# Patient Record
Sex: MALE | Race: WHITE | NOT HISPANIC OR LATINO | Employment: UNEMPLOYED | URBAN - METROPOLITAN AREA
[De-identification: names, ages, dates, MRNs, and addresses within clinical notes are randomized per-mention and may not be internally consistent; named-entity substitution may affect disease eponyms.]

---

## 2017-07-18 ENCOUNTER — HOSPITAL ENCOUNTER (EMERGENCY)
Facility: HOSPITAL | Age: 27
Discharge: HOME | End: 2017-07-18
Attending: EMERGENCY MEDICINE
Payer: COMMERCIAL

## 2017-07-18 VITALS
OXYGEN SATURATION: 95 % | SYSTOLIC BLOOD PRESSURE: 133 MMHG | RESPIRATION RATE: 18 BRPM | WEIGHT: 200 LBS | DIASTOLIC BLOOD PRESSURE: 73 MMHG | HEART RATE: 71 BPM
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
## 2017-07-18 DIAGNOSIS — T78.40XA ALLERGIC REACTION, INITIAL ENCOUNTER: Primary | ICD-10-CM

## 2017-07-18 PROCEDURE — 99283 EMERGENCY DEPT VISIT LOW MDM: CPT

## 2017-07-18 RX ORDER — DIPHENHYDRAMINE HCL 25 MG
25 CAPSULE ORAL EVERY 6 HOURS PRN
Qty: 20 CAPSULE | Refills: 0 | Status: SHIPPED | OUTPATIENT
Start: 2017-07-18 | End: 2018-03-29 | Stop reason: ALTCHOICE

## 2017-07-18 RX ORDER — DIPHENHYDRAMINE HCL 25 MG
25 TABLET ORAL EVERY 6 HOURS PRN
Qty: 10 TABLET | Refills: 0 | OUTPATIENT
Start: 2017-07-18

## 2017-07-18 RX ORDER — DIPHENHYDRAMINE HCL 25 MG
25 TABLET ORAL ONCE
Status: COMPLETED | OUTPATIENT
Start: 2017-07-18 | End: 2017-07-18

## 2017-07-18 RX ORDER — PREDNISONE 20 MG/1
40 TABLET ORAL ONCE
Status: COMPLETED | OUTPATIENT
Start: 2017-07-18 | End: 2017-07-18

## 2017-07-18 RX ADMIN — DIPHENHYDRAMINE HCL 25 MG: 25 TABLET ORAL at 19:43

## 2017-07-18 RX ADMIN — PREDNISONE 40 MG: 20 TABLET ORAL at 19:44

## 2018-03-28 NOTE — PROGRESS NOTES
Assessment/Plan:  Detailed and specific instructions given to pt-  called pharmacist and asked him to give pt instructions as well - to prevent pt from confusing kenalog w/ permetherin  Diagnoses and all orders for this visit:    Snoring  -     Ambulatory referral to Sleep Medicine; Future    Scabies infestation  -     permethrin (ELIMITE) 5 % cream; Apply topically once for 1 dose Apply from neck to toes - leave on for 14 hrs then wash off and repeat in 7 days; have close contacts use as well  Bedbug bite, initial encounter  -     triamcinolone (KENALOG) 0 025 % cream; Apply topically 2 (two) times a day          Subjective: New patient here to establish care     Patient complain of snoring   Patient also complain of a body rash unsure of how long he had it   Patient thinks he had the flu vac already but unsure of when           Patient ID: Bryant Hughes is a 24 y o  male  New pt  Here to establish care  No meds  PMH - childhood asthma and depression  Pt is limited in reading and writing   Lives w his girlfriend - who is limited intellect  Pt complaining of snoring and told he stops breathing at night by girlfriend  Also complaining of rash on arms/ legs - not itching x  months but new rash on left hand this week and neck  Pt lives w/ adopted parents - but biological mom's home has bed bugs - and he slept there one week ago  The following portions of the patient's history were reviewed and updated as appropriate: allergies, past surgical history and problem list     Review of Systems   Constitutional: Negative for activity change, appetite change, chills, fatigue and fever  HENT: Negative  Negative for congestion  Eyes: Negative  Respiratory: Negative  Snoring   Cardiovascular: Negative  Negative for chest pain  Gastrointestinal: Negative  Negative for abdominal pain, blood in stool and nausea  Endocrine: Negative  Negative for cold intolerance  Genitourinary: Negative  Musculoskeletal: Negative  Skin: Positive for rash  See hpi   Allergic/Immunologic: Negative  Neurological: Negative  Hematological: Negative  Psychiatric/Behavioral: Negative  All other systems reviewed and are negative  Objective:    Vitals:    03/29/18 1608   BP: 130/90   Pulse: 105   Resp: 18   Temp: 98 6 °F (37 °C)   SpO2: 97%   Weight: 114 kg (251 lb 12 8 oz)   Height: 5' 8 11" (1 73 m)     There were no vitals taken for this visit  Physical Exam   Constitutional: He is oriented to person, place, and time  He appears well-developed and well-nourished  No distress  HENT:   Head: Normocephalic  Right Ear: Tympanic membrane and external ear normal  No decreased hearing is noted  Left Ear: Tympanic membrane and external ear normal  No decreased hearing is noted  Mouth/Throat: Oropharynx is clear and moist    Eyes: Conjunctivae are normal  Pupils are equal, round, and reactive to light  Neck: Normal range of motion  Neck supple  No thyromegaly present  Cardiovascular: Normal rate, regular rhythm, normal heart sounds and intact distal pulses  No murmur heard  Pulmonary/Chest: Effort normal and breath sounds normal  No respiratory distress  Abdominal: Soft  Bowel sounds are normal  He exhibits no distension  There is no tenderness  There is no rebound  Musculoskeletal: Normal range of motion  Lymphadenopathy:     He has no cervical adenopathy  Neurological: He is alert and oriented to person, place, and time  He has normal reflexes  No cranial nerve deficit  Coordination normal    Skin: Skin is warm and dry  Rash noted  Rash is maculopapular (scattered bilat  forearms  and scarcely on lower legs; linear red pruritic area of papules left hand and in between left fingers/ on neck)  Rash is not vesicular  There is erythema  Psychiatric: He has a normal mood and affect   His behavior is normal  Judgment and thought content normal

## 2018-03-29 ENCOUNTER — OFFICE VISIT (OUTPATIENT)
Dept: FAMILY MEDICINE CLINIC | Facility: CLINIC | Age: 28
End: 2018-03-29
Payer: COMMERCIAL

## 2018-03-29 VITALS
OXYGEN SATURATION: 97 % | HEART RATE: 105 BPM | RESPIRATION RATE: 18 BRPM | BODY MASS INDEX: 38.16 KG/M2 | SYSTOLIC BLOOD PRESSURE: 130 MMHG | DIASTOLIC BLOOD PRESSURE: 90 MMHG | HEIGHT: 68 IN | WEIGHT: 251.8 LBS | TEMPERATURE: 98.6 F
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
## 2018-03-29 DIAGNOSIS — B86 SCABIES INFESTATION: ICD-10-CM

## 2018-03-29 DIAGNOSIS — R06.83 SNORING: Primary | ICD-10-CM

## 2018-03-29 PROBLEM — F81.9 INTELLECTUAL DELAY: Status: ACTIVE | Noted: 2018-03-29

## 2018-03-29 PROCEDURE — 99203 OFFICE O/P NEW LOW 30 MIN: CPT | Performed by: NURSE PRACTITIONER

## 2018-03-29 PROCEDURE — 3008F BODY MASS INDEX DOCD: CPT | Performed by: NURSE PRACTITIONER

## 2018-03-29 RX ORDER — TRIAMCINOLONE ACETONIDE 0.25 MG/G
CREAM TOPICAL 2 TIMES DAILY
Qty: 30 G | Refills: 0 | Status: SHIPPED | OUTPATIENT
Start: 2018-03-29 | End: 2018-08-21

## 2018-03-29 RX ORDER — PERMETHRIN 50 MG/G
CREAM TOPICAL ONCE
Qty: 60 G | Refills: 2 | Status: SHIPPED | OUTPATIENT
Start: 2018-03-29 | End: 2018-03-29

## 2018-03-29 NOTE — PATIENT INSTRUCTIONS
Scabies   AMBULATORY CARE:   Scabies  is a skin condition that is caused by scabies mites  Scabies mites are tiny bugs that burrow, lay eggs, and live underneath the skin  Scabies is spread through close contact with a person who has scabies  This includes having sex, sleeping in the same bed, or sharing towels or clothing  Scabies can spread quickly and must be treated as soon as it is found  Common signs and symptoms: You may not know you have scabies until a few weeks after mites are under your skin  Scabies mites are too small to be seen on your body  You may have any of the following:  · Red, raised bumps on your skin    · Bad itching that is usually worse at night    · Burrow marks (short wavy lines) between your fingers, or on your ankles, elbows, groin, armpits, or breasts  Seek care immediately if:   · You develop a fever and red, swollen, painful areas on your skin  Contact your healthcare provider if:   · The bites become crusty or filled with pus  · You have worsening itching after scabies treatment  · You have new bite or burrow marks after treatment  · You have questions or concerns about your condition or care  Treatment:  Your healthcare provider may want to treat scabies even if signs of mites are not found  Several kinds of medicine may be used to treat scabies  The medicine may be a cream or pill  Always follow the directions for the scabies medicine you are told to use  · Your healthcare provider may tell you to rub a thin layer of the medicine onto your entire body from the neck down  · Leave the cream on for the amount of time that is required for the medicine you are using  This may be between 8 to 14 hours  · Take a bath or shower to wash all medicine from your skin after the scabies treatment is done  · Put on clean clothes after you have rinsed the medicine off  You may need another scabies treatment in about 7 to 10 days if you continue to have symptoms    Help relieve itching: Your skin may continue to itch for 2 or 3 weeks, even after the scabies mites are gone  Over-the-counter antihistamines or cortisone cream may help relieve itching  Trim your fingernails so you do not spread any mites that are still alive after treatment  Do not scratch your skin  Scratches may cause a skin infection  A cool bath may also help relieve the itching  Prevent the spread of scabies:   · Have all family members use scabies medicine  Tell all sex partners and anyone who has shared your clothing or bed for the past month about the scabies  Tell them to ask their healthcare provider for scabies medicine even if they have no itching, rash, or burrow marks  · Wash all items that you have used since 3 days before you learned about your scabies  Use hot water to wash all clothing, bedding, and towels  Dry them for at least 20 minutes on the hot cycle of a dryer  Take items to be dry cleaned that cannot be washed in a washing machine  Place any clothing or bedding that cannot be washed or dry cleaned in a closed plastic bag for 1 week  · Do not have close body contact with anyone until the scabies mites are gone  Talk to your healthcare provider about how long you need to wait  Also ask about public places to avoid, such as the gym  Return to school or work: You may return to school or work 24 hours after using scabies medicine  Follow up with your healthcare provider as directed:  Write down your questions so you remember to ask them during your visits  © 2017 [address] Information is for End User's use only and may not be sold, redistributed or otherwise used for commercial purposes  All illustrations and images included in CareNotes® are the copyrighted property of [employer]  or [employer]  The above information is an  only  It is not intended as medical advice for individual conditions or treatments   Talk to your doctor, nurse or pharmacist before following any medical regimen to see if it is safe and effective for you

## 2018-08-21 ENCOUNTER — APPOINTMENT (EMERGENCY)
Dept: RADIOLOGY | Facility: HOSPITAL | Age: 28
End: 2018-08-21
Payer: COMMERCIAL

## 2018-08-21 ENCOUNTER — HOSPITAL ENCOUNTER (EMERGENCY)
Facility: HOSPITAL | Age: 28
Discharge: HOME | End: 2018-08-21
Attending: EMERGENCY MEDICINE
Payer: COMMERCIAL

## 2018-08-21 VITALS
OXYGEN SATURATION: 99 % | HEART RATE: 80 BPM | TEMPERATURE: 97.7 F | DIASTOLIC BLOOD PRESSURE: 65 MMHG | HEIGHT: 68 IN | SYSTOLIC BLOOD PRESSURE: 142 MMHG | BODY MASS INDEX: 40.01 KG/M2 | RESPIRATION RATE: 17 BRPM | WEIGHT: 264 LBS

## 2018-08-21 DIAGNOSIS — R10.9 ABDOMINAL PAIN: Primary | ICD-10-CM

## 2018-08-21 LAB
ALBUMIN SERPL BCP-MCNC: 3.7 G/DL (ref 3.5–5)
ALP SERPL-CCNC: 71 U/L (ref 46–116)
ALT SERPL W P-5'-P-CCNC: 117 U/L (ref 12–78)
ANION GAP SERPL CALCULATED.3IONS-SCNC: 8 MMOL/L (ref 4–13)
AST SERPL W P-5'-P-CCNC: 45 U/L (ref 5–45)
BACTERIA UR QL AUTO: ABNORMAL /HPF
BASOPHILS # BLD AUTO: 0.08 THOUSANDS/ΜL (ref 0–0.1)
BASOPHILS NFR BLD AUTO: 1 % (ref 0–1)
BILIRUB SERPL-MCNC: 0.34 MG/DL (ref 0.2–1)
BILIRUB UR QL STRIP: NEGATIVE
BUN SERPL-MCNC: 12 MG/DL (ref 5–25)
CALCIUM SERPL-MCNC: 8.7 MG/DL (ref 8.3–10.1)
CHLORIDE SERPL-SCNC: 105 MMOL/L (ref 100–108)
CLARITY UR: CLEAR
CO2 SERPL-SCNC: 27 MMOL/L (ref 21–32)
COLOR UR: YELLOW
COLOR, POC: NORMAL
CREAT SERPL-MCNC: 0.83 MG/DL (ref 0.6–1.3)
EOSINOPHIL # BLD AUTO: 0.37 THOUSAND/ΜL (ref 0–0.61)
EOSINOPHIL NFR BLD AUTO: 3 % (ref 0–6)
ERYTHROCYTE [DISTWIDTH] IN BLOOD BY AUTOMATED COUNT: 13.1 % (ref 11.6–15.1)
GFR SERPL CREATININE-BSD FRML MDRD: 122 ML/MIN/1.73SQ M
GLUCOSE SERPL-MCNC: 104 MG/DL (ref 65–140)
GLUCOSE UR STRIP-MCNC: NEGATIVE MG/DL
HCT VFR BLD AUTO: 46.4 % (ref 36.5–49.3)
HGB BLD-MCNC: 15.3 G/DL (ref 12–17)
HGB UR QL STRIP.AUTO: ABNORMAL
HYALINE CASTS #/AREA URNS LPF: ABNORMAL /LPF
IMM GRANULOCYTES # BLD AUTO: 0.05 THOUSAND/UL (ref 0–0.2)
IMM GRANULOCYTES NFR BLD AUTO: 0 % (ref 0–2)
KETONES UR STRIP-MCNC: NEGATIVE MG/DL
LEUKOCYTE ESTERASE UR QL STRIP: ABNORMAL
LIPASE SERPL-CCNC: 111 U/L (ref 73–393)
LYMPHOCYTES # BLD AUTO: 3.17 THOUSANDS/ΜL (ref 0.6–4.47)
LYMPHOCYTES NFR BLD AUTO: 29 % (ref 14–44)
MCH RBC QN AUTO: 28.8 PG (ref 26.8–34.3)
MCHC RBC AUTO-ENTMCNC: 33 G/DL (ref 31.4–37.4)
MCV RBC AUTO: 87 FL (ref 82–98)
MONOCYTES # BLD AUTO: 0.74 THOUSAND/ΜL (ref 0.17–1.22)
MONOCYTES NFR BLD AUTO: 7 % (ref 4–12)
NEUTROPHILS # BLD AUTO: 6.72 THOUSANDS/ΜL (ref 1.85–7.62)
NEUTS SEG NFR BLD AUTO: 60 % (ref 43–75)
NITRITE UR QL STRIP: NEGATIVE
NON-SQ EPI CELLS URNS QL MICRO: ABNORMAL /HPF
NRBC BLD AUTO-RTO: 0 /100 WBCS
PH UR STRIP.AUTO: 7 [PH] (ref 4.5–8)
PLATELET # BLD AUTO: 249 THOUSANDS/UL (ref 149–390)
PMV BLD AUTO: 10.1 FL (ref 8.9–12.7)
POTASSIUM SERPL-SCNC: 3.6 MMOL/L (ref 3.5–5.3)
PROT SERPL-MCNC: 7.1 G/DL (ref 6.4–8.2)
PROT UR STRIP-MCNC: NEGATIVE MG/DL
RBC # BLD AUTO: 5.31 MILLION/UL (ref 3.88–5.62)
RBC #/AREA URNS AUTO: ABNORMAL /HPF
SODIUM SERPL-SCNC: 140 MMOL/L (ref 136–145)
SP GR UR STRIP.AUTO: >=1.03 (ref 1–1.03)
UROBILINOGEN UR QL STRIP.AUTO: 0.2 E.U./DL
WBC # BLD AUTO: 11.13 THOUSAND/UL (ref 4.31–10.16)
WBC #/AREA URNS AUTO: ABNORMAL /HPF

## 2018-08-21 PROCEDURE — 96372 THER/PROPH/DIAG INJ SC/IM: CPT

## 2018-08-21 PROCEDURE — 36415 COLL VENOUS BLD VENIPUNCTURE: CPT

## 2018-08-21 PROCEDURE — 83690 ASSAY OF LIPASE: CPT | Performed by: EMERGENCY MEDICINE

## 2018-08-21 PROCEDURE — 81001 URINALYSIS AUTO W/SCOPE: CPT

## 2018-08-21 PROCEDURE — 85025 COMPLETE CBC W/AUTO DIFF WBC: CPT | Performed by: EMERGENCY MEDICINE

## 2018-08-21 PROCEDURE — 74177 CT ABD & PELVIS W/CONTRAST: CPT

## 2018-08-21 PROCEDURE — 80053 COMPREHEN METABOLIC PANEL: CPT | Performed by: EMERGENCY MEDICINE

## 2018-08-21 PROCEDURE — 99284 EMERGENCY DEPT VISIT MOD MDM: CPT

## 2018-08-21 PROCEDURE — 87086 URINE CULTURE/COLONY COUNT: CPT

## 2018-08-21 RX ORDER — KETOROLAC TROMETHAMINE 30 MG/ML
15 INJECTION, SOLUTION INTRAMUSCULAR; INTRAVENOUS ONCE
Status: COMPLETED | OUTPATIENT
Start: 2018-08-21 | End: 2018-08-21

## 2018-08-21 RX ADMIN — IOHEXOL 100 ML: 350 INJECTION, SOLUTION INTRAVENOUS at 13:51

## 2018-08-21 RX ADMIN — KETOROLAC TROMETHAMINE 15 MG: 30 INJECTION, SOLUTION INTRAMUSCULAR at 14:52

## 2018-08-21 NOTE — DISCHARGE INSTRUCTIONS

## 2018-08-21 NOTE — ED PROVIDER NOTES
History  Chief Complaint   Patient presents with    Abdominal Pain     Pt c/o R lower quadrant abd pain since last monday  Pt denies n/v/d at this time  24year old male with history of asthma, current smoker, presents to ED for RLQ abdominal pain  Reports that pain started 8 days ago and has been gradually worsening  Pain is sharp in quality, 7-9/10 severity, constant, worse with ambulation and movement, non-radiating  He denies changes in appetite, nausea, vomiting, diarrhea, fever, chills, constipation, urinary symptoms, flank pain, back pain, testicular pain, chest pain, shortness of breath, leg pain/swelling, or any additional complaints  No history of abdominal surgeries  None       Past Medical History:   Diagnosis Date    Asthma     Depression        History reviewed  No pertinent surgical history  Family History   Problem Relation Age of Onset    Mental illness Mother     Mental illness Sister      I have reviewed and agree with the history as documented  Social History   Substance Use Topics    Smoking status: Current Every Day Smoker     Packs/day: 0 50     Types: Cigarettes    Smokeless tobacco: Never Used    Alcohol use No        Review of Systems   Constitutional: Negative  Negative for chills and fever  HENT: Negative  Negative for congestion and rhinorrhea  Eyes: Negative  Respiratory: Negative  Negative for cough and shortness of breath  Cardiovascular: Negative  Negative for chest pain and leg swelling  Gastrointestinal: Positive for abdominal pain (RLQ)  Negative for abdominal distention, diarrhea, nausea and vomiting  Genitourinary: Negative for dysuria, flank pain, frequency, testicular pain and urgency  Musculoskeletal: Negative  Negative for back pain and neck pain  Skin: Negative  Negative for rash  Neurological: Negative  Negative for light-headedness and headaches  Hematological: Negative      All other systems reviewed and are negative  Physical Exam  ED Triage Vitals   Temperature Pulse Respirations Blood Pressure SpO2   08/21/18 1243 08/21/18 1243 08/21/18 1356 08/21/18 1245 08/21/18 1243   97 7 °F (36 5 °C) 86 17 149/67 98 %      Temp Source Heart Rate Source Patient Position - Orthostatic VS BP Location FiO2 (%)   08/21/18 1243 08/21/18 1243 08/21/18 1245 08/21/18 1245 --   Oral Monitor Lying Right arm       Pain Score       08/21/18 1243       8           Orthostatic Vital Signs  Vitals:    08/21/18 1243 08/21/18 1245 08/21/18 1356   BP:  149/67 142/65   Pulse: 86  80   Patient Position - Orthostatic VS:  Lying Lying       Physical Exam   Constitutional: He is oriented to person, place, and time  He appears well-developed and well-nourished  No distress  Comfortable appearing male, speaking calmly on phone during exam   HENT:   Head: Normocephalic and atraumatic  Mouth/Throat: Oropharynx is clear and moist    Eyes: EOM are normal    Neck: Normal range of motion  Neck supple  Cardiovascular: Normal rate, regular rhythm, normal heart sounds and intact distal pulses  Exam reveals no gallop and no friction rub  No murmur heard  Pulmonary/Chest: Effort normal and breath sounds normal  No respiratory distress  He has no wheezes  He has no rales  Abdominal: Soft  Bowel sounds are normal  He exhibits no distension and no mass  There is tenderness (RLQ with mild rebound tenderness and positive Rovsing's sign)  There is no rebound and no guarding  No hernia  Musculoskeletal: Normal range of motion  He exhibits no edema or tenderness  Neurological: He is alert and oriented to person, place, and time  Clear fluent speech   Skin: Skin is warm and dry  Capillary refill takes less than 2 seconds  Psychiatric: He has a normal mood and affect  Nursing note and vitals reviewed        ED Medications  Medications   iohexol (OMNIPAQUE) 350 MG/ML injection (MULTI-DOSE) 100 mL (100 mL Intravenous Given 8/21/18 1351)   ketorolac (TORADOL) injection 15 mg (15 mg Intramuscular Given 8/21/18 1275)       Diagnostic Studies  Results Reviewed     Procedure Component Value Units Date/Time    Urine Microscopic [[id number]]  (Abnormal) Collected:  08/21/18 1358    Lab Status:  Final result Specimen:  Urine from Urine, Clean Catch Updated:  08/21/18 1356     RBC, UA None Seen /hpf      WBC, UA 10-20 (A) /hpf      Epithelial Cells None Seen /hpf      Bacteria, UA None Seen /hpf      Hyaline Casts, UA 3-5 (A) /lpf     Urine culture [[id number]] Collected:  08/21/18 1358    Lab Status:   In process Specimen:  Urine from Urine, Clean Catch Updated:  08/21/18 1356    POCT urinalysis dipstick [[id number]]  (Normal) Resulted:  08/21/18 1346    Lab Status:  Final result Updated:  08/21/18 1346     Color, UA completed    ED Urine Macroscopic [[id number]]  (Abnormal) Collected:  08/21/18 1358    Lab Status:  Final result Specimen:  Urine Updated:  08/21/18 1345     Color, UA Yellow     Clarity, UA Clear     pH, UA 7 0     Leukocytes, UA Trace (A)     Nitrite, UA Negative     Protein, UA Negative mg/dl      Glucose, UA Negative mg/dl      Ketones, UA Negative mg/dl      Urobilinogen, UA 0 2 E U /dl      Bilirubin, UA Negative     Blood, UA Trace (A)     Specific Gravity, UA >=1 030    Narrative:       CLINITEK RESULT    Comprehensive metabolic panel [[id number]]  (Abnormal) Collected:  08/21/18 1252    Lab Status:  Final result Specimen:  Blood from Arm, Left Updated:  08/21/18 1319     Sodium 140 mmol/L      Potassium 3 6 mmol/L      Chloride 105 mmol/L      CO2 27 mmol/L      Anion Gap 8 mmol/L      BUN 12 mg/dL      Creatinine 0 83 mg/dL      Glucose 104 mg/dL      Calcium 8 7 mg/dL      AST 45 U/L       (H) U/L      Alkaline Phosphatase 71 U/L      Total Protein 7 1 g/dL      Albumin 3 7 g/dL      Total Bilirubin 0 34 mg/dL      eGFR 122 ml/min/1 73sq m     Narrative:         National Kidney Disease Education Program recommendations are as follows:  GFR calculation is accurate only with a steady state creatinine  Chronic Kidney disease less than 60 ml/min/1 73 sq  meters  Kidney failure less than 15 ml/min/1 73 sq  meters  Lipase [[id number]]  (Normal) Collected:  08/21/18 1252    Lab Status:  Final result Specimen:  Blood from Arm, Left Updated:  08/21/18 1319     Lipase 111 u/L     CBC and differential [[id number]]  (Abnormal) Collected:  08/21/18 1252    Lab Status:  Final result Specimen:  Blood from Arm, Left Updated:  08/21/18 1301     WBC 11 13 (H) Thousand/uL      RBC 5 31 Million/uL      Hemoglobin 15 3 g/dL      Hematocrit 46 4 %      MCV 87 fL      MCH 28 8 pg      MCHC 33 0 g/dL      RDW 13 1 %      MPV 10 1 fL      Platelets 687 Thousands/uL      nRBC 0 /100 WBCs      Neutrophils Relative 60 %      Immat GRANS % 0 %      Lymphocytes Relative 29 %      Monocytes Relative 7 %      Eosinophils Relative 3 %      Basophils Relative 1 %      Neutrophils Absolute 6 72 Thousands/µL      Immature Grans Absolute 0 05 Thousand/uL      Lymphocytes Absolute 3 17 Thousands/µL      Monocytes Absolute 0 74 Thousand/µL      Eosinophils Absolute 0 37 Thousand/µL      Basophils Absolute 0 08 Thousands/µL                  CT abdomen pelvis with contrast   Final Result by Coy Ball DO (08/21 1431)   1  Limited examination due to lack of oral contrast material as well as the nonfasting state   2  No CT evidence of acute appendicitis  3   Hepatomegaly with fatty infiltration  Workstation performed: [id number]               Procedures  Procedures      Phone Consults  ED Phone Contact    ED Course     Abdominal US is normal: No evidence of cholecystitis, cholelithiasis, or hydronephrosis  MDM  Number of Diagnoses or Management Options  Abdominal pain:   Diagnosis management comments: 24year old male presenting with 8 days of RLQ pain without any additional symptoms   Within the differential considered hernia vs musculoskeletal vs appendicitis vs renal stone vs gallbladder pathology  Obtained bedside US which was negative for cholecystitis, cholelithiasis, or hydronephrosis  Abdominal CT reveals mild hepatomegaly and ALT is mildly elevated but labs and imaging are otherwise unremarkable  Patient feels improved with IM Toradol  Strict ED return precautions provided should symptoms worsen and patient can otherwise follow up with PCP  Patient expresses an understanding and agreement with the plan and remains in good condition for discharge  CritCare Time    Disposition  Final diagnoses:   Abdominal pain     Time reflects when diagnosis was documented in both MDM as applicable and the Disposition within this note     Time User Action Codes Description Comment    8/21/2018  2:44 PM Berta Garces Add [R10 9] Abdominal pain       ED Disposition     ED Disposition Condition Comment    Discharge  Guerline Navarro discharge to home/self care  Condition at discharge: Good        Follow-up Information     Follow up With Specialties Details Why Contact Info Additional [address], [address] Nurse Practitioner Call in 1 day To make an appointment [address]  [address]da [address]en [phone]       [address] [place] [address] Emergency Department Emergency Medicine Go to If symptoms worsen [address] [facility] ED, [address] [place] [address], [id number]          There are no discharge medications for this patient  No discharge procedures on file  ED Provider  Attending physically available and evaluated Guerline Navarro I managed the patient along with the ED Attending      Electronically Signed by         Manny Espinoza MD  08/21/18 5529

## 2018-08-21 NOTE — ED ATTENDING ATTESTATION
Christina Beatty DO, saw and evaluated the patient  I have discussed the patient with the resident/non-physician practitioner and agree with the resident's/non-physician practitioner's findings, Plan of Care, and MDM as documented in the resident's/non-physician practitioner's note, except where noted  All available labs and Radiology studies were reviewed  At this point I agree with the current assessment done in the Emergency Department  I have conducted an independent evaluation of this patient a history and physical is as follows:    24 yom with 8 days of RLQ ab pain  Worse with walking  No radiation to groin  Past Medical History:   Diagnosis Date    Asthma     Depression      History reviewed  No pertinent surgical history  /67 (BP Location: Right arm)   Pulse 86   Temp 97 7 °F (36 5 °C) (Oral)   Ht 5' 8" (1 727 m)   Wt 120 kg (264 lb)   SpO2 98%   BMI 40 14 kg/m²   A&Ox4  CTA  RRR  Ab soft, TTP in RLQ, no rebound or guarding    abd labs, CT AP were unremarkable    Patient felt better and wants to go home    Dx  abdominal pain      Critical Care Time  CritCare Time    Procedures

## 2018-08-22 LAB — BACTERIA UR CULT: ABNORMAL

## 2019-04-16 ENCOUNTER — HOSPITAL ENCOUNTER (OUTPATIENT)
Dept: RADIOLOGY | Facility: HOSPITAL | Age: 29
Discharge: HOME | End: 2019-04-16
Payer: COMMERCIAL

## 2019-04-16 ENCOUNTER — TRANSCRIBE ORDERS (OUTPATIENT)
Dept: RADIOLOGY | Facility: HOSPITAL | Age: 29
End: 2019-04-16

## 2019-04-16 DIAGNOSIS — S93.431A SPRAIN OF TIBIOFIBULAR LIGAMENT OF RIGHT ANKLE, INITIAL ENCOUNTER: ICD-10-CM

## 2019-04-16 DIAGNOSIS — M25.462 EFFUSION OF LEFT KNEE JOINT: Primary | ICD-10-CM

## 2019-04-16 DIAGNOSIS — M25.462 EFFUSION OF LEFT KNEE JOINT: ICD-10-CM

## 2019-04-16 PROCEDURE — 73562 X-RAY EXAM OF KNEE 3: CPT

## 2019-04-16 PROCEDURE — 73610 X-RAY EXAM OF ANKLE: CPT

## 2019-04-21 ENCOUNTER — HOSPITAL ENCOUNTER (OUTPATIENT)
Facility: HOSPITAL | Age: 29
Setting detail: OBSERVATION
Discharge: HOME | End: 2019-04-21
Attending: EMERGENCY MEDICINE | Admitting: FAMILY MEDICINE
Payer: COMMERCIAL

## 2019-04-21 ENCOUNTER — APPOINTMENT (EMERGENCY)
Dept: RADIOLOGY | Facility: HOSPITAL | Age: 29
End: 2019-04-21
Payer: COMMERCIAL

## 2019-04-21 VITALS
TEMPERATURE: 98.2 F | SYSTOLIC BLOOD PRESSURE: 151 MMHG | BODY MASS INDEX: 40.09 KG/M2 | HEIGHT: 68 IN | HEART RATE: 66 BPM | WEIGHT: 264.55 LBS | DIASTOLIC BLOOD PRESSURE: 65 MMHG | OXYGEN SATURATION: 97 % | RESPIRATION RATE: 18 BRPM

## 2019-04-21 DIAGNOSIS — R56.9 SEIZURE-LIKE ACTIVITY (HCC): Primary | ICD-10-CM

## 2019-04-21 DIAGNOSIS — G40.909 EPILEPSY (HCC): ICD-10-CM

## 2019-04-21 DIAGNOSIS — Z86.69 HX OF SEIZURE DISORDER: ICD-10-CM

## 2019-04-21 LAB
ALBUMIN SERPL BCP-MCNC: 3.7 G/DL (ref 3.5–5)
ALP SERPL-CCNC: 74 U/L (ref 46–116)
ALT SERPL W P-5'-P-CCNC: 115 U/L (ref 12–78)
AMPHETAMINES SERPL QL SCN: NEGATIVE
ANION GAP SERPL CALCULATED.3IONS-SCNC: 6 MMOL/L (ref 4–13)
AST SERPL W P-5'-P-CCNC: 54 U/L (ref 5–45)
BARBITURATES UR QL: NEGATIVE
BASOPHILS # BLD AUTO: 0.08 THOUSANDS/ΜL (ref 0–0.1)
BASOPHILS NFR BLD AUTO: 1 % (ref 0–1)
BENZODIAZ UR QL: NEGATIVE
BILIRUB SERPL-MCNC: 0.44 MG/DL (ref 0.2–1)
BILIRUB UR QL STRIP: NEGATIVE
BUN SERPL-MCNC: 11 MG/DL (ref 5–25)
CALCIUM SERPL-MCNC: 8.4 MG/DL (ref 8.3–10.1)
CHLORIDE SERPL-SCNC: 106 MMOL/L (ref 100–108)
CLARITY UR: CLEAR
CO2 SERPL-SCNC: 28 MMOL/L (ref 21–32)
COCAINE UR QL: NEGATIVE
COLOR UR: YELLOW
COLOR, POC: YELLOW
CREAT SERPL-MCNC: 0.76 MG/DL (ref 0.6–1.3)
EOSINOPHIL # BLD AUTO: 0.47 THOUSAND/ΜL (ref 0–0.61)
EOSINOPHIL NFR BLD AUTO: 4 % (ref 0–6)
ERYTHROCYTE [DISTWIDTH] IN BLOOD BY AUTOMATED COUNT: 13.2 % (ref 11.6–15.1)
GFR SERPL CREATININE-BSD FRML MDRD: 126 ML/MIN/1.73SQ M
GLUCOSE SERPL-MCNC: 93 MG/DL (ref 65–140)
GLUCOSE UR STRIP-MCNC: NEGATIVE MG/DL
HCT VFR BLD AUTO: 45.9 % (ref 36.5–49.3)
HGB BLD-MCNC: 15.7 G/DL (ref 12–17)
HGB UR QL STRIP.AUTO: NEGATIVE
IMM GRANULOCYTES # BLD AUTO: 0.06 THOUSAND/UL (ref 0–0.2)
IMM GRANULOCYTES NFR BLD AUTO: 1 % (ref 0–2)
KETONES UR STRIP-MCNC: NEGATIVE MG/DL
LEUKOCYTE ESTERASE UR QL STRIP: NEGATIVE
LYMPHOCYTES # BLD AUTO: 3.6 THOUSANDS/ΜL (ref 0.6–4.47)
LYMPHOCYTES NFR BLD AUTO: 31 % (ref 14–44)
MAGNESIUM SERPL-MCNC: 2.1 MG/DL (ref 1.6–2.6)
MCH RBC QN AUTO: 29.5 PG (ref 26.8–34.3)
MCHC RBC AUTO-ENTMCNC: 34.2 G/DL (ref 31.4–37.4)
MCV RBC AUTO: 86 FL (ref 82–98)
METHADONE UR QL: NEGATIVE
MONOCYTES # BLD AUTO: 0.78 THOUSAND/ΜL (ref 0.17–1.22)
MONOCYTES NFR BLD AUTO: 7 % (ref 4–12)
NEUTROPHILS # BLD AUTO: 6.78 THOUSANDS/ΜL (ref 1.85–7.62)
NEUTS SEG NFR BLD AUTO: 56 % (ref 43–75)
NITRITE UR QL STRIP: NEGATIVE
NRBC BLD AUTO-RTO: 0 /100 WBCS
OPIATES UR QL SCN: NEGATIVE
PCP UR QL: NEGATIVE
PH UR STRIP.AUTO: 7 [PH] (ref 4.5–8)
PHOSPHATE SERPL-MCNC: 3.9 MG/DL (ref 2.7–4.5)
PLATELET # BLD AUTO: 265 THOUSANDS/UL (ref 149–390)
PMV BLD AUTO: 11.8 FL (ref 8.9–12.7)
POTASSIUM SERPL-SCNC: 3.6 MMOL/L (ref 3.5–5.3)
PROT SERPL-MCNC: 7.2 G/DL (ref 6.4–8.2)
PROT UR STRIP-MCNC: NEGATIVE MG/DL
RBC # BLD AUTO: 5.33 MILLION/UL (ref 3.88–5.62)
SODIUM SERPL-SCNC: 140 MMOL/L (ref 136–145)
SP GR UR STRIP.AUTO: 1.02 (ref 1–1.03)
THC UR QL: POSITIVE
TROPONIN I SERPL-MCNC: <0.02 NG/ML
UROBILINOGEN UR QL STRIP.AUTO: 0.2 E.U./DL
WBC # BLD AUTO: 11.77 THOUSAND/UL (ref 4.31–10.16)

## 2019-04-21 PROCEDURE — 81003 URINALYSIS AUTO W/O SCOPE: CPT

## 2019-04-21 PROCEDURE — 80307 DRUG TEST PRSMV CHEM ANLYZR: CPT | Performed by: EMERGENCY MEDICINE

## 2019-04-21 PROCEDURE — 36415 COLL VENOUS BLD VENIPUNCTURE: CPT | Performed by: EMERGENCY MEDICINE

## 2019-04-21 PROCEDURE — 99204 OFFICE O/P NEW MOD 45 MIN: CPT | Performed by: PSYCHIATRY & NEUROLOGY

## 2019-04-21 PROCEDURE — 80053 COMPREHEN METABOLIC PANEL: CPT | Performed by: EMERGENCY MEDICINE

## 2019-04-21 PROCEDURE — 84100 ASSAY OF PHOSPHORUS: CPT | Performed by: EMERGENCY MEDICINE

## 2019-04-21 PROCEDURE — 93005 ELECTROCARDIOGRAM TRACING: CPT

## 2019-04-21 PROCEDURE — 83735 ASSAY OF MAGNESIUM: CPT | Performed by: EMERGENCY MEDICINE

## 2019-04-21 PROCEDURE — 99285 EMERGENCY DEPT VISIT HI MDM: CPT

## 2019-04-21 PROCEDURE — 85025 COMPLETE CBC W/AUTO DIFF WBC: CPT | Performed by: EMERGENCY MEDICINE

## 2019-04-21 PROCEDURE — 99284 EMERGENCY DEPT VISIT MOD MDM: CPT | Performed by: EMERGENCY MEDICINE

## 2019-04-21 PROCEDURE — 84484 ASSAY OF TROPONIN QUANT: CPT | Performed by: FAMILY MEDICINE

## 2019-04-21 PROCEDURE — 70450 CT HEAD/BRAIN W/O DYE: CPT

## 2019-04-22 LAB
ATRIAL RATE: 72 BPM
P AXIS: 22 DEGREES
PR INTERVAL: 142 MS
QRS AXIS: 3 DEGREES
QRSD INTERVAL: 98 MS
QT INTERVAL: 372 MS
QTC INTERVAL: 407 MS
T WAVE AXIS: -8 DEGREES
VENTRICULAR RATE: 72 BPM

## 2019-04-22 PROCEDURE — 93010 ELECTROCARDIOGRAM REPORT: CPT | Performed by: INTERNAL MEDICINE

## 2019-05-23 ENCOUNTER — TRANSCRIBE ORDERS (OUTPATIENT)
Dept: ADMINISTRATIVE | Facility: HOSPITAL | Age: 29
End: 2019-05-23

## 2019-05-23 DIAGNOSIS — R06.81 APNEA: Primary | ICD-10-CM

## 2019-05-31 ENCOUNTER — APPOINTMENT (EMERGENCY)
Dept: RADIOLOGY | Facility: HOSPITAL | Age: 29
End: 2019-05-31
Payer: COMMERCIAL

## 2019-05-31 ENCOUNTER — HOSPITAL ENCOUNTER (EMERGENCY)
Facility: HOSPITAL | Age: 29
Discharge: HOME | End: 2019-05-31
Attending: EMERGENCY MEDICINE | Admitting: EMERGENCY MEDICINE
Payer: COMMERCIAL

## 2019-05-31 VITALS
WEIGHT: 264.55 LBS | TEMPERATURE: 98.7 F | DIASTOLIC BLOOD PRESSURE: 100 MMHG | BODY MASS INDEX: 40.22 KG/M2 | RESPIRATION RATE: 20 BRPM | OXYGEN SATURATION: 95 % | SYSTOLIC BLOOD PRESSURE: 185 MMHG | HEART RATE: 116 BPM

## 2019-05-31 DIAGNOSIS — L03.116 CELLULITIS OF LEFT FOOT: Primary | ICD-10-CM

## 2019-05-31 PROCEDURE — 99283 EMERGENCY DEPT VISIT LOW MDM: CPT

## 2019-05-31 PROCEDURE — 73630 X-RAY EXAM OF FOOT: CPT

## 2019-05-31 PROCEDURE — 99284 EMERGENCY DEPT VISIT MOD MDM: CPT | Performed by: EMERGENCY MEDICINE

## 2019-05-31 RX ORDER — CEPHALEXIN 500 MG/1
500 CAPSULE ORAL EVERY 12 HOURS SCHEDULED
Qty: 14 CAPSULE | Refills: 0 | Status: SHIPPED | OUTPATIENT
Start: 2019-05-31 | End: 2019-06-07

## 2019-06-11 ENCOUNTER — HOSPITAL ENCOUNTER (EMERGENCY)
Facility: HOSPITAL | Age: 29
Discharge: HOME | End: 2019-06-11
Attending: EMERGENCY MEDICINE
Payer: COMMERCIAL

## 2019-06-11 ENCOUNTER — APPOINTMENT (EMERGENCY)
Dept: RADIOLOGY | Facility: HOSPITAL | Age: 29
End: 2019-06-11
Payer: COMMERCIAL

## 2019-06-11 VITALS
TEMPERATURE: 98.2 F | DIASTOLIC BLOOD PRESSURE: 82 MMHG | HEART RATE: 85 BPM | SYSTOLIC BLOOD PRESSURE: 139 MMHG | OXYGEN SATURATION: 97 % | RESPIRATION RATE: 16 BRPM

## 2019-06-11 DIAGNOSIS — M79.89 LEG SWELLING: Primary | ICD-10-CM

## 2019-06-11 LAB
ALBUMIN SERPL BCP-MCNC: 3.6 G/DL (ref 3.5–5)
ALP SERPL-CCNC: 80 U/L (ref 46–116)
ALT SERPL W P-5'-P-CCNC: 120 U/L (ref 12–78)
ANION GAP SERPL CALCULATED.3IONS-SCNC: 4 MMOL/L (ref 4–13)
AST SERPL W P-5'-P-CCNC: 77 U/L (ref 5–45)
ATRIAL RATE: 75 BPM
BASOPHILS # BLD AUTO: 0.09 THOUSANDS/ΜL (ref 0–0.1)
BASOPHILS NFR BLD AUTO: 1 % (ref 0–1)
BILIRUB SERPL-MCNC: 0.39 MG/DL (ref 0.2–1)
BUN SERPL-MCNC: 12 MG/DL (ref 5–25)
CALCIUM SERPL-MCNC: 8.6 MG/DL (ref 8.3–10.1)
CHLORIDE SERPL-SCNC: 105 MMOL/L (ref 100–108)
CO2 SERPL-SCNC: 29 MMOL/L (ref 21–32)
CREAT SERPL-MCNC: 0.77 MG/DL (ref 0.6–1.3)
EOSINOPHIL # BLD AUTO: 0.57 THOUSAND/ΜL (ref 0–0.61)
EOSINOPHIL NFR BLD AUTO: 4 % (ref 0–6)
ERYTHROCYTE [DISTWIDTH] IN BLOOD BY AUTOMATED COUNT: 13.3 % (ref 11.6–15.1)
GFR SERPL CREATININE-BSD FRML MDRD: 125 ML/MIN/1.73SQ M
GLUCOSE SERPL-MCNC: 81 MG/DL (ref 65–140)
HCT VFR BLD AUTO: 43.7 % (ref 36.5–49.3)
HGB BLD-MCNC: 14.8 G/DL (ref 12–17)
IMM GRANULOCYTES # BLD AUTO: 0.13 THOUSAND/UL (ref 0–0.2)
IMM GRANULOCYTES NFR BLD AUTO: 1 % (ref 0–2)
LYMPHOCYTES # BLD AUTO: 4.47 THOUSANDS/ΜL (ref 0.6–4.47)
LYMPHOCYTES NFR BLD AUTO: 32 % (ref 14–44)
MCH RBC QN AUTO: 30.4 PG (ref 26.8–34.3)
MCHC RBC AUTO-ENTMCNC: 33.9 G/DL (ref 31.4–37.4)
MCV RBC AUTO: 90 FL (ref 82–98)
MONOCYTES # BLD AUTO: 0.94 THOUSAND/ΜL (ref 0.17–1.22)
MONOCYTES NFR BLD AUTO: 7 % (ref 4–12)
NEUTROPHILS # BLD AUTO: 7.78 THOUSANDS/ΜL (ref 1.85–7.62)
NEUTS SEG NFR BLD AUTO: 55 % (ref 43–75)
NRBC BLD AUTO-RTO: 0 /100 WBCS
NT-PROBNP SERPL-MCNC: <5 PG/ML
P AXIS: 8 DEGREES
PLATELET # BLD AUTO: 236 THOUSANDS/UL (ref 149–390)
PMV BLD AUTO: 10.1 FL (ref 8.9–12.7)
POTASSIUM SERPL-SCNC: 3.8 MMOL/L (ref 3.5–5.3)
PR INTERVAL: 130 MS
PROT SERPL-MCNC: 6.9 G/DL (ref 6.4–8.2)
QRS AXIS: 12 DEGREES
QRSD INTERVAL: 100 MS
QT INTERVAL: 410 MS
QTC INTERVAL: 457 MS
RBC # BLD AUTO: 4.87 MILLION/UL (ref 3.88–5.62)
SODIUM SERPL-SCNC: 138 MMOL/L (ref 136–145)
T WAVE AXIS: 12 DEGREES
VENTRICULAR RATE: 75 BPM
WBC # BLD AUTO: 13.98 THOUSAND/UL (ref 4.31–10.16)

## 2019-06-11 PROCEDURE — 80053 COMPREHEN METABOLIC PANEL: CPT | Performed by: EMERGENCY MEDICINE

## 2019-06-11 PROCEDURE — 71046 X-RAY EXAM CHEST 2 VIEWS: CPT

## 2019-06-11 PROCEDURE — 36415 COLL VENOUS BLD VENIPUNCTURE: CPT | Performed by: EMERGENCY MEDICINE

## 2019-06-11 PROCEDURE — 83880 ASSAY OF NATRIURETIC PEPTIDE: CPT | Performed by: EMERGENCY MEDICINE

## 2019-06-11 PROCEDURE — 93005 ELECTROCARDIOGRAM TRACING: CPT

## 2019-06-11 PROCEDURE — 85025 COMPLETE CBC W/AUTO DIFF WBC: CPT | Performed by: EMERGENCY MEDICINE

## 2019-06-11 PROCEDURE — 99283 EMERGENCY DEPT VISIT LOW MDM: CPT | Performed by: EMERGENCY MEDICINE

## 2019-06-11 PROCEDURE — 99284 EMERGENCY DEPT VISIT MOD MDM: CPT

## 2019-06-11 PROCEDURE — 93010 ELECTROCARDIOGRAM REPORT: CPT | Performed by: INTERNAL MEDICINE

## 2019-06-14 ENCOUNTER — CONSULT (OUTPATIENT)
Dept: NEUROLOGY | Facility: CLINIC | Age: 29
End: 2019-06-14
Payer: COMMERCIAL

## 2019-06-14 VITALS
BODY MASS INDEX: 46.23 KG/M2 | HEIGHT: 68 IN | HEART RATE: 109 BPM | SYSTOLIC BLOOD PRESSURE: 149 MMHG | DIASTOLIC BLOOD PRESSURE: 80 MMHG | WEIGHT: 305 LBS

## 2019-06-14 DIAGNOSIS — G40.909 NONINTRACTABLE EPILEPSY WITHOUT STATUS EPILEPTICUS, UNSPECIFIED EPILEPSY TYPE (HCC): Primary | ICD-10-CM

## 2019-06-14 DIAGNOSIS — F81.9 INTELLECTUAL DELAY: ICD-10-CM

## 2019-06-14 PROCEDURE — 99215 OFFICE O/P EST HI 40 MIN: CPT | Performed by: PSYCHIATRY & NEUROLOGY

## 2019-06-14 RX ORDER — DIPHENHYDRAMINE HCL 25 MG
25 CAPSULE ORAL EVERY 6 HOURS PRN
COMMUNITY
Start: 2015-10-25 | End: 2019-12-04 | Stop reason: ALTCHOICE

## 2019-06-19 ENCOUNTER — HOSPITAL ENCOUNTER (EMERGENCY)
Facility: HOSPITAL | Age: 29
Discharge: HOME | End: 2019-06-19
Attending: EMERGENCY MEDICINE
Payer: COMMERCIAL

## 2019-06-19 VITALS
BODY MASS INDEX: 46.11 KG/M2 | WEIGHT: 304.24 LBS | RESPIRATION RATE: 18 BRPM | DIASTOLIC BLOOD PRESSURE: 96 MMHG | OXYGEN SATURATION: 93 % | SYSTOLIC BLOOD PRESSURE: 154 MMHG | HEIGHT: 68 IN | HEART RATE: 91 BPM

## 2019-06-19 DIAGNOSIS — R56.9 SEIZURE-LIKE ACTIVITY (HCC): Primary | ICD-10-CM

## 2019-06-19 LAB
ALBUMIN SERPL BCP-MCNC: 3.6 G/DL (ref 3.5–5)
ALP SERPL-CCNC: 74 U/L (ref 46–116)
ALT SERPL W P-5'-P-CCNC: 110 U/L (ref 12–78)
ANION GAP SERPL CALCULATED.3IONS-SCNC: 5 MMOL/L (ref 4–13)
AST SERPL W P-5'-P-CCNC: 64 U/L (ref 5–45)
BASOPHILS # BLD AUTO: 0.09 THOUSANDS/ΜL (ref 0–0.1)
BASOPHILS NFR BLD AUTO: 1 % (ref 0–1)
BILIRUB SERPL-MCNC: 0.29 MG/DL (ref 0.2–1)
BUN SERPL-MCNC: 13 MG/DL (ref 5–25)
CALCIUM SERPL-MCNC: 8.8 MG/DL (ref 8.3–10.1)
CHLORIDE SERPL-SCNC: 104 MMOL/L (ref 100–108)
CO2 SERPL-SCNC: 27 MMOL/L (ref 21–32)
CREAT SERPL-MCNC: 0.83 MG/DL (ref 0.6–1.3)
EOSINOPHIL # BLD AUTO: 0.51 THOUSAND/ΜL (ref 0–0.61)
EOSINOPHIL NFR BLD AUTO: 4 % (ref 0–6)
ERYTHROCYTE [DISTWIDTH] IN BLOOD BY AUTOMATED COUNT: 13.5 % (ref 11.6–15.1)
GFR SERPL CREATININE-BSD FRML MDRD: 121 ML/MIN/1.73SQ M
GLUCOSE SERPL-MCNC: 101 MG/DL (ref 65–140)
HCT VFR BLD AUTO: 44.6 % (ref 36.5–49.3)
HGB BLD-MCNC: 14.7 G/DL (ref 12–17)
IMM GRANULOCYTES # BLD AUTO: 0.1 THOUSAND/UL (ref 0–0.2)
IMM GRANULOCYTES NFR BLD AUTO: 1 % (ref 0–2)
LYMPHOCYTES # BLD AUTO: 3.67 THOUSANDS/ΜL (ref 0.6–4.47)
LYMPHOCYTES NFR BLD AUTO: 28 % (ref 14–44)
MCH RBC QN AUTO: 29.7 PG (ref 26.8–34.3)
MCHC RBC AUTO-ENTMCNC: 33 G/DL (ref 31.4–37.4)
MCV RBC AUTO: 90 FL (ref 82–98)
MONOCYTES # BLD AUTO: 0.76 THOUSAND/ΜL (ref 0.17–1.22)
MONOCYTES NFR BLD AUTO: 6 % (ref 4–12)
NEUTROPHILS # BLD AUTO: 8.12 THOUSANDS/ΜL (ref 1.85–7.62)
NEUTS SEG NFR BLD AUTO: 60 % (ref 43–75)
NRBC BLD AUTO-RTO: 0 /100 WBCS
PLATELET # BLD AUTO: 234 THOUSANDS/UL (ref 149–390)
PMV BLD AUTO: 10.6 FL (ref 8.9–12.7)
POTASSIUM SERPL-SCNC: 3.5 MMOL/L (ref 3.5–5.3)
PROT SERPL-MCNC: 7 G/DL (ref 6.4–8.2)
RBC # BLD AUTO: 4.95 MILLION/UL (ref 3.88–5.62)
SODIUM SERPL-SCNC: 136 MMOL/L (ref 136–145)
WBC # BLD AUTO: 13.25 THOUSAND/UL (ref 4.31–10.16)

## 2019-06-19 PROCEDURE — 36415 COLL VENOUS BLD VENIPUNCTURE: CPT | Performed by: EMERGENCY MEDICINE

## 2019-06-19 PROCEDURE — 85025 COMPLETE CBC W/AUTO DIFF WBC: CPT | Performed by: EMERGENCY MEDICINE

## 2019-06-19 PROCEDURE — 99283 EMERGENCY DEPT VISIT LOW MDM: CPT | Performed by: EMERGENCY MEDICINE

## 2019-06-19 PROCEDURE — 80053 COMPREHEN METABOLIC PANEL: CPT | Performed by: EMERGENCY MEDICINE

## 2019-06-19 PROCEDURE — 99284 EMERGENCY DEPT VISIT MOD MDM: CPT

## 2019-06-26 ENCOUNTER — TELEPHONE (OUTPATIENT)
Dept: SLEEP CENTER | Facility: CLINIC | Age: 29
End: 2019-06-26

## 2019-06-28 ENCOUNTER — APPOINTMENT (EMERGENCY)
Dept: NON INVASIVE DIAGNOSTICS | Facility: HOSPITAL | Age: 29
End: 2019-06-28
Payer: COMMERCIAL

## 2019-06-28 ENCOUNTER — HOSPITAL ENCOUNTER (EMERGENCY)
Facility: HOSPITAL | Age: 29
Discharge: HOME | End: 2019-06-28
Attending: EMERGENCY MEDICINE | Admitting: EMERGENCY MEDICINE
Payer: COMMERCIAL

## 2019-06-28 VITALS
RESPIRATION RATE: 18 BRPM | TEMPERATURE: 97.9 F | OXYGEN SATURATION: 95 % | BODY MASS INDEX: 46.11 KG/M2 | HEART RATE: 85 BPM | DIASTOLIC BLOOD PRESSURE: 66 MMHG | HEIGHT: 68 IN | SYSTOLIC BLOOD PRESSURE: 140 MMHG | WEIGHT: 304.24 LBS

## 2019-06-28 DIAGNOSIS — L03.90 CELLULITIS: Primary | ICD-10-CM

## 2019-06-28 LAB
ALBUMIN SERPL BCP-MCNC: 3.6 G/DL (ref 3.5–5)
ALP SERPL-CCNC: 69 U/L (ref 46–116)
ALT SERPL W P-5'-P-CCNC: 106 U/L (ref 12–78)
ANION GAP SERPL CALCULATED.3IONS-SCNC: 7 MMOL/L (ref 4–13)
AST SERPL W P-5'-P-CCNC: 59 U/L (ref 5–45)
BASOPHILS # BLD AUTO: 0.09 THOUSANDS/ΜL (ref 0–0.1)
BASOPHILS NFR BLD AUTO: 1 % (ref 0–1)
BILIRUB SERPL-MCNC: 0.34 MG/DL (ref 0.2–1)
BILIRUB UR QL STRIP: NEGATIVE
BUN SERPL-MCNC: 13 MG/DL (ref 5–25)
CALCIUM SERPL-MCNC: 9.1 MG/DL (ref 8.3–10.1)
CHLORIDE SERPL-SCNC: 104 MMOL/L (ref 100–108)
CLARITY UR: CLEAR
CLARITY, POC: CLEAR
CO2 SERPL-SCNC: 26 MMOL/L (ref 21–32)
COLOR UR: YELLOW
COLOR, POC: YELLOW
CREAT SERPL-MCNC: 0.76 MG/DL (ref 0.6–1.3)
DEPRECATED D DIMER PPP: 531 NG/ML (FEU)
EOSINOPHIL # BLD AUTO: 0.49 THOUSAND/ΜL (ref 0–0.61)
EOSINOPHIL NFR BLD AUTO: 4 % (ref 0–6)
ERYTHROCYTE [DISTWIDTH] IN BLOOD BY AUTOMATED COUNT: 13.2 % (ref 11.6–15.1)
GFR SERPL CREATININE-BSD FRML MDRD: 126 ML/MIN/1.73SQ M
GLUCOSE SERPL-MCNC: 80 MG/DL (ref 65–140)
GLUCOSE UR STRIP-MCNC: NEGATIVE MG/DL
HCT VFR BLD AUTO: 44.2 % (ref 36.5–49.3)
HGB BLD-MCNC: 14.9 G/DL (ref 12–17)
HGB UR QL STRIP.AUTO: NEGATIVE
IMM GRANULOCYTES # BLD AUTO: 0.1 THOUSAND/UL (ref 0–0.2)
IMM GRANULOCYTES NFR BLD AUTO: 1 % (ref 0–2)
KETONES UR STRIP-MCNC: NEGATIVE MG/DL
LEUKOCYTE ESTERASE UR QL STRIP: NEGATIVE
LYMPHOCYTES # BLD AUTO: 3.76 THOUSANDS/ΜL (ref 0.6–4.47)
LYMPHOCYTES NFR BLD AUTO: 29 % (ref 14–44)
MCH RBC QN AUTO: 30.3 PG (ref 26.8–34.3)
MCHC RBC AUTO-ENTMCNC: 33.7 G/DL (ref 31.4–37.4)
MCV RBC AUTO: 90 FL (ref 82–98)
MONOCYTES # BLD AUTO: 0.86 THOUSAND/ΜL (ref 0.17–1.22)
MONOCYTES NFR BLD AUTO: 7 % (ref 4–12)
NEUTROPHILS # BLD AUTO: 7.72 THOUSANDS/ΜL (ref 1.85–7.62)
NEUTS SEG NFR BLD AUTO: 58 % (ref 43–75)
NITRITE UR QL STRIP: NEGATIVE
NRBC BLD AUTO-RTO: 0 /100 WBCS
NT-PROBNP SERPL-MCNC: <5 PG/ML
PH UR STRIP.AUTO: 7 [PH] (ref 4.5–8)
PLATELET # BLD AUTO: 236 THOUSANDS/UL (ref 149–390)
PMV BLD AUTO: 10.2 FL (ref 8.9–12.7)
POTASSIUM SERPL-SCNC: 3.6 MMOL/L (ref 3.5–5.3)
PROT SERPL-MCNC: 7.1 G/DL (ref 6.4–8.2)
PROT UR STRIP-MCNC: NEGATIVE MG/DL
RBC # BLD AUTO: 4.92 MILLION/UL (ref 3.88–5.62)
SODIUM SERPL-SCNC: 137 MMOL/L (ref 136–145)
SP GR UR STRIP.AUTO: 1.02 (ref 1–1.03)
UROBILINOGEN UR QL STRIP.AUTO: 0.2 E.U./DL
WBC # BLD AUTO: 13.02 THOUSAND/UL (ref 4.31–10.16)

## 2019-06-28 PROCEDURE — 85025 COMPLETE CBC W/AUTO DIFF WBC: CPT | Performed by: EMERGENCY MEDICINE

## 2019-06-28 PROCEDURE — 99284 EMERGENCY DEPT VISIT MOD MDM: CPT | Performed by: EMERGENCY MEDICINE

## 2019-06-28 PROCEDURE — 81003 URINALYSIS AUTO W/O SCOPE: CPT

## 2019-06-28 PROCEDURE — 80053 COMPREHEN METABOLIC PANEL: CPT | Performed by: EMERGENCY MEDICINE

## 2019-06-28 PROCEDURE — 83880 ASSAY OF NATRIURETIC PEPTIDE: CPT | Performed by: EMERGENCY MEDICINE

## 2019-06-28 PROCEDURE — 93970 EXTREMITY STUDY: CPT

## 2019-06-28 PROCEDURE — 36415 COLL VENOUS BLD VENIPUNCTURE: CPT | Performed by: EMERGENCY MEDICINE

## 2019-06-28 PROCEDURE — 85379 FIBRIN DEGRADATION QUANT: CPT | Performed by: EMERGENCY MEDICINE

## 2019-06-28 PROCEDURE — 99284 EMERGENCY DEPT VISIT MOD MDM: CPT

## 2019-06-28 RX ORDER — CEPHALEXIN 250 MG/1
500 CAPSULE ORAL 4 TIMES DAILY
Qty: 56 CAPSULE | Refills: 0 | Status: SHIPPED | OUTPATIENT
Start: 2019-06-28 | End: 2019-07-05

## 2019-06-28 RX ORDER — CEPHALEXIN 250 MG/1
500 CAPSULE ORAL ONCE
Status: COMPLETED | OUTPATIENT
Start: 2019-06-28 | End: 2019-06-28

## 2019-06-28 RX ADMIN — CEPHALEXIN 500 MG: 250 CAPSULE ORAL at 17:35

## 2019-06-28 NOTE — ED PROVIDER NOTES
History  Chief Complaint   Patient presents with    Foot Swelling     Patient reports swelling starting in his feet for about 1 month, states swelling is going up his leg  35 yo    Mild intellectual disability poor historian  For 1 mo bilateral ankle/foot swelling  Pitting edema to both calves  Right dorsum of foot with ulcer with serous drainage for 3-4 days - no surrounding erythema  1cm in diameter  He reports that he walks everywhere he lives with his mom his mom cannot drive either  While on cell phone patient reports 9/10 pain  Been worse over 2 days    Seen 7 mo ago for bruising on both ankles - Told to elevate his feet   Seen here 17 days ago - cellulitis of left foot with spread to right foot  Was on keflex - finished course without resolution    He's having difficulty walking 2/2 pain    ROS  No systemic signs, no fever, no chills, no n/v            Prior to Admission Medications   Prescriptions Last Dose Informant Patient Reported? Taking? LYRICA 50 MG capsule   Yes Yes   Sig: Take 50 mg by mouth 2 (two) times a day   VENTOLIN  (90 Base) MCG/ACT inhaler   Yes Yes   Sig: as needed   diphenhydrAMINE (BENADRYL) 25 mg capsule   Yes Yes   Sig: Take 25 mg by mouth every 6 (six) hours as needed      Facility-Administered Medications: None       Past Medical History:   Diagnosis Date    Asthma     Depression     Seizure ([facility] [address] )        History reviewed  No pertinent surgical history  Family History   Problem Relation Age of Onset    Mental illness Mother     Mental illness Sister      I have reviewed and agree with the history as documented  Social History     Tobacco Use    Smoking status: Current Every Day Smoker     Packs/day: 0 50     Types: Cigarettes    Smokeless tobacco: Never Used   Substance Use Topics    Alcohol use: No    Drug use: No        Review of Systems   Constitutional: Negative for activity change, chills, diaphoresis, fatigue and fever     HENT: Negative for congestion, postnasal drip, rhinorrhea, sneezing, sore throat and trouble swallowing  Eyes: Negative for visual disturbance  Respiratory: Negative for cough, chest tightness, shortness of breath and wheezing  Cardiovascular: Positive for leg swelling  Negative for chest pain  Gastrointestinal: Negative for abdominal distention, abdominal pain, blood in stool, constipation, diarrhea, nausea and vomiting  Genitourinary: Negative for decreased urine volume, dysuria, flank pain, frequency, hematuria and urgency  Musculoskeletal: Positive for gait problem  Skin: Positive for rash  Negative for color change  Neurological: Negative for syncope, weakness, light-headedness and headaches  Psychiatric/Behavioral: Negative for confusion and sleep disturbance  All other systems reviewed and are negative  Physical Exam  ED Triage Vitals [06/28/19 1531]   Temperature Pulse Respirations Blood Pressure SpO2   97 9 °F (36 6 °C) 98 18 147/71 98 %      Temp Source Heart Rate Source Patient Position - Orthostatic VS BP Location FiO2 (%)   Oral Monitor Sitting Right arm --      Pain Score       Worst Possible Pain             Orthostatic Vital Signs  Vitals:    06/28/19 1600 06/28/19 1752 06/28/19 1800 06/28/19 1951   BP: 144/80 135/64 135/64 140/66   Pulse: 98 92 86 85   Patient Position - Orthostatic VS: Sitting Sitting Sitting Lying       Physical Exam   Constitutional: He is oriented to person, place, and time  He appears well-developed and well-nourished  No distress  HENT:   Head: Normocephalic and atraumatic  Nose: Nose normal    Eyes: Pupils are equal, round, and reactive to light  Conjunctivae and EOM are normal  No scleral icterus  Neck: Normal range of motion  Neck supple  No tracheal deviation present  Cardiovascular: Normal rate, regular rhythm, normal heart sounds and intact distal pulses  No murmur heard  Pulmonary/Chest: Effort normal and breath sounds normal  No stridor   No respiratory distress  He has no wheezes  Abdominal: Soft  Bowel sounds are normal  He exhibits no distension  There is no tenderness  There is no guarding  Musculoskeletal: Normal range of motion  He exhibits no edema, tenderness or deformity  Neurological: He is alert and oriented to person, place, and time  No cranial nerve deficit or sensory deficit  He exhibits normal muscle tone  Skin: Skin is warm and dry  Capillary refill takes less than 2 seconds  He is not diaphoretic  Bilateral leg sunburned difficult to distinguish erythema    Multiple healed pinpoint ulcers over the arms and legs patient is picking at these ulcers    One with serous drainage on the back of the foot fluctuance no purulence drainage no evidence of infection    Both lower extremities edematous pitting mildly tender    Bottoms of the feet covered in dirt   Psychiatric: He has a normal mood and affect  His behavior is normal  Judgment and thought content normal    Nursing note and vitals reviewed        ED Medications  Medications   cephalexin (KEFLEX) capsule 500 mg (500 mg Oral Given 6/28/19 1735)       Diagnostic Studies  Results Reviewed     Procedure Component Value Units Date/Time    POCT urinalysis dipstick [[id number]]  (Normal) Resulted:  06/28/19 1955    Lab Status:  Final result Specimen:  Urine Updated:  06/28/19 1955     Color, UA yellow     Clarity, UA clear    ED Urine Macroscopic [[id number]] Collected:  06/28/19 1958    Lab Status:  Final result Specimen:  Urine Updated:  06/28/19 1954     Color, UA Yellow     Clarity, UA Clear     pH, UA 7 0     Leukocytes, UA Negative     Nitrite, UA Negative     Protein, UA Negative mg/dl      Glucose, UA Negative mg/dl      Ketones, UA Negative mg/dl      Urobilinogen, UA 0 2 E U /dl      Bilirubin, UA Negative     Blood, UA Negative     Specific Gravity, UA 1 020    Narrative:       CLINITEK RESULT    NT-BNP PRO (BNP for [place], [place], [place], [place], [place], [facility], [facility], [place] campuses) [[id number]] (Normal) Collected:  06/28/19 1712    Lab Status:  Final result Specimen:  Blood from Arm, Left Updated:  06/28/19 1747     NT-proBNP <5 pg/mL     Comprehensive metabolic panel [[id number]]  (Abnormal) Collected:  06/28/19 1712    Lab Status:  Final result Specimen:  Blood from Arm, Left Updated:  06/28/19 1746     Sodium 137 mmol/L      Potassium 3 6 mmol/L      Chloride 104 mmol/L      CO2 26 mmol/L      ANION GAP 7 mmol/L      BUN 13 mg/dL      Creatinine 0 76 mg/dL      Glucose 80 mg/dL      Calcium 9 1 mg/dL      AST 59 U/L       U/L      Alkaline Phosphatase 69 U/L      Total Protein 7 1 g/dL      Albumin 3 6 g/dL      Total Bilirubin 0 34 mg/dL      eGFR 126 ml/min/1 73sq m     Narrative:       National Kid[employer] Foundation guidelines for Chronic Kidney Disease (CKD):     Stage 1 with normal or high GFR (GFR > 90 mL/min/1 73 square meters)    Stage 2 Mild CKD (GFR = 60-89 mL/min/1 73 square meters)    Stage 3A Moderate CKD (GFR = 45-59 mL/min/1 73 square meters)    Stage 3B Moderate CKD (GFR = 30-44 mL/min/1 73 square meters)    Stage 4 Severe CKD (GFR = 15-29 mL/min/1 73 square meters)    Stage 5 End Stage CKD (GFR <15 mL/min/1 73 square meters)  Note: GFR calculation is accurate only with a steady state creatinine    D-Dimer [[id number]]  (Abnormal) Collected:  06/28/19 1712    Lab Status:  Final result Specimen:  Blood from Arm, Left Updated:  06/28/19 1745     D-Dimer, Quant 531 ng/ml (FEU)     CBC and differential [[id number]]  (Abnormal) Collected:  06/28/19 1712    Lab Status:  Final result Specimen:  Blood from Arm, Left Updated:  06/28/19 1724     WBC 13 02 Thousand/uL      RBC 4 92 Million/uL      Hemoglobin 14 9 g/dL      Hematocrit 44 2 %      MCV 90 fL      MCH 30 3 pg      MCHC 33 7 g/dL      RDW 13 2 %      MPV 10 2 fL      Platelets 955 Thousands/uL      nRBC 0 /100 WBCs      Neutrophils Relative 58 %      Immat GRANS % 1 %      Lymphocytes Relative 29 %      Monocytes Relative 7 %      Eosinophils Relative 4 %      Basophils Relative 1 %      Neutrophils Absolute 7 72 Thousands/µL      Immature Grans Absolute 0 10 Thousand/uL      Lymphocytes Absolute 3 76 Thousands/µL      Monocytes Absolute 0 86 Thousand/µL      Eosinophils Absolute 0 49 Thousand/µL      Basophils Absolute 0 09 Thousands/µL                  VAS lower limb venous duplex study, complete bilateral   Final Result by Omar Ruiz MD (06/29 5781)            Procedures  Procedures        ED Course                               MDM  Number of Diagnoses or Management Options  Cellulitis:   Diagnosis management comments: Patient given a script for compression socks    Patient given a script for Keflex      Disposition  Final diagnoses:   Cellulitis     Time reflects when diagnosis was documented in both MDM as applicable and the Disposition within this note     Time User Action Codes Description Comment    6/28/2019  7:50 PM Laci Manzano Add [L03 90] Cellulitis       ED Disposition     ED Disposition Condition Date/Time Comment    Discharge Stable Fri Jun 28, 2019  7:50 PM Meryle Applebaum discharge to home/self care              Follow-up Information     Follow up With Specialties Details Why [address], CRNP Nurse Practitioner Call in 1 day  [facility] [address] [facility]  [phone]            Discharge Medication List as of 6/28/2019  8:11 PM      START taking these medications    Details   cephalexin (KEFLEX) 250 mg capsule Take 2 capsules (500 mg total) by mouth 4 (four) times a day for 7 days, Starting Fri 6/28/2019, Until Fri 7/5/2019, Print         CONTINUE these medications which have NOT CHANGED    Details   diphenhydrAMINE (BENADRYL) 25 mg capsule Take 25 mg by mouth every 6 (six) hours as needed, Starting Sun 10/25/2015, Historical Med      LYRICA 50 MG capsule Take 50 mg by mouth 2 (two) times a day, Starting Wed 4/10/2019, Historical Med      VENTOLIN  (90 Base) MCG/ACT inhaler as needed, Starting Mon 3/25/2019, Historical Med           Outpatient Discharge Orders   Compression [facility]       ED Provider  Attending physically available and evaluated Nathaniel Jaimes I managed the patient along with the ED Attending      Electronically Signed by         Kimberly Piedra MD  06/29/19 8591

## 2019-06-28 NOTE — ED ATTENDING ATTESTATION
ICristine DO, saw and evaluated the patient  I have discussed the patient with the resident/non-physician practitioner and agree with the resident's/non-physician practitioner's findings, Plan of Care, and MDM as documented in the resident's/non-physician practitioner's note, except where noted  All available labs and Radiology studies were reviewed  I was present for key portions of any procedure(s) performed by the resident/non-physician practitioner and I was immediately available to provide assistance  At this point I agree with the current assessment done in the Emergency Department  I have conducted an independent evaluation of this patient a history and physical is as follows:      Critical Care Time  Procedures     34 yr male to the ED with BLEE for months that is getting worse  No NSAIDs, chest pain, sob, signif salt, abd pain, yolanda tenderness, clot hx, new meds  Has not seen fam doc yet  + smoker  Exm; lungs: cta  Heart: rrr wo mrg  BLEE to knees with some tenderness and sunburn  Mult superficial cut from his cat  No nodes in groin  Feet black from walking barefoot  Pln: doppler, lab, BNP and prob dc to see primary with tx with Keflex

## 2019-06-28 NOTE — ED NOTES
Vascular at bedside      Alyssa Carroll, [facility]  06/28/19 9622

## 2019-06-29 PROCEDURE — 93970 EXTREMITY STUDY: CPT | Performed by: SURGERY

## 2019-06-30 PROBLEM — G40.909 NONINTRACTABLE EPILEPSY WITHOUT STATUS EPILEPTICUS (HCC): Status: ACTIVE | Noted: 2019-06-30

## 2019-07-09 ENCOUNTER — HOSPITAL ENCOUNTER (EMERGENCY)
Facility: HOSPITAL | Age: 29
Discharge: HOME | End: 2019-07-09
Attending: EMERGENCY MEDICINE
Payer: COMMERCIAL

## 2019-07-09 VITALS
OXYGEN SATURATION: 97 % | HEART RATE: 87 BPM | SYSTOLIC BLOOD PRESSURE: 170 MMHG | BODY MASS INDEX: 49.42 KG/M2 | RESPIRATION RATE: 18 BRPM | WEIGHT: 315 LBS | TEMPERATURE: 97.9 F | DIASTOLIC BLOOD PRESSURE: 94 MMHG

## 2019-07-09 DIAGNOSIS — L03.119 CELLULITIS OF FOOT: Primary | ICD-10-CM

## 2019-07-09 LAB
ANION GAP SERPL CALCULATED.3IONS-SCNC: 5 MMOL/L (ref 4–13)
BASOPHILS # BLD AUTO: 0.08 THOUSANDS/ΜL (ref 0–0.1)
BASOPHILS NFR BLD AUTO: 1 % (ref 0–1)
BUN SERPL-MCNC: 14 MG/DL (ref 5–25)
CALCIUM SERPL-MCNC: 8.7 MG/DL (ref 8.3–10.1)
CHLORIDE SERPL-SCNC: 105 MMOL/L (ref 100–108)
CO2 SERPL-SCNC: 31 MMOL/L (ref 21–32)
CREAT SERPL-MCNC: 0.77 MG/DL (ref 0.6–1.3)
EOSINOPHIL # BLD AUTO: 0.49 THOUSAND/ΜL (ref 0–0.61)
EOSINOPHIL NFR BLD AUTO: 4 % (ref 0–6)
ERYTHROCYTE [DISTWIDTH] IN BLOOD BY AUTOMATED COUNT: 13.4 % (ref 11.6–15.1)
GFR SERPL CREATININE-BSD FRML MDRD: 125 ML/MIN/1.73SQ M
GLUCOSE SERPL-MCNC: 94 MG/DL (ref 65–140)
HCT VFR BLD AUTO: 46.6 % (ref 36.5–49.3)
HGB BLD-MCNC: 15.1 G/DL (ref 12–17)
IMM GRANULOCYTES # BLD AUTO: 0.15 THOUSAND/UL (ref 0–0.2)
IMM GRANULOCYTES NFR BLD AUTO: 1 % (ref 0–2)
LYMPHOCYTES # BLD AUTO: 3.3 THOUSANDS/ΜL (ref 0.6–4.47)
LYMPHOCYTES NFR BLD AUTO: 27 % (ref 14–44)
MCH RBC QN AUTO: 29.7 PG (ref 26.8–34.3)
MCHC RBC AUTO-ENTMCNC: 32.4 G/DL (ref 31.4–37.4)
MCV RBC AUTO: 92 FL (ref 82–98)
MONOCYTES # BLD AUTO: 0.71 THOUSAND/ΜL (ref 0.17–1.22)
MONOCYTES NFR BLD AUTO: 6 % (ref 4–12)
NEUTROPHILS # BLD AUTO: 7.34 THOUSANDS/ΜL (ref 1.85–7.62)
NEUTS SEG NFR BLD AUTO: 61 % (ref 43–75)
NRBC BLD AUTO-RTO: 0 /100 WBCS
PLATELET # BLD AUTO: 245 THOUSANDS/UL (ref 149–390)
PMV BLD AUTO: 10.7 FL (ref 8.9–12.7)
POTASSIUM SERPL-SCNC: 4.8 MMOL/L (ref 3.5–5.3)
RBC # BLD AUTO: 5.09 MILLION/UL (ref 3.88–5.62)
SODIUM SERPL-SCNC: 141 MMOL/L (ref 136–145)
WBC # BLD AUTO: 12.07 THOUSAND/UL (ref 4.31–10.16)

## 2019-07-09 PROCEDURE — 85025 COMPLETE CBC W/AUTO DIFF WBC: CPT | Performed by: EMERGENCY MEDICINE

## 2019-07-09 PROCEDURE — 80048 BASIC METABOLIC PNL TOTAL CA: CPT | Performed by: EMERGENCY MEDICINE

## 2019-07-09 PROCEDURE — 36415 COLL VENOUS BLD VENIPUNCTURE: CPT | Performed by: EMERGENCY MEDICINE

## 2019-07-09 PROCEDURE — 99284 EMERGENCY DEPT VISIT MOD MDM: CPT

## 2019-07-09 PROCEDURE — 99283 EMERGENCY DEPT VISIT LOW MDM: CPT | Performed by: EMERGENCY MEDICINE

## 2019-07-09 PROCEDURE — 96365 THER/PROPH/DIAG IV INF INIT: CPT

## 2019-07-09 RX ORDER — DOXYCYCLINE HYCLATE 100 MG/1
100 CAPSULE ORAL 2 TIMES DAILY
Qty: 14 CAPSULE | Refills: 0 | Status: SHIPPED | OUTPATIENT
Start: 2019-07-09 | End: 2019-07-16

## 2019-07-09 RX ADMIN — SODIUM CHLORIDE 1000 ML: 0.9 INJECTION, SOLUTION INTRAVENOUS at 04:37

## 2019-07-09 RX ADMIN — DOXYCYCLINE 100 MG: 100 INJECTION, POWDER, LYOPHILIZED, FOR SOLUTION INTRAVENOUS at 04:37

## 2019-07-09 NOTE — ED ATTENDING ATTESTATION
I, [address] [place] [address], DO, saw and evaluated the patient  I have discussed the patient with the resident/non-physician practitioner and agree with the resident's/non-physician practitioner's findings, Plan of Care, and MDM as documented in the resident's/non-physician practitioner's note, except where noted  All available labs and Radiology studies were reviewed  I was present for key portions of any procedure(s) performed by the resident/non-physician practitioner and I was immediately available to provide assistance  At this point I agree with the current assessment done in the Emergency Department  I have conducted an independent evaluation of this patient a history and physical is as follows:     53-year-old male presents with right lower extremity pain, swelling and redness  Patient very poor historian  Has been seen here in the past for this and was given prescription for Keflex however patient states he is unable to take it  He states he often forgets  Pain has been worsening  No fevers  Denies chest pain or shortness of breath  Patient has swelling in both legs for which he is seeing his family doctor this month  On exam-no acute distress, heart regular, lungs clear, abdomen soft nontender, pitting edema bilateral lower extremities with erythema and warmth to touch in the right lower extremity    Plan-check labs, IV fluids, consider change antibiotic to a twice daily medicine incident 4 times a day versus admission due to inability to treat infection at home    Critical Care Time  Procedures

## 2019-07-09 NOTE — ED PROVIDER NOTES
History  Chief Complaint   Patient presents with    Foot Pain     pt recently dx with right foot cellulitis - placed on keflex  pt reports pain in right foot      Patient is a 65-year-old male with indwelling shoulder disability that presents for bilateral foot swelling/erythema/pain  Patient has been seen multiple times over the past several weeks for the above complaint and has been placed on Keflex  Due the patient's intellectual disability, he has been unable to take Keflex as ordered 4 times a day and has been taking intermittently instead  He has over half of the prescriptions still remaining on his person that should have been finished by now  Patient says that bilateral foot erythema/pain improved transiently taking the antibiotic but has since gotten worse  Patient describes pain over the dorsal aspect of his foot bilaterally and coming up is right lower leg  He has still been ambulatory on the legs  He denies any recent trauma to the legs  He has been dealing with worsening swelling over the past several months of unknown etiology  He denies any fevers, chills, p o  Intolerance  He denies any chest pain, dyspnea, abdominal pain  He has been eating and drinking normally over the past several days and has been moving his bowels and urinating  Prior to Admission Medications   Prescriptions Last Dose Informant Patient Reported? Taking? LYRICA 50 MG capsule   Yes Yes   Sig: Take 50 mg by mouth 2 (two) times a day   VENTOLIN  (90 Base) MCG/ACT inhaler   Yes Yes   Sig: as needed   diphenhydrAMINE (BENADRYL) 25 mg capsule   Yes Yes   Sig: Take 25 mg by mouth every 6 (six) hours as needed      Facility-Administered Medications: None       Past Medical History:   Diagnosis Date    Asthma     Depression     Seizure ([facility] [address] )        History reviewed  No pertinent surgical history      Family History   Problem Relation Age of Onset    Mental illness Mother     Mental illness Sister      I have reviewed and agree with the history as documented  Social History     Tobacco Use    Smoking status: Current Every Day Smoker     Packs/day: 0 50     Types: Cigarettes    Smokeless tobacco: Never Used   Substance Use Topics    Alcohol use: No    Drug use: No        Review of Systems   Constitutional: Negative for chills, diaphoresis, fatigue and fever  HENT: Negative for drooling, facial swelling, sore throat and trouble swallowing  Eyes: Negative for photophobia  Respiratory: Negative for cough, choking, chest tightness, shortness of breath, wheezing and stridor  Cardiovascular: Positive for leg swelling  Negative for chest pain and palpitations  Gastrointestinal: Negative for abdominal distention, abdominal pain, diarrhea, nausea and vomiting  Genitourinary: Negative for dysuria  Musculoskeletal: Negative for back pain, neck pain and neck stiffness  Skin: Positive for rash  Negative for color change and pallor  Neurological: Negative for dizziness, speech difficulty, weakness, light-headedness, numbness and headaches  Hematological: Negative for adenopathy  Psychiatric/Behavioral: Negative for agitation  All other systems reviewed and are negative  Physical Exam  ED Triage Vitals [07/09/19 0321]   Temperature Pulse Respirations Blood Pressure SpO2   97 9 °F (36 6 °C) 94 18 164/88 98 %      Temp Source Heart Rate Source Patient Position - Orthostatic VS BP Location FiO2 (%)   Oral Monitor Lying -- --      Pain Score       9             Orthostatic Vital Signs  Vitals:    07/09/19 0321 07/09/19 0325 07/09/19 0431   BP: 164/88 164/88 170/94   Pulse: 94 92 87   Patient Position - Orthostatic VS: Lying  Lying       Physical Exam   Constitutional: He is oriented to person, place, and time  He appears well-developed and well-nourished  No distress  HENT:   Head: Normocephalic  Eyes: Pupils are equal, round, and reactive to light   EOM are normal    Neck: Normal range of motion  Neck supple  Cardiovascular: Normal rate, regular rhythm, normal heart sounds and intact distal pulses  Exam reveals no gallop and no friction rub  No murmur heard  Pulmonary/Chest: Effort normal and breath sounds normal    Lungs are clear bilaterally   Abdominal: Soft  Bowel sounds are normal  He exhibits no distension  There is no tenderness  There is no guarding  Musculoskeletal: Normal range of motion  Patient with erythema/swelling of bilateral lower extremities  Tender in bilateral lower extremities along erythema/swelling  No open wounds noted   Neurological: He is alert and oriented to person, place, and time  No cranial nerve deficit or sensory deficit  He exhibits normal muscle tone  Skin: Capillary refill takes less than 2 seconds  Psychiatric: He has a normal mood and affect  His behavior is normal  Judgment and thought content normal    Vitals reviewed        ED Medications  Medications   doxycycline (VIBRAMYCIN) 100 mg in sodium chloride 0 9 % 100 mL IVPB (0 mg Intravenous Stopped 7/9/19 0548)   sodium chloride 0 9 % bolus 1,000 mL (0 mL Intravenous Stopped 7/9/19 0548)       Diagnostic Studies  Results Reviewed     Procedure Component Value Units Date/Time    Basic metabolic panel [[id number]] Collected:  07/09/19 0431    Lab Status:  Final result Specimen:  Blood from Arm, Left Updated:  07/09/19 0500     Sodium 141 mmol/L      Potassium 4 8 mmol/L      Chloride 105 mmol/L      CO2 31 mmol/L      ANION GAP 5 mmol/L      BUN 14 mg/dL      Creatinine 0 77 mg/dL      Glucose 94 mg/dL      Calcium 8 7 mg/dL      eGFR 125 ml/min/1 73sq m     Narrative:       [place] guidelines for Chronic Kidney Disease (CKD):     Stage 1 with normal or high GFR (GFR > 90 mL/min/1 73 square meters)    Stage 2 Mild CKD (GFR = 60-89 mL/min/1 73 square meters)    Stage 3A Moderate CKD (GFR = 45-59 mL/min/1 73 square meters)    Stage 3B Moderate CKD (GFR = 30-44 mL/min/1 73 square meters)    Stage 4 Severe CKD (GFR = 15-29 mL/min/1 73 square meters)    Stage 5 End Stage CKD (GFR <15 mL/min/1 73 square meters)  Note: GFR calculation is accurate only with a steady state creatinine    CBC and differential [[id number]]  (Abnormal) Collected:  07/09/19 0433    Lab Status:  Final result Specimen:  Blood from Arm, Left Updated:  07/09/19 0440     WBC 12 07 Thousand/uL      RBC 5 09 Million/uL      Hemoglobin 15 1 g/dL      Hematocrit 46 6 %      MCV 92 fL      MCH 29 7 pg      MCHC 32 4 g/dL      RDW 13 4 %      MPV 10 7 fL      Platelets 914 Thousands/uL      nRBC 0 /100 WBCs      Neutrophils Relative 61 %      Immat GRANS % 1 %      Lymphocytes Relative 27 %      Monocytes Relative 6 %      Eosinophils Relative 4 %      Basophils Relative 1 %      Neutrophils Absolute 7 34 Thousands/µL      Immature Grans Absolute 0 15 Thousand/uL      Lymphocytes Absolute 3 30 Thousands/µL      Monocytes Absolute 0 71 Thousand/µL      Eosinophils Absolute 0 49 Thousand/µL      Basophils Absolute 0 08 Thousands/µL                  No orders to display         Procedures  Procedures        ED Course                               MDM  Number of Diagnoses or Management Options  Cellulitis of foot: established and worsening  Diagnosis management comments: Patient is a 65-year-old male presents with lower extremity swelling/erythema consistent with cellulitis  Patient has not had adequate course of antibiotics due to difficulty taking antibiotic for time today  He will be started on doxycycline which will need to be taken 2 times a day  I instructed the patient to set alarms illness phone to remember to take the medication  He has an appointment with his primary care provider later this month and I advised him to keep it  Patient received IV dose of doxycycline here in the emergency department  CBC and BMP were unremarkable  Patient was advised to return to ED if he has worsening symptoms         Amount and/or Complexity of Data Reviewed  Clinical lab tests: ordered and reviewed  Tests in the radiology section of CPT®: ordered and reviewed    Risk of Complications, Morbidity, and/or Mortality  Presenting problems: moderate  Diagnostic procedures: low  Management options: low    Patient Progress  Patient progress: improved      Disposition  Final diagnoses:   Cellulitis of foot     Time reflects when diagnosis was documented in both MDM as applicable and the Disposition within this note     Time User Action Codes Description Comment    7/9/2019  5:23 AM Aziza Elisha Add [M18 277] Cellulitis of foot       ED Disposition     ED Disposition Condition Date/Time Comment    Discharge Stable Tue Jul 9, 2019  5:22 AM Gerardo Gr discharge to home/self care  Follow-up Information     Follow up With Specialties Details Why Contact Info Additional [address] Emergency Department Emergency Medicine  If symptoms worsen [address]  [phone]  ED, [address] [place] [address], [address] [facility], [place] Nurse Practitioner In 1 week  [address] [facility]  [phone]             Discharge Medication List as of 7/9/2019  5:24 AM      START taking these medications    Details   doxycycline hyclate (VIBRAMYCIN) 100 mg capsule Take 1 capsule (100 mg total) by mouth 2 (two) times a day for 7 days, Starting Tue 7/9/2019, Until Tue 7/16/2019, Print         CONTINUE these medications which have NOT CHANGED    Details   diphenhydrAMINE (BENADRYL) 25 mg capsule Take 25 mg by mouth every 6 (six) hours as needed, Starting Sun 10/25/2015, Historical Med      LYRICA 50 MG capsule Take 50 mg by mouth 2 (two) times a day, Starting Wed 4/10/2019, Historical Med      VENTOLIN  (90 Base) MCG/ACT inhaler as needed, Starting Mon 3/25/2019, Historical Med           No discharge procedures on file      ED Provider  Attending physically available and evaluated Gerardo Simón [place] managed the patient along with the ED Attending      Electronically Signed by         Chico Figueroa MD  07/09/19 1699

## 2019-07-16 ENCOUNTER — HOSPITAL ENCOUNTER (OUTPATIENT)
Dept: NEUROLOGY | Facility: AMBULATORY SURGERY CENTER | Age: 29
Discharge: HOME | End: 2019-07-16
Payer: COMMERCIAL

## 2019-07-16 DIAGNOSIS — G40.909 NONINTRACTABLE EPILEPSY WITHOUT STATUS EPILEPTICUS, UNSPECIFIED EPILEPSY TYPE (HCC): ICD-10-CM

## 2019-07-16 PROCEDURE — 95812 EEG 41-60 MINUTES: CPT | Performed by: PSYCHIATRY & NEUROLOGY

## 2019-07-16 PROCEDURE — 95819 EEG AWAKE AND ASLEEP: CPT

## 2019-07-18 ENCOUNTER — TELEPHONE (OUTPATIENT)
Dept: NEUROLOGY | Facility: CLINIC | Age: 29
End: 2019-07-18

## 2019-07-18 NOTE — TELEPHONE ENCOUNTER
----- Message from Andrea Arredondo MD sent at 7/18/2019  9:36 AM EDT -----  The recent EEG was normal  No change to the current plan based on this result  Please complete MRI brain as scheduled

## 2019-07-23 ENCOUNTER — APPOINTMENT (OUTPATIENT)
Dept: LAB | Facility: CLINIC | Age: 29
End: 2019-07-23
Payer: COMMERCIAL

## 2019-07-23 DIAGNOSIS — I10 ESSENTIAL HYPERTENSION, MALIGNANT: ICD-10-CM

## 2019-07-23 DIAGNOSIS — S93.431D SPRAIN OF TIBIOFIBULAR LIGAMENT OF RIGHT ANKLE, SUBSEQUENT ENCOUNTER: ICD-10-CM

## 2019-07-23 DIAGNOSIS — M25.462 SWELLING OF LEFT KNEE JOINT: ICD-10-CM

## 2019-07-23 DIAGNOSIS — M71.22 SYNOVIAL CYST OF LEFT POPLITEAL SPACE: Primary | ICD-10-CM

## 2019-07-23 LAB
ALBUMIN SERPL BCP-MCNC: 4.3 G/DL (ref 3.5–5)
ALP SERPL-CCNC: 79 U/L (ref 46–116)
ALT SERPL W P-5'-P-CCNC: 119 U/L (ref 12–78)
ANION GAP SERPL CALCULATED.3IONS-SCNC: 8 MMOL/L (ref 4–13)
AST SERPL W P-5'-P-CCNC: 64 U/L (ref 5–45)
BASOPHILS # BLD AUTO: 0.11 THOUSANDS/ΜL (ref 0–0.1)
BASOPHILS NFR BLD AUTO: 1 % (ref 0–1)
BILIRUB SERPL-MCNC: 0.47 MG/DL (ref 0.2–1)
BUN SERPL-MCNC: 11 MG/DL (ref 5–25)
CALCIUM SERPL-MCNC: 9.1 MG/DL (ref 8.3–10.1)
CHLORIDE SERPL-SCNC: 105 MMOL/L (ref 100–108)
CHOLEST SERPL-MCNC: 218 MG/DL (ref 50–200)
CO2 SERPL-SCNC: 28 MMOL/L (ref 21–32)
CREAT SERPL-MCNC: 0.84 MG/DL (ref 0.6–1.3)
EOSINOPHIL # BLD AUTO: 0.39 THOUSAND/ΜL (ref 0–0.61)
EOSINOPHIL NFR BLD AUTO: 3 % (ref 0–6)
ERYTHROCYTE [DISTWIDTH] IN BLOOD BY AUTOMATED COUNT: 13 % (ref 11.6–15.1)
ERYTHROCYTE [SEDIMENTATION RATE] IN BLOOD: 3 MM/HOUR (ref 0–10)
GFR SERPL CREATININE-BSD FRML MDRD: 121 ML/MIN/1.73SQ M
GLUCOSE P FAST SERPL-MCNC: 67 MG/DL (ref 65–99)
HCT VFR BLD AUTO: 51.5 % (ref 36.5–49.3)
HDLC SERPL-MCNC: 34 MG/DL (ref 40–60)
HGB BLD-MCNC: 17.1 G/DL (ref 12–17)
IMM GRANULOCYTES # BLD AUTO: 0.07 THOUSAND/UL (ref 0–0.2)
IMM GRANULOCYTES NFR BLD AUTO: 1 % (ref 0–2)
LDLC SERPL CALC-MCNC: 143 MG/DL (ref 0–100)
LYMPHOCYTES # BLD AUTO: 3.34 THOUSANDS/ΜL (ref 0.6–4.47)
LYMPHOCYTES NFR BLD AUTO: 27 % (ref 14–44)
MCH RBC QN AUTO: 29.7 PG (ref 26.8–34.3)
MCHC RBC AUTO-ENTMCNC: 33.2 G/DL (ref 31.4–37.4)
MCV RBC AUTO: 89 FL (ref 82–98)
MONOCYTES # BLD AUTO: 0.71 THOUSAND/ΜL (ref 0.17–1.22)
MONOCYTES NFR BLD AUTO: 6 % (ref 4–12)
NEUTROPHILS # BLD AUTO: 7.95 THOUSANDS/ΜL (ref 1.85–7.62)
NEUTS SEG NFR BLD AUTO: 62 % (ref 43–75)
NONHDLC SERPL-MCNC: 184 MG/DL
NRBC BLD AUTO-RTO: 0 /100 WBCS
PLATELET # BLD AUTO: 244 THOUSANDS/UL (ref 149–390)
PMV BLD AUTO: 10.7 FL (ref 8.9–12.7)
POTASSIUM SERPL-SCNC: 3.7 MMOL/L (ref 3.5–5.3)
PROT SERPL-MCNC: 7.7 G/DL (ref 6.4–8.2)
RBC # BLD AUTO: 5.76 MILLION/UL (ref 3.88–5.62)
SODIUM SERPL-SCNC: 141 MMOL/L (ref 136–145)
TRIGL SERPL-MCNC: 207 MG/DL
TSH SERPL DL<=0.05 MIU/L-ACNC: 2.5 UIU/ML (ref 0.36–3.74)
URATE SERPL-MCNC: 7.5 MG/DL (ref 4.2–8)
WBC # BLD AUTO: 12.57 THOUSAND/UL (ref 4.31–10.16)

## 2019-07-23 PROCEDURE — 85652 RBC SED RATE AUTOMATED: CPT

## 2019-07-23 PROCEDURE — 86618 LYME DISEASE ANTIBODY: CPT

## 2019-07-23 PROCEDURE — 85025 COMPLETE CBC W/AUTO DIFF WBC: CPT

## 2019-07-23 PROCEDURE — 80061 LIPID PANEL: CPT

## 2019-07-23 PROCEDURE — 84550 ASSAY OF BLOOD/URIC ACID: CPT

## 2019-07-23 PROCEDURE — 84443 ASSAY THYROID STIM HORMONE: CPT

## 2019-07-23 PROCEDURE — 80053 COMPREHEN METABOLIC PANEL: CPT

## 2019-07-23 PROCEDURE — 36415 COLL VENOUS BLD VENIPUNCTURE: CPT

## 2019-07-25 LAB
B BURGDOR IGG SER IA-ACNC: 0.05
B BURGDOR IGM SER IA-ACNC: 0.61

## 2019-08-12 ENCOUNTER — HOSPITAL ENCOUNTER (EMERGENCY)
Facility: HOSPITAL | Age: 29
Discharge: HOME | End: 2019-08-12
Attending: EMERGENCY MEDICINE
Payer: COMMERCIAL

## 2019-08-12 VITALS
TEMPERATURE: 97.4 F | OXYGEN SATURATION: 99 % | WEIGHT: 315 LBS | DIASTOLIC BLOOD PRESSURE: 93 MMHG | BODY MASS INDEX: 47.74 KG/M2 | HEART RATE: 78 BPM | SYSTOLIC BLOOD PRESSURE: 138 MMHG | HEIGHT: 68 IN | RESPIRATION RATE: 19 BRPM

## 2019-08-12 DIAGNOSIS — R56.9 SEIZURE-LIKE ACTIVITY (HCC): Primary | ICD-10-CM

## 2019-08-12 DIAGNOSIS — L73.9 FOLLICULITIS: ICD-10-CM

## 2019-08-12 LAB
ANION GAP SERPL CALCULATED.3IONS-SCNC: 4 MMOL/L (ref 4–13)
ATRIAL RATE: 81 BPM
BASOPHILS # BLD AUTO: 0.07 THOUSANDS/ΜL (ref 0–0.1)
BASOPHILS NFR BLD AUTO: 1 % (ref 0–1)
BUN SERPL-MCNC: 13 MG/DL (ref 5–25)
CALCIUM SERPL-MCNC: 9.1 MG/DL (ref 8.3–10.1)
CHLORIDE SERPL-SCNC: 103 MMOL/L (ref 100–108)
CO2 SERPL-SCNC: 30 MMOL/L (ref 21–32)
CREAT SERPL-MCNC: 0.86 MG/DL (ref 0.6–1.3)
EOSINOPHIL # BLD AUTO: 0.47 THOUSAND/ΜL (ref 0–0.61)
EOSINOPHIL NFR BLD AUTO: 4 % (ref 0–6)
ERYTHROCYTE [DISTWIDTH] IN BLOOD BY AUTOMATED COUNT: 13 % (ref 11.6–15.1)
GFR SERPL CREATININE-BSD FRML MDRD: 120 ML/MIN/1.73SQ M
GLUCOSE SERPL-MCNC: 87 MG/DL (ref 65–140)
HCT VFR BLD AUTO: 50.8 % (ref 36.5–49.3)
HGB BLD-MCNC: 16.6 G/DL (ref 12–17)
HOLD SPECIMEN: NORMAL
IMM GRANULOCYTES # BLD AUTO: 0.05 THOUSAND/UL (ref 0–0.2)
IMM GRANULOCYTES NFR BLD AUTO: 0 % (ref 0–2)
LYMPHOCYTES # BLD AUTO: 3.32 THOUSANDS/ΜL (ref 0.6–4.47)
LYMPHOCYTES NFR BLD AUTO: 29 % (ref 14–44)
MCH RBC QN AUTO: 29.7 PG (ref 26.8–34.3)
MCHC RBC AUTO-ENTMCNC: 32.7 G/DL (ref 31.4–37.4)
MCV RBC AUTO: 91 FL (ref 82–98)
MONOCYTES # BLD AUTO: 0.73 THOUSAND/ΜL (ref 0.17–1.22)
MONOCYTES NFR BLD AUTO: 6 % (ref 4–12)
NEUTROPHILS # BLD AUTO: 6.95 THOUSANDS/ΜL (ref 1.85–7.62)
NEUTS SEG NFR BLD AUTO: 60 % (ref 43–75)
NRBC BLD AUTO-RTO: 0 /100 WBCS
P AXIS: 14 DEGREES
PLATELET # BLD AUTO: 253 THOUSANDS/UL (ref 149–390)
PMV BLD AUTO: 11.3 FL (ref 8.9–12.7)
POTASSIUM SERPL-SCNC: 3.8 MMOL/L (ref 3.5–5.3)
PR INTERVAL: 152 MS
QRS AXIS: 17 DEGREES
QRSD INTERVAL: 96 MS
QT INTERVAL: 370 MS
QTC INTERVAL: 429 MS
RBC # BLD AUTO: 5.58 MILLION/UL (ref 3.88–5.62)
SODIUM SERPL-SCNC: 137 MMOL/L (ref 136–145)
T WAVE AXIS: 6 DEGREES
VENTRICULAR RATE: 81 BPM
WBC # BLD AUTO: 11.59 THOUSAND/UL (ref 4.31–10.16)

## 2019-08-12 PROCEDURE — 93005 ELECTROCARDIOGRAM TRACING: CPT

## 2019-08-12 PROCEDURE — 93010 ELECTROCARDIOGRAM REPORT: CPT | Performed by: INTERNAL MEDICINE

## 2019-08-12 PROCEDURE — 99284 EMERGENCY DEPT VISIT MOD MDM: CPT | Performed by: EMERGENCY MEDICINE

## 2019-08-12 PROCEDURE — 85025 COMPLETE CBC W/AUTO DIFF WBC: CPT | Performed by: EMERGENCY MEDICINE

## 2019-08-12 PROCEDURE — 36415 COLL VENOUS BLD VENIPUNCTURE: CPT

## 2019-08-12 PROCEDURE — 99284 EMERGENCY DEPT VISIT MOD MDM: CPT

## 2019-08-12 PROCEDURE — 80048 BASIC METABOLIC PNL TOTAL CA: CPT | Performed by: EMERGENCY MEDICINE

## 2019-08-12 RX ORDER — DOXYCYCLINE HYCLATE 100 MG/1
100 CAPSULE ORAL ONCE
Status: COMPLETED | OUTPATIENT
Start: 2019-08-12 | End: 2019-08-12

## 2019-08-12 RX ORDER — DOXYCYCLINE HYCLATE 100 MG/1
100 CAPSULE ORAL 2 TIMES DAILY
Qty: 20 CAPSULE | Refills: 0 | Status: SHIPPED | OUTPATIENT
Start: 2019-08-12 | End: 2019-08-19

## 2019-08-12 RX ADMIN — DOXYCYCLINE 100 MG: 100 CAPSULE ORAL at 09:53

## 2019-08-12 NOTE — ED ATTENDING ATTESTATION
Francesca Beatty DO, saw and evaluated the patient  I have discussed the patient with the resident/non-physician practitioner and agree with the resident's/non-physician practitioner's findings, Plan of Care, and MDM as documented in the resident's/non-physician practitioner's note, except where noted  All available labs and Radiology studies were reviewed  I was present for key portions of any procedure(s) performed by the resident/non-physician practitioner and I was immediately available to provide assistance  At this point I agree with the current assessment done in the Emergency Department  I have conducted an independent evaluation of this patient a history and physical is as follows:    34 yom with reported seizure this AM    Hx limited by cognitive delay  Past Medical History:   Diagnosis Date    Asthma     Depression     Seizure ([facility] [address] )      /81 (BP Location: Right arm)   Pulse 82   Temp (!) 97 4 °F (36 3 °C)   Resp 18   Ht 5' 8" (1 727 m)   Wt (!) 147 kg (324 lb 1 2 oz)   SpO2 99%   BMI 49 28 kg/m²   NAD, EOMI, PERRLA, CTA, RRR, Abd soft/NT, Ext NROM w rash on right inner thigh    ECG WNL    Patient has had normal EEG and has an MRI pending that he has not shown up for in the past   Re-evaluation by Neurology is penditgn after MRI of brain  Patient has yet to have any definitive evidence of epilepsy or epileptic seizures and he relates his issues to stress  He is currently not on any antiepileptics  Will screen electrolytes  Rash on right inner thigh       Dx  Seizure activity - f/u   Folliculitis - doxy, f/u           Critical Care Time  Procedures

## 2019-08-12 NOTE — ED PROVIDER NOTES
History  Chief Complaint   Patient presents with    Seizure - Prior Hx Of     pt reports he had a seizure lasting a few minutes today  pt reports his MD switched him from depakote to "something else" and now he is having seizures  HPI    35yo male pmhx intellectual disability, depression, asthma, and reported epilepsy presents for evaluation of seizure-like activity  Patient says he does not remember much of this morning but says he became lightheaded and the next thing he remembers is his girlfriend called 911  He thinks seizure lasted a few minutes  Patient says he was groggy afterwards but is feeling better now  Patient says stress triggers his seizures and he notes that he is under a great deal of stress  Denies recent fever, chills, chest pain, shortness of breath, nausea, vomiting, abdominal pain, diarrhea, dysuria, extremity numbness/tingling or weakness  Patient says he is now following with Neurology and not on an antiepileptic medications at this time  Recent EEG was normal, MRI brain pending  Prior to Admission Medications   Prescriptions Last Dose Informant Patient Reported? Taking? LYRICA 50 MG capsule   Yes Yes   Sig: Take 50 mg by mouth 2 (two) times a day   VENTOLIN  (90 Base) MCG/ACT inhaler   Yes Yes   Sig: as needed   diphenhydrAMINE (BENADRYL) 25 mg capsule   Yes Yes   Sig: Take 25 mg by mouth every 6 (six) hours as needed      Facility-Administered Medications: None       Past Medical History:   Diagnosis Date    Asthma     Depression     Seizure ([facility] [address] )        History reviewed  No pertinent surgical history  Family History   Problem Relation Age of Onset    Mental illness Mother     Mental illness Sister      I have reviewed and agree with the history as documented      Social History     Tobacco Use    Smoking status: Current Every Day Smoker     Packs/day: 0 50     Types: Cigarettes    Smokeless tobacco: Never Used   Substance Use Topics    Alcohol use: No    Drug use: No        Review of Systems   Constitutional: Negative for chills, diaphoresis, fatigue and fever  HENT: Negative for congestion, rhinorrhea and sore throat  Eyes: Negative for photophobia and visual disturbance  Respiratory: Negative for cough, chest tightness and shortness of breath  Cardiovascular: Negative for chest pain and palpitations  Gastrointestinal: Negative for abdominal pain, blood in stool, constipation, diarrhea, nausea and vomiting  Genitourinary: Negative for dysuria, frequency and hematuria  Musculoskeletal: Negative for back pain, gait problem, myalgias, neck pain and neck stiffness  Skin: Negative for pallor and rash  Neurological: Positive for seizures and light-headedness  Negative for weakness, numbness and headaches  Hematological: Negative for adenopathy  Does not bruise/bleed easily  All other systems reviewed and are negative  Physical Exam  ED Triage Vitals [08/12/19 0721]   Temperature Pulse Respirations Blood Pressure SpO2   (!) 97 4 °F (36 3 °C) 85 18 165/81 99 %      Temp src Heart Rate Source Patient Position - Orthostatic VS BP Location FiO2 (%)   -- Monitor Lying Right arm --      Pain Score       6             Orthostatic Vital Signs  Vitals:    08/12/19 0815 08/12/19 0915 08/12/19 0945 08/12/19 1045   BP: 168/71 154/60 144/63 138/93   Pulse: 82 66 70 78   Patient Position - Orthostatic VS: Sitting Sitting Sitting Sitting       Physical Exam   Constitutional: He is oriented to person, place, and time  He appears well-developed and well-nourished  No distress  Patient alert and oriented, appears well and non-toxic, in no acute distress    HENT:   Head: Normocephalic and atraumatic  Eyes: Pupils are equal, round, and reactive to light  Conjunctivae and EOM are normal    Neck: Normal range of motion  Neck supple  Cardiovascular: Normal rate, regular rhythm, normal heart sounds and intact distal pulses     Pulmonary/Chest: Effort normal and breath sounds normal  No respiratory distress  Abdominal: Soft  Bowel sounds are normal  He exhibits no distension  There is no tenderness  Musculoskeletal: Normal range of motion  Lymphadenopathy:     He has no cervical adenopathy  Neurological: He is alert and oriented to person, place, and time  No facial asymmetry noted, CN 2-12 intact, full ROM of upper and lower extremities, muscle strength 5/5 throughout, DTRs normal, sensation intact throughout   Skin: Skin is warm and dry  Capillary refill takes less than 2 seconds  No rash noted  He is not diaphoretic  There is erythema  No pallor  Inner thigh erythema, concerning for folliculitis    Psychiatric: He has a normal mood and affect  His behavior is normal  Judgment and thought content normal    Nursing note and vitals reviewed        ED Medications  Medications   doxycycline hyclate (VIBRAMYCIN) capsule 100 mg (100 mg Oral Given 8/12/19 0953)       Diagnostic Studies  Results Reviewed     Procedure Component Value Units Date/Time    CBC and differential [[id number]]  (Abnormal) Collected:  08/12/19 0723    Lab Status:  Final result Specimen:  Blood from Arm, Left Updated:  08/12/19 1033     WBC 11 59 Thousand/uL      RBC 5 58 Million/uL      Hemoglobin 16 6 g/dL      Hematocrit 50 8 %      MCV 91 fL      MCH 29 7 pg      MCHC 32 7 g/dL      RDW 13 0 %      MPV 11 3 fL      Platelets 086 Thousands/uL      nRBC 0 /100 WBCs      Neutrophils Relative 60 %      Immat GRANS % 0 %      Lymphocytes Relative 29 %      Monocytes Relative 6 %      Eosinophils Relative 4 %      Basophils Relative 1 %      Neutrophils Absolute 6 95 Thousands/µL      Immature Grans Absolute 0 05 Thousand/uL      Lymphocytes Absolute 3 32 Thousands/µL      Monocytes Absolute 0 73 Thousand/µL      Eosinophils Absolute 0 47 Thousand/µL      Basophils Absolute 0 07 Thousands/µL     [place] draw [[id number]] Collected:  08/12/19 0723    Lab Status:  Final result Specimen:  Blood from Arm, Left Updated:  08/12/19 0901    Narrative: The following orders were created for panel order [place] draw  Procedure                               Abnormality         Status                     ---------                               -----------         ------                     Vickye Commander Top on SUNQ[[id number]]                           Final result               Gold top on ANXO[[id number]]                                 Final result               Green / Yellow tube on TCWK[[id number]]                      Final result               Green / Black tube on SEQO[[id number]]                       Final result               Lavender Top 3 ml on WVXM[[id number]]                        Final result                 Please view results for these tests on the individual orders      Basic metabolic panel [[id number]] Collected:  08/12/19 0723    Lab Status:  Final result Specimen:  Blood from Arm, Left Updated:  08/12/19 7469     Sodium 137 mmol/L      Potassium 3 8 mmol/L      Chloride 103 mmol/L      CO2 30 mmol/L      ANION GAP 4 mmol/L      BUN 13 mg/dL      Creatinine 0 86 mg/dL      Glucose 87 mg/dL      Calcium 9 1 mg/dL      eGFR 120 ml/min/1 73sq m     Narrative:       [place] guidelines for Chronic Kidney Disease (CKD):     Stage 1 with normal or high GFR (GFR > 90 mL/min/1 73 square meters)    Stage 2 Mild CKD (GFR = 60-89 mL/min/1 73 square meters)    Stage 3A Moderate CKD (GFR = 45-59 mL/min/1 73 square meters)    Stage 3B Moderate CKD (GFR = 30-44 mL/min/1 73 square meters)    Stage 4 Severe CKD (GFR = 15-29 mL/min/1 73 square meters)    Stage 5 End Stage CKD (GFR <15 mL/min/1 73 square meters)  Note: GFR calculation is accurate only with a steady state creatinine                 No orders to display         Procedures  ECG 12 Lead Documentation Only  Date/Time: 8/12/2019 10:52 AM  Performed by: Annelise Hunter MD  Authorized by: Annelise Hunter MD     Indications / Diagnosis:  Seizure  ECG reviewed by me, the ED Provider: yes    Patient location:  ED and bedside  Previous ECG:     Previous ECG:  Compared to current    Similarity:  No change    Comparison to cardiac monitor: Yes    Interpretation:     Interpretation: normal    Rate:     ECG rate:  81    ECG rate assessment: normal    Rhythm:     Rhythm: sinus rhythm    Ectopy:     Ectopy: none    QRS:     QRS axis:  Normal    QRS intervals:  Normal  Conduction:     Conduction: normal    ST segments:     ST segments:  Normal  T waves:     T waves: flattening and inverted      Flattening:  AVF    Inverted:  III            ED Course  ED Course as of Aug 12 1709   Mon Aug 12, 2019   1046 Will p o  Challenge and trial ambulation will                                  MDM  Number of Diagnoses or Management Options  Folliculitis:   Seizure-like activity [facility]):   Diagnosis management comments: 33yo male presents for evaluation of seizure-like activity  Patient appears clinically well and is hemodynamically stable  Will obtain bmp, cbc, ekg and urinalysis  Disposition  Final diagnoses:   Seizure-like activity ([address] )   Folliculitis     Time reflects when diagnosis was documented in both MDM as applicable and the Disposition within this note     Time User Action Codes Description Comment    8/12/2019 11:07 AM Tarry Deems Add [R56 9] Seizure-like activity ([address] )     8/12/2019 11:07 AM Tarry Deems Add [J16 5] Folliculitis       ED Disposition     ED Disposition Condition Date/Time Comment    Discharge Stable Mon Aug 12, 2019 11:07 AM Mario Delgado discharge to home/self care              Follow-up Information     Follow up With Specialties Details Why [address]  Nurse Practitioner Go in 3 days As needed, If symptoms worsen [address] [place]      Nasreen Reagan MD Neurology Call in 3 days As needed, If symptoms worsen [address]  [address]  [phone] Discharge Medication List as of 8/12/2019 11:09 AM      START taking these medications    Details   doxycycline hyclate (VIBRAMYCIN) 100 mg capsule Take 1 capsule (100 mg total) by mouth 2 (two) times a day for 7 days, Starting Mon 8/12/2019, Until Mon 8/19/2019, Normal         CONTINUE these medications which have NOT CHANGED    Details   diphenhydrAMINE (BENADRYL) 25 mg capsule Take 25 mg by mouth every 6 (six) hours as needed, Starting Sun 10/25/2015, Historical Med      LYRICA 50 MG capsule Take 50 mg by mouth 2 (two) times a day, Starting Wed 4/10/2019, Historical Med      VENTOLIN  (90 Base) MCG/ACT inhaler as needed, Starting Mon 3/25/2019, Historical Med           No discharge procedures on file  ED Provider  Attending physically available and evaluated Yan Gibbons I managed the patient along with the ED Attending      Electronically Signed by         Fauzia Lopez MD  08/12/19 0168

## 2019-08-12 NOTE — ED NOTES
Lab called at this time reguarding CBC that has not yet resulted  CBC re-ordered and lab working on now        Justen Esquivel RN  08/12/19 5534

## 2019-08-15 ENCOUNTER — TELEPHONE (OUTPATIENT)
Dept: NEUROLOGY | Facility: CLINIC | Age: 29
End: 2019-08-15

## 2019-08-15 NOTE — TELEPHONE ENCOUNTER
Spoke with patient, patient states he no showed once due to over sleeping and a second no show due to losing paperwork  I then continued to help patient schedule new MRI appointment   Scheduled for 8/26 @ 7:30pm

## 2019-08-15 NOTE — TELEPHONE ENCOUNTER
----- Message from Marija Rosenbaum MD sent at 8/12/2019 12:07 PM EDT -----  Please inquire about reason for MRI now shows and encourage patient to keep currently scheduled appt for MRI  Thanks  Claudio Tobias ----- Message -----  From: Leah Wilson  Sent: 4/1/4849   1:25 PM EDT  To: Marija Rosenbaum MD    This is to notify you that your patient did not show for the MRI study  Patient was called and reschedule for the MRI study  This is the second time patient had a no show  Any question please call us at [phone]  Thank you      Leah Wilson  Radiology   [address]

## 2019-08-18 ENCOUNTER — HOSPITAL ENCOUNTER (OUTPATIENT)
Dept: SLEEP CENTER | Facility: CLINIC | Age: 29
Discharge: HOME | End: 2019-08-18
Payer: COMMERCIAL

## 2019-08-18 DIAGNOSIS — R06.81 APNEA: ICD-10-CM

## 2019-08-18 PROCEDURE — 95810 POLYSOM 6/> YRS 4/> PARAM: CPT

## 2019-08-19 NOTE — PROGRESS NOTES
Sleep Study Documentation    Pre-Sleep Study       Sleep testing procedure explained to patient:YES    Patient napped prior to study:YES- less than 30 minutes  Napped after 2PM: no    Caffeine:Dayshift worker after 12PM   Caffeine use:NO    Alcohol:Dayshift workers after 5PM: Alcohol use:NO    Typical day for patient:YES       Study Documentation    Sleep Study Indications: Snoring    Sleep Study: Diagnostic   Snore:Severe  Supplemental O2: no    O2 flow rate (L/min) range N/A  O2 flow rate (L/min) final N/A  Minimum SaO2 72%  Baseline SaO2 96%        EKG abnormalities: no     EEG abnormalities: no    Sleep Study Recorded < 2 hours: N/A    Sleep Study Recorded > 2 hours but incomplete study: N/A    Sleep Study Recorded 6 hours but no sleep obtained: NO    Patient classification: unemployed       Post-Sleep Study    Medication used at bedtime or during sleep study:NO    Patient reports time it took to fall asleep:less than 20 minutes    Patient reports waking up during study:3 or more times  Patient reports returning to sleep without difficulty  Patient reports sleeping 4 to 6 hours without dreaming  Patient reports sleep during study:better than usual    Patient rated sleepiness: Somewhat sleepy or tired    PAP treatment:no

## 2019-08-20 DIAGNOSIS — G47.33 OSA (OBSTRUCTIVE SLEEP APNEA): Primary | ICD-10-CM

## 2019-08-22 ENCOUNTER — TELEPHONE (OUTPATIENT)
Dept: SLEEP CENTER | Facility: CLINIC | Age: 29
End: 2019-08-22

## 2019-08-22 NOTE — TELEPHONE ENCOUNTER
Left message for patient to call office  Severe SAGE, recommend titration study    Will need consult with DR Keiko Tatum

## 2019-08-26 ENCOUNTER — HOSPITAL ENCOUNTER (OUTPATIENT)
Dept: RADIOLOGY | Facility: HOSPITAL | Age: 29
Discharge: HOME | End: 2019-08-26
Attending: PSYCHIATRY & NEUROLOGY
Payer: COMMERCIAL

## 2019-08-26 DIAGNOSIS — G40.909 NONINTRACTABLE EPILEPSY WITHOUT STATUS EPILEPTICUS, UNSPECIFIED EPILEPSY TYPE (HCC): ICD-10-CM

## 2019-08-26 PROCEDURE — 70553 MRI BRAIN STEM W/O & W/DYE: CPT

## 2019-08-26 PROCEDURE — A9585 GADOBUTROL INJECTION: HCPCS | Performed by: PSYCHIATRY & NEUROLOGY

## 2019-08-26 RX ADMIN — GADOBUTROL 14 ML: 604.72 INJECTION INTRAVENOUS at 20:28

## 2019-08-28 ENCOUNTER — TELEPHONE (OUTPATIENT)
Dept: NEUROLOGY | Facility: CLINIC | Age: 29
End: 2019-08-28

## 2019-08-28 ENCOUNTER — TELEPHONE (OUTPATIENT)
Dept: OTHER | Facility: HOSPITAL | Age: 29
End: 2019-08-28

## 2019-08-28 DIAGNOSIS — G93.5 CHIARI MALFORMATION TYPE I (HCC): ICD-10-CM

## 2019-08-28 DIAGNOSIS — G93.9 CEREBELLAR LESION: ICD-10-CM

## 2019-08-28 DIAGNOSIS — G93.9 PONTINE LESION: ICD-10-CM

## 2019-08-28 DIAGNOSIS — R90.89 ABNORMAL BRAIN MRI: Primary | ICD-10-CM

## 2019-08-28 NOTE — TELEPHONE ENCOUNTER
Stefania from [place] reading room states patient's brain MRI has significant findings  Please review, thanks!

## 2019-08-28 NOTE — TELEPHONE ENCOUNTER
Left  with patient to call me back regarding MRI results  I can be reached at [address] or my cell if he calls back  Plan is for repeat MRI in 6 months to re-evaluate this small lesion that is not likely causing any symptoms

## 2019-08-28 NOTE — TELEPHONE ENCOUNTER
Spoke with patient and discussed results  Please make sure that 6 month f/u MRI gets scheduled  Thanks

## 2019-09-18 ENCOUNTER — HOSPITAL ENCOUNTER (OUTPATIENT)
Dept: SLEEP CENTER | Facility: CLINIC | Age: 29
Discharge: HOME | End: 2019-09-18
Payer: COMMERCIAL

## 2019-09-18 DIAGNOSIS — G47.33 OSA (OBSTRUCTIVE SLEEP APNEA): ICD-10-CM

## 2019-09-18 PROCEDURE — 95811 POLYSOM 6/>YRS CPAP 4/> PARM: CPT

## 2019-09-19 NOTE — PROGRESS NOTES
Sleep Study Documentation    Pre-Sleep Study       Sleep testing procedure explained to patient:YES    Patient napped prior to study:NO    Caffeine:Dayshift worker after 12PM   Caffeine use:YES- soda  12 to 26 ounces    Alcohol:Alcohol use:NO    Typical day for patient:YES       Study Documentation    Sleep Study Indications: severe rosalinda, morbid obesity, severe snoring     Sleep Study: Treatment   Optimal PAP pressure: 23/19  Leak:Medium  Snore:Eliminated  REM Obtained:yes  Supplemental O2: no    Minimum SaO2 69%  Baseline SaO2 98%  PAP mask tried (list all)resmed airfit n20 and resmed airfit f 20  PAP mask choice (finalresmed airfit f 20   PAP mask type:full face  PAP pressure at which snoring was eliminated 12  Minimum SaO2 at final PAP pressure 90%  Mode of Therapy:CPAP  ETCO2:No  CPAP changed to BiPAP:Yes  If yes why pt comfort at higher pressures    Mode of Therapy:BiPAP    EKG abnormalities: no     EEG abnormalities: no    Sleep Study Recorded < 2 hours: N/A    Sleep Study Recorded > 2 hours but incomplete study: N/A    Sleep Study Recorded 6 hours but no sleep obtained: NO    Patient classification: unemployed       Post-Sleep Study    Medication used at bedtime or during sleep study:NO    Patient reports time it took to fall asleep:less than 20 minutes    Patient reports waking up during study:Denied actually unsure pt was fuzzy in am    Patient reports sleeping 4 to 6 hours without dreaming  Uncertain  Of actual time    Patient reports sleep during study:better than usual    Patient rated sleepiness: Somewhat sleepy or tired    PAP treatment:yes: Post PAP treatment patient reports feeling better and  would wear PAP mask at home

## 2019-09-24 ENCOUNTER — TELEPHONE (OUTPATIENT)
Dept: SLEEP CENTER | Facility: CLINIC | Age: 29
End: 2019-09-24

## 2019-09-24 DIAGNOSIS — G47.33 OSA (OBSTRUCTIVE SLEEP APNEA): Primary | ICD-10-CM

## 2019-10-09 ENCOUNTER — TELEPHONE (OUTPATIENT)
Dept: SLEEP CENTER | Facility: CLINIC | Age: 29
End: 2019-10-09

## 2019-11-20 ENCOUNTER — TELEPHONE (OUTPATIENT)
Dept: NEUROLOGY | Facility: CLINIC | Age: 29
End: 2019-11-20

## 2019-11-20 NOTE — TELEPHONE ENCOUNTER
11/20/2019 LM ON SHANDA'S VOICEMAIL AT PCP OFFICE REQ [employer] REF FROM PCP    [employer] REF GABRIEL UNTL 2/23/2020 (1/3 VISITS)

## 2019-12-04 ENCOUNTER — OFFICE VISIT (OUTPATIENT)
Dept: SLEEP CENTER | Facility: CLINIC | Age: 29
End: 2019-12-04
Payer: COMMERCIAL

## 2019-12-04 VITALS
DIASTOLIC BLOOD PRESSURE: 78 MMHG | SYSTOLIC BLOOD PRESSURE: 132 MMHG | HEART RATE: 82 BPM | BODY MASS INDEX: 42.44 KG/M2 | WEIGHT: 280 LBS | HEIGHT: 68 IN

## 2019-12-04 DIAGNOSIS — G47.33 OSA (OBSTRUCTIVE SLEEP APNEA): Primary | ICD-10-CM

## 2019-12-04 PROCEDURE — 99203 OFFICE O/P NEW LOW 30 MIN: CPT | Performed by: INTERNAL MEDICINE

## 2019-12-04 NOTE — PROGRESS NOTES
Consultation - [address] [place] [address] : 1990  MRN: [id number]      Assessment:  The patient has very severe obstructive sleep apnea which was effectively treated with BiPAP 23/19 cm  He has been using his device adequately, although he does report that when he awakens his mask is sometimes off  He also complains of dry mouth, but otherwise is doing well  Plan:  The patient complains of high pressure a, so I have changed his setting to BPAP Auto with a minimum EPAP of 15 cm in a maximum IPAP of 23 cm  I have also discontinued the heat from his hose to reduce dryness  Follow up: One year    History of Present Illness:   34 y o male with history of intellectual limitation, overweight hand recently diagnosed severe SAGE  His AHI = 91 1 on polysomnography  He underwent a subsequent titration study which determined a best BiPAP of 23/19 cm  Since using his device, he is less drowsy and sleeping better  The patient reports that he has been more active and has lost approximately 40 lb  He still snores while using his BiPAP  He gets adequate sleep        Review of Systems      Genitourinary none   Cardiology none   Gastrointestinal none   Neurology frequent headaches, forgetfulness, poor concentration or confusion,  and difficulty with memory   Constitutional claustrophobia and weight change   Integumentary none   Psychiatry none   Musculoskeletal joint pain, back pain and legs twitching/jerking   Pulmonary snoring   ENT none   Endocrine excessive thirst   Hematological none         I have reviewed and updated the review of systems as necessary    Historical Information    Past Medical History:  Seizure disorder    Family History: non-contributory    Social History     Socioeconomic History    Marital status: Single     Spouse name: None    Number of children: None    Years of education: None    Highest education level: None   Occupational History    None   Social Needs    Financial resource strain: None    Food insecurity:     Worry: None     Inability: None    Transportation needs:     Medical: None     Non-medical: None   Tobacco Use    Smoking status: Current Every Day Smoker     Packs/day: 0 50     Types: Cigarettes    Smokeless tobacco: Current User   Substance and Sexual Activity    Alcohol use: No    Drug use: No    Sexual activity: None   Lifestyle    Physical activity:     Days per week: None     Minutes per session: None    Stress: None   Relationships    Social connections:     Talks on phone: None     Gets together: None     Attends Scientologist service: None     Active member of club or organization: None     Attends meetings of clubs or organizations: None     Relationship status: None    Intimate partner violence:     Fear of current or ex partner: None     Emotionally abused: None     Physically abused: None     Forced sexual activity: None   Other Topics Concern    None   Social History Narrative    Current smoker     Smoke exposure at home     Caffeine use     No illicit drug use     Does not exercise regularly     Does not use a seatbelt          Sleep Schedule: unremarkable    Snoring:  Yes    Witnessed Apnea:  No    Medications/Allergies:    Current Outpatient Medications:     LYRICA 50 MG capsule, Take 50 mg by mouth 2 (two) times a day, Disp: , Rfl: 0    VENTOLIN  (90 Base) MCG/ACT inhaler, as needed, Disp: , Rfl: 1        No notes on file                  Objective:    Vital Signs:   Vitals:    12/04/19 1500   BP: 132/78   Pulse: 82   Weight: 127 kg (280 lb)   Height: 5' 8" (1 727 m)     Neck Circumference: 17      [place] Sleepiness Scale:  Total score: 16    Physical Exam:    General: Alert, appropriate, cooperative, severely overweight    Head: NC/AT, moderate retrognathia    Nose: No septal deviation, nares partially obstructed, mucosa normal    Throat: Airway diminished, tongue base slightly thickened, no tonsils visualized    Neck: Normal, no thyromegaly or lymphadenopathy, no JVD    Heart: RR, normal S1 and S2, no murmurs    Chest: Clear bilaterally    Extremity: No clubbing, cyanosis, no edema    Skin: Warm, dry    Neuro: No motor abnormalities, cranial nerves appear intact    Sleep Study Results:   AHI = 91 1  PAP Pressure: Auto PAP: Maximum IPAP 23 cm minimum EPAP 15 cm with pressure support 4 to 8 cm  DME Provider: [facility]    Counseling / Coordination of Care  A description of the counseling / coordination of care: We discussed factors which prevent him from using his device  Overall he is doing very well      Board Certified Sleep Specialist    Portions of the record may have been created with voice recognition software  Occasional wrong word or "sound a like" substitutions may have occurred due to the inherent limitations of voice recognition software  Read the chart carefully and recognize, using context, where substitutions have occurred

## 2019-12-05 ENCOUNTER — TELEPHONE (OUTPATIENT)
Dept: SLEEP CENTER | Facility: CLINIC | Age: 29
End: 2019-12-05

## 2019-12-05 NOTE — TELEPHONE ENCOUNTER
Received an RX for a pressure change  Changed accordingly  Patient's now set to an Auto Bipap 23/15 MinsPS 4/MaxPS 8   Called patient and left message

## 2019-12-26 ENCOUNTER — TRANSCRIBE ORDERS (OUTPATIENT)
Dept: INTERNAL MEDICINE CLINIC | Facility: CLINIC | Age: 29
End: 2019-12-26

## 2019-12-26 ENCOUNTER — HOSPITAL ENCOUNTER (EMERGENCY)
Facility: HOSPITAL | Age: 29
Discharge: HOME | End: 2019-12-26
Attending: EMERGENCY MEDICINE
Payer: COMMERCIAL

## 2019-12-26 VITALS
TEMPERATURE: 97.4 F | BODY MASS INDEX: 42.12 KG/M2 | HEART RATE: 91 BPM | WEIGHT: 277 LBS | RESPIRATION RATE: 18 BRPM | DIASTOLIC BLOOD PRESSURE: 95 MMHG | OXYGEN SATURATION: 97 % | SYSTOLIC BLOOD PRESSURE: 172 MMHG

## 2019-12-26 DIAGNOSIS — B35.1 ONYCHOMYCOSIS: Primary | ICD-10-CM

## 2019-12-26 DIAGNOSIS — T14.8XXA WOUND INFECTION: Primary | ICD-10-CM

## 2019-12-26 DIAGNOSIS — L03.113 CELLULITIS OF RIGHT UPPER EXTREMITY: ICD-10-CM

## 2019-12-26 DIAGNOSIS — L08.9 WOUND INFECTION: Primary | ICD-10-CM

## 2019-12-26 PROCEDURE — 99282 EMERGENCY DEPT VISIT SF MDM: CPT

## 2019-12-26 PROCEDURE — 99284 EMERGENCY DEPT VISIT MOD MDM: CPT | Performed by: EMERGENCY MEDICINE

## 2019-12-26 RX ORDER — CEPHALEXIN 500 MG/1
500 CAPSULE ORAL 4 TIMES DAILY
Qty: 28 CAPSULE | Refills: 0 | Status: SHIPPED | OUTPATIENT
Start: 2019-12-26 | End: 2020-01-02

## 2019-12-26 RX ORDER — CEPHALEXIN 250 MG/1
500 CAPSULE ORAL ONCE
Status: COMPLETED | OUTPATIENT
Start: 2019-12-26 | End: 2019-12-26

## 2019-12-26 RX ORDER — NAPROXEN 250 MG/1
250 TABLET ORAL 2 TIMES DAILY WITH MEALS
COMMUNITY

## 2019-12-26 RX ADMIN — CEPHALEXIN 500 MG: 250 CAPSULE ORAL at 18:01

## 2019-12-26 NOTE — ED ATTENDING ATTESTATION
12/26/2019  IG[place]ia MD, saw and evaluated the patient  I have discussed the patient with the resident/non-physician practitioner and agree with the resident's/non-physician practitioner's findings, Plan of Care, and MDM as documented in the resident's/non-physician practitioner's note, except where noted  All available labs and Radiology studies were reviewed  I was present for key portions of any procedure(s) performed by the resident/non-physician practitioner and I was immediately available to provide assistance  At this point I agree with the current assessment done in the Emergency Department  I have conducted an independent evaluation of this patient a history and physical is as follows: This is a 34 y o  old male who presents to the ED for evaluation of wrist pain  Redness, swelling around R wrist   Started as possible burn  Last tetanus 2 hours ago  Surrounding redness and erythema, no drainage or discharge  On exam there is redness and erythema around the medial right wrist   No fluctuance  No decreased range of motion  Normal cap refill, pulses, strength and sensation intact in radial and, ulnar median nerve distribution  Otherwise benign exam     A/P: This is a 34 y o  male who presents to the ED for evaluation of wound infection  Cellulitis  ABX  48h wound check      ED Course         Critical Care Time  Procedures

## 2019-12-26 NOTE — DISCHARGE INSTRUCTIONS
Follow-up with your primary care doctor or return to the emergency department in 48 hours for a wound recheck  Take the antibiotics as prescribed 4 times per day

## 2019-12-26 NOTE — ED PROVIDER NOTES
History  Chief Complaint   Patient presents with    Wound Infection     pt reports he fell asleep with his R hand on a heater a few nights ago and now it is an open wound, painful and red around area  28-year-old male with known mental /learning disability presenting for evaluation of right wrist wound  Patient states that 5 days ago he fell asleep with his arm against a radiator or heat source on the floor woke up and had a significant wound to it  Since then for the past 3 days he had had a worsening redness spreading from the site itself, does not have any difficulty with range of motion does not have any streaking up the arm or into his hand  He denies any other associated symptoms but does note and different ulcer closer to his elbow  Patient offers no complaints of fevers chills or any other symptoms  History provided by:  Patient   used: No    Rash   Location:  Shoulder/arm  Shoulder/arm rash location:  R wrist  Quality: painful and redness    Quality: not swelling and not weeping    Pain details:     Severity:  Mild    Onset quality:  Sudden    Timing:  Constant    Progression:  Worsening  Severity:  Mild  Onset quality:  Sudden  Timing:  Constant  Chronicity:  New  Relieved by:  Nothing  Worsened by:  Nothing  Ineffective treatments:  None tried  Associated symptoms: no abdominal pain, no fatigue, no fever, no headaches, no joint pain, no shortness of breath and no sore throat        Prior to Admission Medications   Prescriptions Last Dose Informant Patient Reported? Taking?    LYRICA 50 MG capsule 12/26/2019 at Unknown time  Yes Yes   Sig: Take 50 mg by mouth 2 (two) times a day   VENTOLIN  (90 Base) MCG/ACT inhaler Past Month at Unknown time  Yes Yes   Sig: as needed   naproxen (NAPROSYN) 250 mg tablet 12/25/2019 at Unknown time Self Yes Yes   Sig: Take 250 mg by mouth 2 (two) times a day with meals      Facility-Administered Medications: None       Past Medical History:   Diagnosis Date    Asthma     Depression     Seizure [facility])        History reviewed  No pertinent surgical history  Family History   Problem Relation Age of Onset    Mental illness Mother     Mental illness Sister      I have reviewed and agree with the history as documented  Social History     Tobacco Use    Smoking status: Current Every Day Smoker     Packs/day: 0 50     Types: Cigarettes    Smokeless tobacco: Current User   Substance Use Topics    Alcohol use: No    Drug use: No        Review of Systems   Constitutional: Negative for chills, fatigue and fever  HENT: Negative for sore throat  Eyes: Negative for visual disturbance  Respiratory: Negative for shortness of breath  Cardiovascular: Negative for chest pain  Gastrointestinal: Negative for abdominal pain  Genitourinary: Negative for difficulty urinating, dysuria and hematuria  Musculoskeletal: Negative for arthralgias  Skin: Positive for rash and wound  Neurological: Negative for syncope, weakness and headaches  Hematological: Negative for adenopathy  Psychiatric/Behavioral: Negative for agitation and behavioral problems  All other systems reviewed and are negative  Physical Exam  ED Triage Vitals   Temperature Pulse Respirations Blood Pressure SpO2   12/26/19 1630 12/26/19 1632 12/26/19 1632 12/26/19 1632 12/26/19 1632   (!) 97 4 °F (36 3 °C) 91 18 (!) 172/95 97 %      Temp src Heart Rate Source Patient Position - Orthostatic VS BP Location FiO2 (%)   -- -- -- -- --             Pain Score       12/26/19 1632       7             Orthostatic Vital Signs  Vitals:    12/26/19 1632   BP: (!) 172/95   Pulse: 91       Physical Exam   Constitutional: He is oriented to person, place, and time  He appears well-developed and well-nourished  HENT:   Head: Normocephalic and atraumatic  Eyes: Conjunctivae and EOM are normal  No scleral icterus  Neck: Normal range of motion  Neck supple     Cardiovascular: Normal rate and regular rhythm  No murmur heard  Pulmonary/Chest: Effort normal and breath sounds normal    Abdominal: Soft  Bowel sounds are normal  There is no tenderness  Musculoskeletal: Normal range of motion  Arms:  Neurological: He is alert and oriented to person, place, and time  Skin: Skin is warm and dry  Psychiatric: He has a normal mood and affect  His behavior is normal    Nursing note and vitals reviewed  ED Medications  Medications   cephalexin (KEFLEX) capsule 500 mg (500 mg Oral Given 12/26/19 1801)       Diagnostic Studies  Results Reviewed     None                 No orders to display         Procedures  Procedures      ED Course                               MDM  Number of Diagnoses or Management Options  Cellulitis of right upper extremity: new and requires workup  Wound infection: new and requires workup  Diagnosis management comments: [age]-year-old male presenting with right arm wound and cellulitis, will treat with Keflex for 7 days and have patient follow up with primary care in 2 days or return to the emergency department for evaluation in 2 days  Amount and/or Complexity of Data Reviewed  Review and summarize past medical records: yes          Disposition  Final diagnoses:   Wound infection   Cellulitis of right upper extremity     Time reflects when diagnosis was documented in both MDM as applicable and the Disposition within this note     Time User Action Codes Description Comment    12/26/2019  6:05 PM Doy Camera Add [T14  8XXA,  L08 9] Wound infection     12/26/2019  6:05 PM Doy Camera Add [P44 652] Cellulitis of right upper extremity       ED Disposition     ED Disposition Condition Date/Time Comment    Discharge Stable u Dec 26, 2019  6:05 PM Pietro Lange discharge to home/self care              Follow-up Information     Follow up With Specialties Details Why Contact Info RICK Vance Nurse Practitioner Schedule an appointment as soon as possible for a visit in 2 days For wound re-check [address] [address] [address] Emergency Department Emergency Medicine Go in 2 days If symptoms worsen, For wound re-check [address] [phone]  [facility] ED, [address], [facility]          Discharge Medication List as of 12/26/2019  6:07 PM      START taking these medications    Details   cephalexin (KEFLEX) 500 mg capsule Take 1 capsule (500 mg total) by mouth 4 (four) times a day for 7 days, Starting Thu 12/26/2019, Until Thu 1/2/2020, Normal         CONTINUE these medications which have NOT CHANGED    Details   LYRICA 50 MG capsule Take 50 mg by mouth 2 (two) times a day, Starting Wed 4/10/2019, Historical Med      naproxen (NAPROSYN) 250 mg tablet Take 250 mg by mouth 2 (two) times a day with meals, Historical Med      VENTOLIN  (90 Base) MCG/ACT inhaler as needed, Starting Mon 3/25/2019, Historical Med           No discharge procedures on file  ED Provider  Attending physically available and evaluated Yoav Perkins  CHELA managed the patient along with the ED Attending      Electronically Signed by         Renan May MD  12/26/19 4767

## 2020-01-13 ENCOUNTER — CONSULT (OUTPATIENT)
Dept: MULTI SPECIALTY CLINIC | Facility: CLINIC | Age: 30
End: 2020-01-13

## 2020-01-13 VITALS
OXYGEN SATURATION: 96 % | WEIGHT: 282.63 LBS | DIASTOLIC BLOOD PRESSURE: 88 MMHG | TEMPERATURE: 98.5 F | HEIGHT: 68 IN | SYSTOLIC BLOOD PRESSURE: 124 MMHG | BODY MASS INDEX: 42.83 KG/M2 | HEART RATE: 84 BPM

## 2020-01-13 DIAGNOSIS — B35.1 ONYCHOMYCOSIS: ICD-10-CM

## 2020-01-13 PROCEDURE — 11720 DEBRIDE NAIL 1-5: CPT | Performed by: PODIATRIST

## 2020-01-13 PROCEDURE — 99203 OFFICE O/P NEW LOW 30 MIN: CPT | Performed by: PODIATRIST

## 2020-01-13 NOTE — PROGRESS NOTES
Podiatry Clinic Visit  Rolando Powell 34 y o  male MRN: [id number]  Encounter: [id number]    Assessment/Plan        Diagnoses and all orders for this visit:    Onychomycosis  -     Ambulatory referral to Podiatry       Plan:   Patient was seen and examined with all their questions and concerns addressed   Hallucal nails x 2 were debrided in length and thickness utilizing a nail nipper without incident to patients satisfaction   Treatment options for his dystrophic nails were dicussed and patient is electing to have his left hallucal nail removed at a later date when he has more time   Patient was educated on appropriate foot hygine   Patient was re-appointed for left nail avulsion of hallucal nail when convenient to him     - Dr Magy Palmer was available/present for entirety of patient encounter and present for all procedures  History of Present Illness     HPI: Rolando Powell is a 34 y o  male who presents complaining of elongated thick and dystrophic hallucal nails left greater than right  He complains that his nails are painful especially his left  He reports a history of partial nail avulsion and trimming to treat this problem both of which have been unsuccessful  They have no further podiatric complaints at this time  Review of Systems   Constitutional: Negative  HENT: Negative  Eyes: Negative  Respiratory: Negative  Cardiovascular: Negative  Gastrointestinal: Negative  Musculoskeletal: Negative  Skin: Elongated thick dystrophic hallucal nails b/l  Neurological: Negative  Historical Information   Past Medical History:   Diagnosis Date    Asthma     Depression     Seizure [facility])      History reviewed  No pertinent surgical history    Social History   Social History     Substance and Sexual Activity   Alcohol Use No     Social History     Substance and Sexual Activity   Drug Use No     Social History     Tobacco Use   Smoking Status Current Every Day Smoker    Packs/day: 0 50    Types: Cigarettes   Smokeless Tobacco Current User     Family History:   Family History   Problem Relation Age of Onset    Mental illness Mother     Mental illness Sister        Meds/Allergies     (Not in a hospital admission)  Allergies   Allergen Reactions    Amoxicillin Hives    Penicillins Rash       Objective     Current Vitals:   Blood Pressure: 124/88 (01/13/20 1403)  Pulse: 84 (01/13/20 1403)  Temperature: 98 5 °F (36 9 °C) (01/13/20 1403)  Temp Source: Oral (01/13/20 1403)  Height: 5' 8" (172 7 cm) (01/13/20 1403)  Weight - Scale: 128 kg (282 lb 10 1 oz) (01/13/20 1403)  SpO2: 96 % (01/13/20 1403)        /88 (BP Location: Left arm, Patient Position: Sitting, Cuff Size: Adult)   Pulse 84   Temp 98 5 °F (36 9 °C) (Oral)   Ht 5' 8" (1 727 m)   Wt 128 kg (282 lb 10 1 oz)   SpO2 96%   BMI 42 97 kg/m²       Lower Extremity Exam:    Foot Exam    Musculoskeletal:  MMT is 5/5 to all compartments of the LE, +0/4 edema B/L, Hallux limitus is not present  Equinus is present  Pain on palpation of the hallucal nail b/l  Vascular:   DP pulses and PT pulses are present bilaterally  , CFT< 3sec to all digits  Pedal hair is Present  regular rate and rhythm  Dermatological:  No open Lesions  Skin of the LE is normal texture, temperature, turgor  Interdigital maceration is not present  Elongated nails x 10  Thick, discolored, and elongated nails of the hallux which are positive for subungual debris  Neurologic:  Gross sensation is intact  Protective sensation is Intact  Vibratory sensation is Intact  Sharp sensation is present

## 2020-01-28 ENCOUNTER — OFFICE VISIT (OUTPATIENT)
Dept: MULTI SPECIALTY CLINIC | Facility: CLINIC | Age: 30
End: 2020-01-28

## 2020-01-28 VITALS
HEIGHT: 68 IN | DIASTOLIC BLOOD PRESSURE: 88 MMHG | TEMPERATURE: 98.4 F | SYSTOLIC BLOOD PRESSURE: 138 MMHG | BODY MASS INDEX: 42.9 KG/M2 | HEART RATE: 87 BPM | WEIGHT: 283.07 LBS | OXYGEN SATURATION: 97 %

## 2020-01-28 DIAGNOSIS — M67.88 LEFT PERONEAL TENDINOSIS: ICD-10-CM

## 2020-01-28 DIAGNOSIS — L60.8 PINCER NAIL DEFORMITY: Primary | ICD-10-CM

## 2020-01-28 PROCEDURE — 99212 OFFICE O/P EST SF 10 MIN: CPT | Performed by: PODIATRIST

## 2020-01-28 NOTE — PROGRESS NOTES
Assessment/Plan:    - attempted to perform total nail avulsion of left hallux however upon starting the procedure patient became nervous and had panic attack thus had to stop the procedure  Patient had local hallux block given with 5cc of 1% lidocaine plain  He Stated there was no pain but pressure was causing him discomfort  - patient will follow up for this later in a month  - For left peroneal tendinosis recommended to wear supportive shoe gear such as newbalance sneaker, pt states he is going through financial issues currently and cant not afford it at the moment  He would like to address this issue next visit with possible PT referral   - PRN f/u       Diagnoses and all orders for this visit:    Pincer nail deformity    Left peroneal tendinosis          Subjective:      Patient ID: Hollis Pagan is a 34 y o  male  Presents with left pince nail deformity  Patient states his nail cause him lot pain and wants to have total nail avulsion done today  Denies any other complaints today  HPI    The following portions of the patient's history were reviewed and updated as appropriate: allergies, current medications, past family history, past medical history, past social history, past surgical history and problem list     Review of Systems   Constitutional: Negative  HENT: Negative  Respiratory: Negative  Gastrointestinal: Negative  Musculoskeletal:        Left foot pain   Neurological: Negative  Objective:      /88 (BP Location: Right arm, Patient Position: Sitting, Cuff Size: Adult)   Pulse 87   Temp 98 4 °F (36 9 °C) (Temporal)   Ht 5' 8" (1 727 m)   Wt 128 kg (283 lb 1 1 oz)   SpO2 97%   BMI 43 04 kg/m²          Physical Exam   Cardiovascular:   Pulses:       Dorsalis pedis pulses are 2+ on the right side, and 2+ on the left side  Posterior tibial pulses are 2+ on the right side, and 2+ on the left side     Musculoskeletal:        Right foot: There is normal range of motion  Left foot: There is normal range of motion  Supinated foot type  Pain upon palpation on the lateral ankle along the peroneal tendon (likely brevis)  Pain upon eversion and inversion  Feet:   Right Foot:   Protective Sensation: 10 sites tested  10 sites sensed  Skin Integrity: Positive for dry skin  Negative for ulcer, skin breakdown or erythema  Left Foot:   Protective Sensation: 10 sites tested  10 sites sensed  Skin Integrity: Positive for dry skin  Negative for ulcer, skin breakdown or erythema  Skin: Skin is warm, dry and intact  Capillary refill takes less than 2 seconds  Left hallux  pincer nail deformity noted  Nail is growing into the skin, pain upon palpation  Nail plate is thick with subungual debris noted

## 2020-01-31 ENCOUNTER — TELEPHONE (OUTPATIENT)
Dept: INTERNAL MEDICINE CLINIC | Facility: CLINIC | Age: 30
End: 2020-01-31

## 2020-02-17 ENCOUNTER — TELEPHONE (OUTPATIENT)
Dept: INTERNAL MEDICINE CLINIC | Facility: CLINIC | Age: 30
End: 2020-02-17

## 2020-03-02 ENCOUNTER — TELEPHONE (OUTPATIENT)
Dept: NEUROLOGY | Facility: CLINIC | Age: 30
End: 2020-03-02

## 2020-03-02 NOTE — TELEPHONE ENCOUNTER
Patient states he no showed his MRI due to not feeling well plus other issues going on    Called Central Scheduling and patient scheduled for MRI on 3 19 2020 @ 7:30 pm

## 2020-03-02 NOTE — TELEPHONE ENCOUNTER
----- Message from Ekaterina Seo MD sent at 2/28/2020  5:16 PM EST -----  Please inquire about why patient missed MRI and see if he can get it rescheduled  ----- Message -----  From: Rocio Mathew  Sent: 5/92/7542   3:57 PM EST  To: Ekaterina Seo MD    This is to notify you that your patient did not show for the MRI study  Any question please call us at [phone]  Thank you      Rocio Mathew  Radiology   [address]

## 2020-07-08 ENCOUNTER — TELEPHONE (OUTPATIENT)
Dept: NEUROLOGY | Facility: CLINIC | Age: 30
End: 2020-07-08

## 2020-07-08 NOTE — TELEPHONE ENCOUNTER
LMOM  Patient has appointment today 7/8/ @ 11:05 with Dr Joellen Johnson, however the MRI Dr Joellen Johnson had ordered 8/28/2019 was not completed  Dr Joellen Johnson would like to see this patient AFTER the MRI is completed  Left detailed message for patient to contact office to reschedule todays appointment for after the MRI is scheduled

## 2020-07-08 NOTE — TELEPHONE ENCOUNTER
Pt called and advised of the below  He verbalized understanding  OVS appt rescheduled 20 at 11:10  Brain MRI PA  in April  Pls initiate PA Pre cert dept  Brain MRI scheduled 20 at 5 pm      Analia Almanza, I don't see open slot (25 min) in Aug, so I scheduled ovs appt on 20         thanks

## 2020-07-15 ENCOUNTER — TRANSCRIBE ORDERS (OUTPATIENT)
Dept: RADIOLOGY | Facility: HOSPITAL | Age: 30
End: 2020-07-15

## 2020-07-15 ENCOUNTER — TELEPHONE (OUTPATIENT)
Dept: PSYCHIATRY | Facility: CLINIC | Age: 30
End: 2020-07-15

## 2020-08-31 ENCOUNTER — TELEPHONE (OUTPATIENT)
Dept: NEUROLOGY | Facility: CLINIC | Age: 30
End: 2020-08-31

## 2021-03-23 ENCOUNTER — HOSPITAL ENCOUNTER (EMERGENCY)
Facility: HOSPITAL | Age: 31
Discharge: HOME | End: 2021-03-23
Attending: EMERGENCY MEDICINE | Admitting: EMERGENCY MEDICINE
Payer: COMMERCIAL

## 2021-03-23 VITALS
SYSTOLIC BLOOD PRESSURE: 92 MMHG | BODY MASS INDEX: 42.88 KG/M2 | WEIGHT: 282 LBS | DIASTOLIC BLOOD PRESSURE: 71 MMHG | TEMPERATURE: 98.8 F | RESPIRATION RATE: 18 BRPM | OXYGEN SATURATION: 99 % | HEART RATE: 86 BPM

## 2021-03-23 DIAGNOSIS — M79.662 PAIN OF LEFT CALF: Primary | ICD-10-CM

## 2021-03-23 PROCEDURE — 99284 EMERGENCY DEPT VISIT MOD MDM: CPT

## 2021-03-23 PROCEDURE — 99282 EMERGENCY DEPT VISIT SF MDM: CPT | Performed by: EMERGENCY MEDICINE

## 2021-03-23 NOTE — ED PROVIDER NOTES
History  Chief Complaint   Patient presents with    Leg Pain     Pt reports having L posterior thigh pain that has been going on for 3 months  Pt rpeorts his Dr  told him to go to the ER and he never followed up  31year old males, arrives via EMS for leg pain and cramping in left calf  Patient states he has history of left knee pain, has seen "specialist" for injections for pain management about 1 month ago  Began having pain around knee again after injection 3 weeks ago and was concerned for DVT  Went back to specialist and was referred to the ED at that time  Patient states he never followed through with ER visit  Symptoms worsened while walking around tonight  Denies being on anticoagulants or previous hx of DVT  History provided by:  Patient   used: No    Leg Pain  Associated symptoms: no fever and no neck pain        Prior to Admission Medications   Prescriptions Last Dose Informant Patient Reported? Taking? LYRICA 50 MG capsule Not Taking at Unknown time  Yes No   Sig: Take 50 mg by mouth 2 (two) times a day   VENTOLIN  (90 Base) MCG/ACT inhaler   Yes No   Sig: as needed   naproxen (NAPROSYN) 250 mg tablet Not Taking at Unknown time Self Yes No   Sig: Take 250 mg by mouth 2 (two) times a day with meals      Facility-Administered Medications: None       Past Medical History:   Diagnosis Date    Asthma     Depression     Seizure ([facility] [address] )        No past surgical history on file  Family History   Problem Relation Age of Onset    Mental illness Mother     Mental illness Sister      I have reviewed and agree with the history as documented  E-Cigarette/Vaping     E-Cigarette/Vaping Substances     Social History     Tobacco Use    Smoking status: Current Every Day Smoker     Packs/day: 0 50     Types: Cigarettes    Smokeless tobacco: Current User   Substance Use Topics    Alcohol use: No    Drug use: No       Review of Systems   Constitutional: Negative    Negative for chills and fever  HENT: Negative  Negative for rhinorrhea, sore throat, trouble swallowing and voice change  Eyes: Negative  Negative for pain and visual disturbance  Respiratory: Negative  Negative for cough, shortness of breath and wheezing  Cardiovascular: Negative  Negative for chest pain and palpitations  Gastrointestinal: Negative for abdominal pain, diarrhea, nausea and vomiting  Genitourinary: Negative  Negative for dysuria and frequency  Musculoskeletal: Positive for arthralgias  Negative for neck pain and neck stiffness  Skin: Negative  Negative for rash  Neurological: Negative  Negative for dizziness, speech difficulty, weakness, light-headedness and numbness  Physical Exam  Physical Exam  Vitals signs and nursing note reviewed  Constitutional:       General: He is not in acute distress  Appearance: He is well-developed  HENT:      Head: Normocephalic and atraumatic  Eyes:      Conjunctiva/sclera: Conjunctivae normal       Pupils: Pupils are equal, round, and reactive to light  Neck:      Musculoskeletal: Normal range of motion and neck supple  Trachea: No tracheal deviation  Cardiovascular:      Rate and Rhythm: Normal rate and regular rhythm  Pulmonary:      Effort: Pulmonary effort is normal  No respiratory distress  Breath sounds: Normal breath sounds  No wheezing or rales  Abdominal:      General: Bowel sounds are normal  There is no distension  Palpations: Abdomen is soft  Tenderness: There is no abdominal tenderness  There is no guarding or rebound  Musculoskeletal: Normal range of motion  General: No deformity  Left lower leg: He exhibits tenderness  He exhibits no bony tenderness, no swelling, no deformity and no laceration  No edema  Skin:     General: Skin is warm and dry  Capillary Refill: Capillary refill takes less than 2 seconds  Findings: No rash     Neurological:      Mental Status: He is alert and oriented to person, place, and time  Psychiatric:         Behavior: Behavior normal          Vital Signs  ED Triage Vitals [03/23/21 0239]   Temperature Pulse Respirations Blood Pressure SpO2   98 8 °F (37 1 °C) 86 18 92/71 99 %      Temp Source Heart Rate Source Patient Position - Orthostatic VS BP Location FiO2 (%)   Tympanic Monitor Sitting Left arm --      Pain Score       6           Vitals:    03/23/21 0239   BP: 92/71   Pulse: 86   Patient Position - Orthostatic VS: Sitting         Visual Acuity      ED Medications  Medications - No data to display    Diagnostic Studies  Results Reviewed     Procedure Component Value Units Date/Time    CBC and differential [[id number]]     Lab Status: No result Specimen: Blood     Comprehensive metabolic panel [[id number]]     Lab Status: No result Specimen: Blood                  VAS lower limb venous duplex study, unilateral/limited    (Results Pending)              Procedures  Procedures         ED Course                                           MDM  Number of Diagnoses or Management Options  Pain of left calf:   Diagnosis management comments: Patient is a 32-year-old male who presented for concerns of left calf pain has been present for several weeks  He has been told to be evaluated for DVT in the emergency room starting approximately 3 weeks ago  He arrived by EMS [facility] for similar concerns  On evaluation he did have left calf tenderness  Has mild warmth to his right leg  I reviewed testing plan for the patient which included basic blood work, evaluation for kidney function and likely Lovenox shots that he could then receive a ultrasound within next 24 hours  Upon hearing this plan, patient stated that they would need a ride to a local hotel where they were staying, patient was advised that due to the coronavirus restrictions rides could no longer be occur for patient's from this emergency room    At that point patient declined have any further treatment including lab work or anticoagulation  He was advised the risks leaving and elected to sign out against medical advice  Patient was advised to return any time for any reason for further care  Amount and/or Complexity of Data Reviewed  Clinical lab tests: ordered  Tests in the radiology section of CPT®: ordered        Disposition  Final diagnoses:   Pain of left calf - r/o dvt     Time reflects when diagnosis was documented in both MDM as applicable and the Disposition within this note     Time User Action Codes Description Comment    3/23/2021  2:48 AM Garret Hines Add [M79 662] Pain of left calf     3/23/2021  2:48 AM Garret Hines Modify [Y04 225] Pain of left calf r/o dvt      ED Disposition     ED Disposition Condition Date/Time Comment    DEBBIE Valdez Mar 23, 2021  2:51 AM Date: 3/23/2021  Patient: Yahaira Davidson  Admitted: 3/23/2021  2:35 AM  Attending Provider: Ria Bowman,     Yahaira Davidson or his authorized caregiver has made the decision for the patient to leave the emergency department against the advice o f his attending physician  He or his authorized caregiver has been informed and understands the inherent risks, including death, loss of limb  He or his authorized caregiver has decided to accept the responsibility for this decision  Yahaira lake nd all necessary parties have been advised that he may return for further evaluation or treatment  His condition at time of discharge was guarded    Yahaira Davidson had current vital signs as follows:  BP 92/71 (BP Location: Left arm)   Pulse 86   Tem p 98 8 °F (37 1 °C) (Tympanic)   Resp 18   Wt 128 kg (282 lb)         Follow-up Information     Follow up With Specialties Details Why [address], RICK Nurse Practitioner   [address] [facility]  [phone]            Discharge Medication List as of 3/23/2021  2:51 AM      CONTINUE these medications which have NOT CHANGED Details   LYRICA 50 MG capsule Take 50 mg by mouth 2 (two) times a day, Starting Wed 4/10/2019, Historical Med      naproxen (NAPROSYN) 250 mg tablet Take 250 mg by mouth 2 (two) times a day with meals, Historical Med      VENTOLIN  (90 Base) MCG/ACT inhaler as needed, Starting Mon 3/25/2019, Historical Med           Outpatient Discharge Orders   VAS lower limb venous duplex study, unilateral/limited   Standing Status: Future Standing Exp   Date: 03/23/25       PDMP Review     None          ED Provider  Electronically Signed by           Tomy Zuñiga DO  03/23/21 4770

## 2021-06-14 NOTE — TELEPHONE ENCOUNTER
LMOM to come in 10 min sooner to appt    Tues 9/1 [address] moved from 1110 to 1100 Introduction Text (Please End With A Colon): The following procedure was deferred:return for excision Detail Level: Detailed

## 2022-02-01 ENCOUNTER — HOSPITAL ENCOUNTER (EMERGENCY)
Facility: HOSPITAL | Age: 32
Discharge: HOME | End: 2022-02-01
Attending: EMERGENCY MEDICINE
Payer: MEDICARE

## 2022-02-01 VITALS
SYSTOLIC BLOOD PRESSURE: 170 MMHG | TEMPERATURE: 98 F | DIASTOLIC BLOOD PRESSURE: 95 MMHG | RESPIRATION RATE: 18 BRPM | HEART RATE: 98 BPM | OXYGEN SATURATION: 97 %

## 2022-02-01 DIAGNOSIS — R05.9 COUGH: ICD-10-CM

## 2022-02-01 DIAGNOSIS — R43.2 DYSGEUSIA: Primary | ICD-10-CM

## 2022-02-01 PROCEDURE — U0005 INFEC AGEN DETEC AMPLI PROBE: HCPCS

## 2022-02-01 PROCEDURE — 99284 EMERGENCY DEPT VISIT MOD MDM: CPT | Performed by: EMERGENCY MEDICINE

## 2022-02-01 PROCEDURE — U0003 INFECTIOUS AGENT DETECTION BY NUCLEIC ACID (DNA OR RNA); SEVERE ACUTE RESPIRATORY SYNDROME CORONAVIRUS 2 (SARS-COV-2) (CORONAVIRUS DISEASE [COVID-19]), AMPLIFIED PROBE TECHNIQUE, MAKING USE OF HIGH THROUGHPUT TECHNOLOGIES AS DESCRIBED BY CMS-2020-01-R: HCPCS

## 2022-02-01 PROCEDURE — 99282 EMERGENCY DEPT VISIT SF MDM: CPT

## 2022-02-01 NOTE — ED ATTENDING ATTESTATION
2/1/2022  ITalat DO, saw and evaluated the patient  I have discussed the patient with the resident/non-physician practitioner and agree with the resident's/non-physician practitioner's findings, Plan of Care, and MDM as documented in the resident's/non-physician practitioner's note, except where noted  All available labs and Radiology studies were reviewed  I was present for key portions of any procedure(s) performed by the resident/non-physician practitioner and I was immediately available to provide assistance  At this point I agree with the current assessment done in the Emergency Department  I have conducted an independent evaluation of this patient a history and physical is as follows:    33-year-old male presents requesting COVID screen  Patient states his girlfriend has symptoms and he is concerned his event together  He denies shortness of breath, no fevers, no chest pain or abdominal pain  Is having some difficulty with taste and smell  On exam-no acute distress, heart regular, no respiratory distress, appears nontoxic    Plan-COVID test, COVID precautions    ED Course         Critical Care Time  Procedures

## 2022-02-01 NOTE — DISCHARGE INSTRUCTIONS
Take tylenol for fevers and body aches  Stay well-hydrated  Return to ER if you have shortness of breath or chest pain  We will call you with your COVID results

## 2022-02-01 NOTE — ED PROVIDER NOTES
History  Chief Complaint   Patient presents with    COVID-19 Swab Only     gf had it and we are trying to get back together so i think i have it     35 yo healthy M presents w/ non-productive cough and loss of taste since yesterday  + COVID contacts  Denies dyspnea, chest pain, N/V/D, or myalgias  Prior to Admission Medications   Prescriptions Last Dose Informant Patient Reported? Taking? LYRICA 50 MG capsule   Yes No   Sig: Take 50 mg by mouth 2 (two) times a day   VENTOLIN  (90 Base) MCG/ACT inhaler   Yes No   Sig: as needed   naproxen (NAPROSYN) 250 mg tablet  Self Yes No   Sig: Take 250 mg by mouth 2 (two) times a day with meals      Facility-Administered Medications: None       Past Medical History:   Diagnosis Date    Asthma     Depression     Seizure ([facility] [address] )        No past surgical history on file  Family History   Problem Relation Age of Onset    Mental illness Mother     Mental illness Sister      I have reviewed and agree with the history as documented  E-Cigarette/Vaping     E-Cigarette/Vaping Substances     Social History     Tobacco Use    Smoking status: Current Every Day Smoker     Packs/day: 0 50     Types: Cigarettes    Smokeless tobacco: Current User   Substance Use Topics    Alcohol use: No    Drug use: No        Review of Systems   All other systems reviewed and are negative  Physical Exam  ED Triage Vitals [02/01/22 1406]   Temperature Pulse Respirations Blood Pressure SpO2   98 °F (36 7 °C) (!) 110 18 (!) 182/90 98 %      Temp Source Heart Rate Source Patient Position - Orthostatic VS BP Location FiO2 (%)   Temporal Monitor Sitting Left arm --      Pain Score       No Pain             Orthostatic Vital Signs  Vitals:    02/01/22 1406 02/01/22 1514   BP: (!) 182/90 170/95   Pulse: (!) 110 98   Patient Position - Orthostatic VS: Sitting        Physical Exam  Vitals and nursing note reviewed  Constitutional:       General: He is not in acute distress  Appearance: He is well-developed  He is obese  He is not ill-appearing, toxic-appearing or diaphoretic  HENT:      Head: Normocephalic and atraumatic  Right Ear: External ear normal       Left Ear: External ear normal       Nose: Nose normal  No congestion  Mouth/Throat:      Mouth: Mucous membranes are moist       Pharynx: Oropharynx is clear  Eyes:      Conjunctiva/sclera: Conjunctivae normal       Pupils: Pupils are equal, round, and reactive to light  Cardiovascular:      Rate and Rhythm: Normal rate and regular rhythm  Pulses: Normal pulses  Heart sounds: Normal heart sounds  No murmur heard  Pulmonary:      Effort: Pulmonary effort is normal  No respiratory distress  Breath sounds: Normal breath sounds  No wheezing  Abdominal:      General: Bowel sounds are normal       Palpations: Abdomen is soft  Tenderness: There is no abdominal tenderness  Musculoskeletal:         General: Normal range of motion  Cervical back: Normal range of motion  Skin:     General: Skin is warm and dry  Capillary Refill: Capillary refill takes less than 2 seconds  Neurological:      Mental Status: He is alert and oriented to person, place, and time  Psychiatric:         Mood and Affect: Mood normal          Behavior: Behavior normal          ED Medications  Medications - No data to display    Diagnostic Studies  Results Reviewed     Procedure Component Value Units Date/Time    COVID only - 48 hour TAT [[id number]] Collected: 02/01/22 1435    Lab Status:  In process Specimen: Nares from Nose Updated: 02/01/22 1441                 No orders to display         Procedures  Procedures      ED Course                                       MDM  Number of Diagnoses or Management Options  Cough: new and does not require workup  Dysgeusia: new and does not require workup  Diagnosis management comments: Impression: well-appearing M here for COVID swab       Amount and/or Complexity of Data Reviewed  Clinical lab tests: ordered    Risk of Complications, Morbidity, and/or Mortality  Presenting problems: low  Diagnostic procedures: minimal  Management options: minimal    Patient Progress  Patient progress: stable      Disposition  Final diagnoses:   Dysgeusia   Cough     Time reflects when diagnosis was documented in both MDM as applicable and the Disposition within this note     Time User Action Codes Description Comment    2/1/2022  3:10 PM Dorma Reil Add [R43 2] Dysgeusia     2/1/2022  3:10 PM Dorma Reil Add [R05 9] Cough       ED Disposition     ED Disposition Condition Date/Time Comment    Discharge Stable Tue Feb 1, 2022  3:10 PM Jeff Cassidy discharge to home/self care  Follow-up Information     Follow up With Specialties Details Why [address], [place] Nurse Practitioner Call in 3 days If symptoms worsen Lucia [address] [facility]  [phone]            Discharge Medication List as of 2/1/2022  3:24 PM      CONTINUE these medications which have NOT CHANGED    Details   LYRICA 50 MG capsule Take 50 mg by mouth 2 (two) times a day, Starting Wed 4/10/2019, Historical Med      naproxen (NAPROSYN) 250 mg tablet Take 250 mg by mouth 2 (two) times a day with meals, Historical Med      VENTOLIN  (90 Base) MCG/ACT inhaler as needed, Starting Mon 3/25/2019, Historical Med           No discharge procedures on file  PDMP Review     None           ED Provider  Attending physically available and evaluated Jeff Cassidy I managed the patient along with the ED Attending      Electronically Signed by         Fred Kawasaki, MD  02/01/22 4414

## 2022-02-02 LAB — SARS-COV-2 RNA RESP QL NAA+PROBE: POSITIVE

## 2022-02-03 NOTE — RESULT ENCOUNTER NOTE
Attempted to call patient with positive COVID-19 test results  Unable to complete call  attempted to call emergency contact but no answer at this number

## 2023-09-27 ENCOUNTER — HOSPITAL ENCOUNTER (EMERGENCY)
Facility: HOSPITAL | Age: 33
Discharge: HOME | End: 2023-09-27
Attending: EMERGENCY MEDICINE | Admitting: EMERGENCY MEDICINE
Payer: COMMERCIAL

## 2023-09-27 ENCOUNTER — APPOINTMENT (EMERGENCY)
Dept: RADIOLOGY | Facility: HOSPITAL | Age: 33
End: 2023-09-27
Payer: COMMERCIAL

## 2023-09-27 VITALS
SYSTOLIC BLOOD PRESSURE: 162 MMHG | RESPIRATION RATE: 20 BRPM | OXYGEN SATURATION: 94 % | WEIGHT: 309.7 LBS | TEMPERATURE: 98.2 F | HEART RATE: 104 BPM | DIASTOLIC BLOOD PRESSURE: 84 MMHG | BODY MASS INDEX: 47.09 KG/M2

## 2023-09-27 DIAGNOSIS — U07.1 COVID: Primary | ICD-10-CM

## 2023-09-27 DIAGNOSIS — B34.9 VIRAL SYNDROME: ICD-10-CM

## 2023-09-27 DIAGNOSIS — R05.1 ACUTE COUGH: ICD-10-CM

## 2023-09-27 DIAGNOSIS — R09.81 NASAL CONGESTION: ICD-10-CM

## 2023-09-27 LAB
FLUAV RNA RESP QL NAA+PROBE: NEGATIVE
FLUBV RNA RESP QL NAA+PROBE: NEGATIVE
RSV RNA RESP QL NAA+PROBE: NEGATIVE
SARS-COV-2 RNA RESP QL NAA+PROBE: POSITIVE

## 2023-09-27 PROCEDURE — 99284 EMERGENCY DEPT VISIT MOD MDM: CPT | Performed by: EMERGENCY MEDICINE

## 2023-09-27 PROCEDURE — 99284 EMERGENCY DEPT VISIT MOD MDM: CPT

## 2023-09-27 PROCEDURE — 0241U HB NFCT DS VIR RESP RNA 4 TRGT: CPT

## 2023-09-27 PROCEDURE — 96372 THER/PROPH/DIAG INJ SC/IM: CPT

## 2023-09-27 PROCEDURE — 71046 X-RAY EXAM CHEST 2 VIEWS: CPT

## 2023-09-27 RX ORDER — FLUTICASONE PROPIONATE 50 MCG
1 SPRAY, SUSPENSION (ML) NASAL DAILY
Qty: 16 G | Refills: 0 | Status: SHIPPED | OUTPATIENT
Start: 2023-09-27

## 2023-09-27 RX ORDER — KETOROLAC TROMETHAMINE 30 MG/ML
15 INJECTION, SOLUTION INTRAMUSCULAR; INTRAVENOUS ONCE
Status: COMPLETED | OUTPATIENT
Start: 2023-09-27 | End: 2023-09-27

## 2023-09-27 RX ORDER — ACETAMINOPHEN 325 MG/1
975 TABLET ORAL ONCE
Status: COMPLETED | OUTPATIENT
Start: 2023-09-27 | End: 2023-09-27

## 2023-09-27 RX ADMIN — ACETAMINOPHEN 975 MG: 325 TABLET ORAL at 22:32

## 2023-09-27 RX ADMIN — KETOROLAC TROMETHAMINE 15 MG: 30 INJECTION, SOLUTION INTRAMUSCULAR; INTRAVENOUS at 22:33

## 2023-09-27 NOTE — Clinical Note
Austen Paul was seen and treated in our emergency department on 9/27/2023. No restrictions            Diagnosis:     Genesis Tejeda  . He may return on this date: 09/30/2023         If you have any questions or concerns, please don't hesitate to call.       Bernie Munguia MD    ______________________________           _______________          _______________  Hospital Representative                              Date                                Time

## 2023-09-28 NOTE — ED ATTENDING ATTESTATION
9/27/2023  I, Lucia Oliva MD, saw and evaluated the patient. I have discussed the patient with the resident/non-physician practitioner and agree with the resident's/non-physician practitioner's findings, Plan of Care, and MDM as documented in the resident's/non-physician practitioner's note, except where noted. All available labs and Radiology studies were reviewed. I was present for key portions of any procedure(s) performed by the resident/non-physician practitioner and I was immediately available to provide assistance. At this point I agree with the current assessment done in the Emergency Department. I have conducted an independent evaluation of this patient a history and physical is as follows:    Chief Complaint   Patient presents with   • Cold Like Symptoms     A couple of days and [facility] with many other people sick        HPI: 29 yo M 2-3 days non productive cough, congestion. +sick contacts at rescue home. No meds taken at home. No fevers. Does have congestion. No n/v/cp/sob. No trouble handling oral secretions. No change in voice. Past Medical History:   Diagnosis Date   • Asthma    • Depression    • Seizure [facility])       Exam:  Lungs clear  abd non tender  Effusion in R ear, minimal erythema, non purulent    No current facility-administered medications for this encounter.     Current Outpatient Medications:   •  fluticasone (FLONASE) 50 mcg/act nasal spray, 1 spray into each nostril daily, Disp: 16 g, Rfl: 0  •  LYRICA 50 MG capsule, Take 50 mg by mouth 2 (two) times a day, Disp: , Rfl: 0  •  naproxen (NAPROSYN) 250 mg tablet, Take 250 mg by mouth 2 (two) times a day with meals, Disp: , Rfl:   •  VENTOLIN  (90 Base) MCG/ACT inhaler, as needed, Disp: , Rfl: 1     A/P:  Amount and/or Complexity of Data Reviewed  Clinical lab tests: ordered covid flu rsv strep    Differential considered covid flu rsv viral uri strep  Chest xray concern for pna    Testing sent, symptomatic treatments provided. Well appearing otherwise pcp follow up. Testing is + for covid.       ED Course         Critical Care Time  Procedures

## 2023-09-28 NOTE — DISCHARGE INSTRUCTIONS
-Cold like symptoms are self-limited and may last up to 14 days, and cough may last up to 21 days. Stay well hydrated. Wash and clean hands regularly to prevent new infection and spreading of infection. Tylenol and ibuprofen as needed for fever, and/or muscle aches. You may attempt Neti pot, cool mist humidifier, hot showers, nasal spray, or nasal saline for congestion. Diphenhydramine/Benadryl prior to bed for congestion or runny nose. Pectin based lozenges or ice chips as need for throat irritation. Honey may help relieve symptoms of sore throat, but do not give honey to an infant younger than 3year old. You may also attempt topical application of ointments containing camphor, menthol, and eucalyptus oils as needed.   -Please follow up w/ your primary care provider concerning high fevers >101, severe headaches, shortness of breath, or continued/worsening symptoms.

## 2023-09-28 NOTE — ED PROVIDER NOTES
History  Chief Complaint   Patient presents with   • Cold Like Symptoms     A couple of days and is at rescue [facility] with many other people sick       33 yo m w/ no sig pmhx presents w/ cough  Patient states that he is at rescue shelter and began to develop cough shortly after being exposed by another person with cough. 2-3 days of none productive cough, chills, nasal congestion w/ yellow green sputum. Other person was noted to have pna and he is concerned for the same today. Associated trouble hearing in the right ear and fullness. Denies other medical issues. No medications. Did not attempt anything for relief. Denies previous sx. Denies alcohol or recreational drug use. Tobacco use. Denies fevers, HA, sore throat, discharge from the ENT, neck pain, neck stiffness, SOB, CP, leg swelling, exertional dyspnea, palpitations, rash, joint pains, urinary or bowel complaints. Prior to Admission Medications   Prescriptions Last Dose Informant Patient Reported? Taking? LYRICA 50 MG capsule   Yes No   Sig: Take 50 mg by mouth 2 (two) times a day   VENTOLIN  (90 Base) MCG/ACT inhaler   Yes No   Sig: as needed   naproxen (NAPROSYN) 250 mg tablet  Self Yes No   Sig: Take 250 mg by mouth 2 (two) times a day with meals      Facility-Administered Medications: None       Past Medical History:   Diagnosis Date   • Asthma    • Depression    • Seizure ([address])        History reviewed. No pertinent surgical history. Family History   Problem Relation Age of Onset   • Mental illness Mother    • Mental illness Sister      I have reviewed and agree with the history as documented. E-Cigarette/Vaping     E-Cigarette/Vaping Substances     Social History     Tobacco Use   • Smoking status: Every Day     Packs/day: 0.50     Types: Cigarettes   • Smokeless tobacco: Current   Substance Use Topics   • Alcohol use: No   • Drug use: No        Review of Systems   All other systems reviewed and are negative.       Physical Exam  ED Triage Vitals [09/27/23 2202]   Temperature Pulse Respirations Blood Pressure SpO2   99.1 °F (37.3 °C) 104 20 162/84 94 %      Temp Source Heart Rate Source Patient Position - Orthostatic VS BP Location FiO2 (%)   Tympanic Monitor Sitting Right arm --      Pain Score       --             Orthostatic Vital Signs  Vitals:    09/27/23 2202   BP: 162/84   Pulse: 104   Patient Position - Orthostatic VS: Sitting       Physical Exam  Vitals and nursing note reviewed. Constitutional:       General: He is not in acute distress. Appearance: Normal appearance. He is obese. He is not ill-appearing, toxic-appearing or diaphoretic. HENT:      Head: Normocephalic and atraumatic. Right Ear: Ear canal and external ear normal. No swelling or tenderness. A middle ear effusion is present. There is no impacted cerumen. No hemotympanum. Tympanic membrane is not injected, scarred, perforated, erythematous, retracted or bulging. Left Ear: Tympanic membrane, ear canal and external ear normal. No swelling or tenderness. No middle ear effusion. There is no impacted cerumen. No hemotympanum. Tympanic membrane is not injected, scarred, perforated, erythematous, retracted or bulging. Nose: Congestion present. No rhinorrhea. Mouth/Throat:      Mouth: Mucous membranes are moist.      Pharynx: Oropharynx is clear. No oropharyngeal exudate or posterior oropharyngeal erythema. Eyes:      General: No scleral icterus. Right eye: No discharge. Left eye: No discharge. Extraocular Movements: Extraocular movements intact. Conjunctiva/sclera: Conjunctivae normal.      Pupils: Pupils are equal, round, and reactive to light. Cardiovascular:      Rate and Rhythm: Normal rate and regular rhythm. Pulses: Normal pulses. Heart sounds: Normal heart sounds. No murmur heard. No friction rub. No gallop. Pulmonary:      Effort: Pulmonary effort is normal. No respiratory distress. Breath sounds: Normal breath sounds. No stridor. No wheezing, rhonchi or rales. Chest:      Chest wall: No tenderness. Abdominal:      General: There is no distension. Palpations: Abdomen is soft. There is no mass. Tenderness: There is no abdominal tenderness. There is no right CVA tenderness, left CVA tenderness, guarding or rebound. Hernia: No hernia is present. Musculoskeletal:         General: No swelling, tenderness, deformity or signs of injury. Normal range of motion. Cervical back: Normal range of motion and neck supple. No rigidity or tenderness. Right lower leg: No edema. Left lower leg: No edema. Lymphadenopathy:      Cervical: No cervical adenopathy. Skin:     General: Skin is warm and dry. Capillary Refill: Capillary refill takes less than 2 seconds. Coloration: Skin is not cyanotic, jaundiced or pale. Findings: No bruising, erythema, lesion, petechiae or rash. Neurological:      General: No focal deficit present. Mental Status: He is alert and oriented to person, place, and time. Mental status is at baseline. Gait: Gait normal.   Psychiatric:         Mood and Affect: Mood normal.         ED Medications  Medications   acetaminophen (TYLENOL) tablet 975 mg (975 mg Oral Given 9/27/23 2232)   ketorolac (TORADOL) injection 15 mg (15 mg Intramuscular Given 9/27/23 2233)       Diagnostic Studies  Results Reviewed     Procedure Component Value Units Date/Time    FLU/RSV/COVID - if FLU/RSV clinically relevant [[id number]]  (Abnormal) Collected: 09/27/23 2233    Lab Status: Final result Specimen: Nares from Nasopharyngeal Swab Updated: 09/27/23 2326     SARS-CoV-2 Positive     INFLUENZA A PCR Negative     INFLUENZA B PCR Negative     RSV PCR Negative    Narrative:      FOR PEDIATRIC PATIENTS - copy/paste COVID Guidelines URL to browser: [email or web address]    SARS-CoV-2 assay is a Nucleic Acid Amplification assay intended for the  qualitative detection of nucleic acid from SARS-CoV-2 in nasopharyngeal  swabs. Results are for the presumptive identification of SARS-CoV-2 RNA. Positive results are indicative of infection with SARS-CoV-2, the virus  causing COVID-19, but do not rule out bacterial infection or co-infection  with other viruses. Laboratories within the [facility] and its  territories are required to report all positive results to the appropriate  public health authorities. Negative results do not preclude SARS-CoV-2  infection and should not be used as the sole basis for treatment or other  patient management decisions. Negative results must be combined with  clinical observations, patient history, and epidemiological information. This test has not been FDA cleared or approved. This test has been authorized by FDA under an Emergency Use Authorization  (EUA). This test is only authorized for the duration of time the  declaration that circumstances exist justifying the authorization of the  emergency use of an in vitro diagnostic tests for detection of SARS-CoV-2  virus and/or diagnosis of COVID-19 infection under section 564(b)(1) of  the Act, 21 U. S.C. 563AUB-9(C)(7), unless the authorization is terminated  or revoked sooner. The test has been validated but independent review by FDA  and CLIA is pending. Test performed using [employer] Gene[employer]pert: This RT-PCR assay targets N2,  a region unique to SARS-CoV-2. A conserved region in the E-gene was chosen  for pan-Sarbecovirus detection which includes SARS-CoV-2. According to CMS-2020-01-R, this platform meets the definition of high-throughput technology. XR chest 2 views   ED Interpretation by Theresa Ruiz MD (09/27 2245)   No acute cardiopulmonary.              Procedures  Procedures      ED Course  ED Course as of 09/27/23 2328   Wed Sep 27, 2023   2326 SARS-COV-2(!): Positive                             SBIRT 20yo+    Flowsheet Row Most Recent Value   Initial Alcohol Screen: US AUDIT-C     1. How often do you have a drink containing alcohol? 0 Filed at: 09/27/2023 2209   2. How many drinks containing alcohol do you have on a typical day you are drinking? 0 Filed at: 09/27/2023 2209   3a. Male UNDER 65: How often do you have five or more drinks on one occasion? 0 Filed at: 09/27/2023 2209   3b. FEMALE Any Age, or MALE 65+: How often do you have 4 or more drinks on one occassion? 0 Filed at: 09/27/2023 2209   Audit-C Score 0 Filed at: 09/27/2023 2209   TRE: How many times in the past year have you. .. Used an illegal drug or used a prescription medication for non-medical reasons? Never Filed at: 09/27/2023 2209                Medical Decision Making  27yo m presents w/ congestion, fullness of the ear, cough after sick contact exposure  Ddx includes but not limited to viral syndrome, flu, covid, rsv, pna  CXR, viral panel. Given tylenol and toradol for sx relief  CXR w/ no acute findings. Sx likely viral in nature. F/u w/ pcp as needed. Tylenol, ibuprofen, and flonase for sx relief. Discussed symptomatic therapy. Discharge home to self care w/ strict return precautions for worsening sx. Patient understanding and in agreement w/ plan. Amount and/or Complexity of Data Reviewed  Labs:  Decision-making details documented in ED Course. Radiology: ordered and independent interpretation performed. Risk  OTC drugs. Prescription drug management.             Disposition  Final diagnoses:   Viral syndrome   Acute cough   Nasal congestion   COVID     Time reflects when diagnosis was documented in both MDM as applicable and the Disposition within this note     Time User Action Codes Description Comment    9/27/2023 10:53 PM Balbina Jeong Add [B34.9] Viral syndrome     9/27/2023 10:54 PM Yusuf Jeong Add [R05.1] Acute cough     9/27/2023 10:54 PM Balbina Jeong Add [R09.81] Nasal congestion     9/27/2023 11:27 PM Balbina Jeong Add [U07.1] COVID     9/27/2023 11:27 PM Balbina Jeong Modify [B34.9] Viral syndrome     9/27/2023 11:27 PM Luisito Jeong Modify [U07.1] [facility]       ED Disposition     ED Disposition   Discharge    Condition   Stable    Date/Time   Wed Sep 27, 2023 10:45 PM    Comment   Casi Jimenez discharge to home/self care. Follow-up Information     Follow up With Specialties Details Why Contact Info Additional [address], CRNP Nurse Practitioner Schedule an appointment as soon as possible for a visit  As needed [address] [place] [address] [address] [place] [address]/[facility] Emergency Department Emergency Medicine Go to  If symptoms worsen [address] [place] [address]  [phone] [address] [facility] Emergency Department          Patient's Medications   Discharge Prescriptions    FLUTICASONE (FLONASE) 50 MCG/ACT NASAL SPRAY    1 spray into each nostril daily       Start Date: 9/27/2023 End Date: --       Order Dose: 1 spray       Quantity: 16 g    Refills: 0     No discharge procedures on file. PDMP Review     None           ED Provider  Attending physically available and evaluated Casi Jimenez. I managed the patient along with the ED Attending.     Electronically Signed by         Theresa Ruiz MD  09/27/23 1741

## 2023-11-26 ENCOUNTER — HOSPITAL ENCOUNTER (EMERGENCY)
Facility: HOSPITAL | Age: 33
Discharge: HOME | End: 2023-11-26
Attending: EMERGENCY MEDICINE
Payer: MEDICARE

## 2023-11-26 VITALS
HEART RATE: 88 BPM | TEMPERATURE: 98.4 F | RESPIRATION RATE: 18 BRPM | DIASTOLIC BLOOD PRESSURE: 72 MMHG | OXYGEN SATURATION: 96 % | SYSTOLIC BLOOD PRESSURE: 146 MMHG

## 2023-11-26 DIAGNOSIS — R56.9 SEIZURE-LIKE ACTIVITY (HCC): Primary | ICD-10-CM

## 2023-11-26 DIAGNOSIS — F41.9 ANXIETY: ICD-10-CM

## 2023-11-26 LAB
ALBUMIN SERPL BCP-MCNC: 4.2 G/DL (ref 3.5–5)
ALP SERPL-CCNC: 66 U/L (ref 34–104)
ALT SERPL W P-5'-P-CCNC: 40 U/L (ref 7–52)
ANION GAP SERPL CALCULATED.3IONS-SCNC: 8 MMOL/L
AST SERPL W P-5'-P-CCNC: 33 U/L (ref 13–39)
ATRIAL RATE: 91 BPM
BASOPHILS # BLD AUTO: 0.09 THOUSANDS/ÂΜL (ref 0–0.1)
BASOPHILS NFR BLD AUTO: 1 % (ref 0–1)
BILIRUB SERPL-MCNC: 0.3 MG/DL (ref 0.2–1)
BUN SERPL-MCNC: 18 MG/DL (ref 5–25)
CALCIUM SERPL-MCNC: 9.2 MG/DL (ref 8.4–10.2)
CHLORIDE SERPL-SCNC: 103 MMOL/L (ref 96–108)
CO2 SERPL-SCNC: 27 MMOL/L (ref 21–32)
CREAT SERPL-MCNC: 0.92 MG/DL (ref 0.6–1.3)
EOSINOPHIL # BLD AUTO: 0.51 THOUSAND/ÂΜL (ref 0–0.61)
EOSINOPHIL NFR BLD AUTO: 4 % (ref 0–6)
ERYTHROCYTE [DISTWIDTH] IN BLOOD BY AUTOMATED COUNT: 13.2 % (ref 11.6–15.1)
GFR SERPL CREATININE-BSD FRML MDRD: 110 ML/MIN/1.73SQ M
GLUCOSE SERPL-MCNC: 80 MG/DL (ref 65–140)
HCT VFR BLD AUTO: 46.7 % (ref 36.5–49.3)
HGB BLD-MCNC: 15.6 G/DL (ref 12–17)
IMM GRANULOCYTES # BLD AUTO: 0.09 THOUSAND/UL (ref 0–0.2)
IMM GRANULOCYTES NFR BLD AUTO: 1 % (ref 0–2)
LYMPHOCYTES # BLD AUTO: 3.46 THOUSANDS/ÂΜL (ref 0.6–4.47)
LYMPHOCYTES NFR BLD AUTO: 26 % (ref 14–44)
MCH RBC QN AUTO: 30.2 PG (ref 26.8–34.3)
MCHC RBC AUTO-ENTMCNC: 33.4 G/DL (ref 31.4–37.4)
MCV RBC AUTO: 90 FL (ref 82–98)
MONOCYTES # BLD AUTO: 0.89 THOUSAND/ÂΜL (ref 0.17–1.22)
MONOCYTES NFR BLD AUTO: 7 % (ref 4–12)
NEUTROPHILS # BLD AUTO: 8.53 THOUSANDS/ÂΜL (ref 1.85–7.62)
NEUTS SEG NFR BLD AUTO: 61 % (ref 43–75)
NRBC BLD AUTO-RTO: 0 /100 WBCS
P AXIS: -2 DEGREES
PLATELET # BLD AUTO: 221 THOUSANDS/UL (ref 149–390)
PMV BLD AUTO: 10 FL (ref 8.9–12.7)
POTASSIUM SERPL-SCNC: 4 MMOL/L (ref 3.5–5.3)
PR INTERVAL: 112 MS
PROT SERPL-MCNC: 7.2 G/DL (ref 6.4–8.4)
QRS AXIS: 23 DEGREES
QRSD INTERVAL: 94 MS
QT INTERVAL: 336 MS
QTC INTERVAL: 413 MS
RBC # BLD AUTO: 5.17 MILLION/UL (ref 3.88–5.62)
SODIUM SERPL-SCNC: 138 MMOL/L (ref 135–147)
T WAVE AXIS: 31 DEGREES
TSH SERPL DL<=0.05 MIU/L-ACNC: 2.88 UIU/ML (ref 0.45–4.5)
VENTRICULAR RATE: 91 BPM
WBC # BLD AUTO: 13.57 THOUSAND/UL (ref 4.31–10.16)

## 2023-11-26 PROCEDURE — 99284 EMERGENCY DEPT VISIT MOD MDM: CPT

## 2023-11-26 PROCEDURE — 80053 COMPREHEN METABOLIC PANEL: CPT

## 2023-11-26 PROCEDURE — 93005 ELECTROCARDIOGRAM TRACING: CPT

## 2023-11-26 PROCEDURE — 85025 COMPLETE CBC W/AUTO DIFF WBC: CPT

## 2023-11-26 PROCEDURE — 93010 ELECTROCARDIOGRAM REPORT: CPT | Performed by: INTERNAL MEDICINE

## 2023-11-26 PROCEDURE — 36415 COLL VENOUS BLD VENIPUNCTURE: CPT

## 2023-11-26 PROCEDURE — 84443 ASSAY THYROID STIM HORMONE: CPT

## 2023-11-26 RX ORDER — DIAPER,BRIEF,INFANT-TODD,DISP
EACH MISCELLANEOUS 4 TIMES DAILY PRN
Status: DISCONTINUED | OUTPATIENT
Start: 2023-11-26 | End: 2023-11-26 | Stop reason: HOSPADM

## 2023-11-26 RX ADMIN — HYDROCORTISONE: 1 CREAM TOPICAL at 20:37

## 2023-11-27 NOTE — ED PROVIDER NOTES
History  Chief Complaint   Patient presents with    Seizure - Prior Hx Of     Pt has partial seizure, one arm one leg, per ems, bgl 109, seizure hx, hasn't had one in approx 1 yr, does not take seizure meds. 28-year-old male with PMH of asthma, depression, Chiari I malformation presenting for evaluation of seizure-like activity. States approximately 1 hour PTA his right arm began shaking, followed by his right leg. He called EMS, who obtained a blood glucose of 109. Has a history of seizure-like activity such as this, however has not had this happen since 2019 when he was first evaluated for it. States it happens when he is extremely stressed out, which has been the case lately with multiple anxiety-inducing events in his life. Did follow-up with neurology after his initial episodes in 2019, did not have seizure-like activity on EEG, was not started on any seizure medication. Did have an MRI at the time that showed Chiari I malformation, 2 different lesions that needed 6-month follow-up which patient did not complete as he lost his medical insurance. Still does not have medical insurance. Patiently currently only complaining of a mild headache, took Tylenol PTA. No paresthesias, weakness, facial numbness, facial droop, confusion. Prior to Admission Medications   Prescriptions Last Dose Informant Patient Reported? Taking? LYRICA 50 MG capsule   Yes No   Sig: Take 50 mg by mouth 2 (two) times a day   VENTOLIN  (90 Base) MCG/ACT inhaler   Yes No   Sig: as needed   fluticasone (FLONASE) 50 mcg/act nasal spray   No No   Si spray into each nostril daily   naproxen (NAPROSYN) 250 mg tablet  Self Yes No   Sig: Take 250 mg by mouth 2 (two) times a day with meals      Facility-Administered Medications: None       Past Medical History:   Diagnosis Date    Asthma     Depression     Seizure ([address])        History reviewed. No pertinent surgical history.     Family History   Problem Relation Age of Onset    Mental illness Mother     Mental illness Sister      I have reviewed and agree with the history as documented. E-Cigarette/Vaping     E-Cigarette/Vaping Substances     Social History     Tobacco Use    Smoking status: Every Day     Packs/day: 0.50     Types: Cigarettes    Smokeless tobacco: Current   Substance Use Topics    Alcohol use: No    Drug use: No       Review of Systems   Constitutional:  Negative for chills and fever. HENT:  Negative for ear pain and sore throat. Eyes:  Negative for pain and visual disturbance. Respiratory:  Negative for cough and shortness of breath. Cardiovascular:  Negative for chest pain and palpitations. Gastrointestinal:  Negative for abdominal pain and vomiting. Genitourinary:  Negative for dysuria and hematuria. Musculoskeletal:  Negative for arthralgias and back pain. Skin:  Negative for color change and rash. Neurological:  Positive for headaches (mild). Negative for dizziness, tremors, seizures, syncope, facial asymmetry, speech difficulty, weakness, light-headedness and numbness. Seizure-like activity, shaking of upper and lower extremities. Psychiatric/Behavioral:  The patient is nervous/anxious. All other systems reviewed and are negative. Physical Exam  Physical Exam  Vitals and nursing note reviewed. Constitutional:       General: He is not in acute distress. Appearance: He is well-developed. HENT:      Head: Normocephalic and atraumatic. Eyes:      Conjunctiva/sclera: Conjunctivae normal.   Cardiovascular:      Rate and Rhythm: Normal rate and regular rhythm. Heart sounds: No murmur heard. Pulmonary:      Effort: Pulmonary effort is normal. No respiratory distress. Breath sounds: Normal breath sounds. Abdominal:      Palpations: Abdomen is soft. Tenderness: There is no abdominal tenderness. Musculoskeletal:         General: No swelling. Cervical back: Neck supple.    Skin:     General: Skin is warm and dry. Capillary Refill: Capillary refill takes less than 2 seconds. Neurological:      General: No focal deficit present. Mental Status: He is alert and oriented to person, place, and time. Mental status is at baseline. GCS: GCS eye subscore is 4. GCS verbal subscore is 5. GCS motor subscore is 6. Cranial Nerves: Cranial nerves 2-12 are intact. Sensory: Sensation is intact. Motor: Motor function is intact. Coordination: Coordination is intact. Gait: Gait is intact.    Psychiatric:         Mood and Affect: Mood normal.         Vital Signs  ED Triage Vitals [11/26/23 1849]   Temperature Pulse Respirations Blood Pressure SpO2   98.4 °F (36.9 °C) 90 18 170/78 100 %      Temp Source Heart Rate Source Patient Position - Orthostatic VS BP Location FiO2 (%)   Oral Monitor Sitting Right arm --      Pain Score       No Pain           Vitals:    11/26/23 1849 11/26/23 2039   BP: 170/78 146/72   Pulse: 90 88   Patient Position - Orthostatic VS: Sitting Lying         Visual Acuity      ED Medications  Medications - No data to display    Diagnostic Studies  Results Reviewed       Procedure Component Value Units Date/Time    TSH, 3rd generation with Free T4 reflex [[id number]]  (Normal) Collected: 11/26/23 1932    Lab Status: Final result Specimen: Blood from Arm, Right Updated: 11/26/23 2010     TSH 3RD GENERATON 2.885 uIU/mL     Comprehensive metabolic panel [[id number]] Collected: 11/26/23 1932    Lab Status: Final result Specimen: Blood from Arm, Right Updated: 11/26/23 1953     Sodium 138 mmol/L      Potassium 4.0 mmol/L      Chloride 103 mmol/L      CO2 27 mmol/L      ANION GAP 8 mmol/L      BUN 18 mg/dL      Creatinine 0.92 mg/dL      Glucose 80 mg/dL      Calcium 9.2 mg/dL      AST 33 U/L      ALT 40 U/L      Alkaline Phosphatase 66 U/L      Total Protein 7.2 g/dL      Albumin 4.2 g/dL      Total Bilirubin 0.30 mg/dL      eGFR 110 ml/min/1.73sq m     Narrative: National Kidney Disease Foundation guidelines for Chronic Kidney Disease (CKD):     Stage 1 with normal or high GFR (GFR > 90 mL/min/1.73 square meters)    Stage 2 Mild CKD (GFR = 60-89 mL/min/1.73 square meters)    Stage 3A Moderate CKD (GFR = 45-59 mL/min/1.73 square meters)    Stage 3B Moderate CKD (GFR = 30-44 mL/min/1.73 square meters)    Stage 4 Severe CKD (GFR = 15-29 mL/min/1.73 square meters)    Stage 5 End Stage CKD (GFR <15 mL/min/1.73 square meters)  Note: GFR calculation is accurate only with a steady state creatinine    CBC and differential [[id number]]  (Abnormal) Collected: 11/26/23 1932    Lab Status: Final result Specimen: Blood from Arm, Right Updated: 11/26/23 1939     WBC 13.57 Thousand/uL      RBC 5.17 Million/uL      Hemoglobin 15.6 g/dL      Hematocrit 46.7 %      MCV 90 fL      MCH 30.2 pg      MCHC 33.4 g/dL      RDW 13.2 %      MPV 10.0 fL      Platelets 630 Thousands/uL      nRBC 0 /100 WBCs      Neutrophils Relative 61 %      Immat GRANS % 1 %      Lymphocytes Relative 26 %      Monocytes Relative 7 %      Eosinophils Relative 4 %      Basophils Relative 1 %      Neutrophils Absolute 8.53 Thousands/µL      Immature Grans Absolute 0.09 Thousand/uL      Lymphocytes Absolute 3.46 Thousands/µL      Monocytes Absolute 0.89 Thousand/µL      Eosinophils Absolute 0.51 Thousand/µL      Basophils Absolute 0.09 Thousands/µL                    No orders to display              Procedures  Procedures         ED Course                               SBIRT 20yo+      Flowsheet Row Most Recent Value   Initial Alcohol Screen: US AUDIT-C     1. How often do you have a drink containing alcohol? 0 Filed at: 11/26/2023 1849   2. How many drinks containing alcohol do you have on a typical day you are drinking? 0 Filed at: 11/26/2023 1849   3a. Male UNDER 65: How often do you have five or more drinks on one occasion?  0 Filed at: 11/26/2023 1849   Audit-C Score 0 Filed at: 11/26/2023 1849   TRE: How many times in the past year have you. .. Used an illegal drug or used a prescription medication for non-medical reasons? Never Filed at: 11/26/2023 [facility] Marsha Cordova  74-year-old male presents for evaluation of seizure-like activity, described as violent shaking of his right arm and leg. Self resolved. History of similar episodes. Seizure meds not indicated after evaluated by neurology in 2019. Exam: Patient in NAD, AOx3, hypertensive; neurological exam unremarkable, CN II through XII intact, 5/5 strength of upper and lower extremities bilaterally. Heart RRR, lungs CTAB. EKG normal sinus. Will obtain basic blood work to assess for other organic causes of seizure activity. Workup: CBC, CMP, TSH. Mild leukocytosis on CBC is nonspecific. Rest of lab work unremarkable. Patient's presentation today seems similar to his presentation in 2019, likely seizure activity caused by stress. That said, he definitely needs to follow-up with neurology for evaluation. Had an MRI when he was first evaluated that showed some lesions needing to be reassessed at the 6-month artur, which was never done. Discussed this with the patient, he does not currently have health insurance which is why he has not followed up with neurology. Offered patient resources to help him get set up with health insurance, he declined and states that [facility] people at [facility] that are offering to help him find health insurance. Reiterated the importance of doing this so that he can get set up with neurology and other primary medical care. Patient expresses understanding of the condition, treatment plan, follow-up instructions, and return precautions. Amount and/or Complexity of Data Reviewed  Labs: ordered.              Disposition  Final diagnoses:   Seizure-like activity ([address])   Anxiety     Time reflects when diagnosis was documented in both MDM as applicable and the Disposition within this note Time User Action Codes Description Comment    11/26/2023  8:47 PM Sandra David Add [R56.9] Seizure-like activity ([address])     11/26/2023  8:47 PM Sandra David Add [F41.9] Anxiety           ED Disposition       ED Disposition   Discharge    Condition   Stable    Date/Time   Sun Nov 26, 2023 2049    [address] discharge to home/self care. Follow-up Information       Follow up With Specialties Details Why Contact Info Additional [address] [facility] Schedule an appointment as soon as possible for a visit   [address] [address]  [address] Neurology [facility], [facility]4 [address], [address] [place] [address]            Discharge Medication List as of 11/26/2023  8:49 PM        CONTINUE these medications which have NOT CHANGED    Details   fluticasone (FLONASE) 50 mcg/act nasal spray 1 spray into each nostril daily, Starting Wed 9/27/2023, Normal      LYRICA 50 MG capsule Take 50 mg by mouth 2 (two) times a day, Starting Wed 4/10/2019, Historical Med      naproxen (NAPROSYN) 250 mg tablet Take 250 mg by mouth 2 (two) times a day with meals, Historical Med      VENTOLIN  (90 Base) MCG/ACT inhaler as needed, Starting Mon 3/25/2019, Historical Med             No discharge procedures on file.     PDMP Review       None            ED Provider  Electronically Signed by             Gonzalez Leon PA-C  11/27/23 9453

## 2023-12-05 ENCOUNTER — HOSPITAL ENCOUNTER (EMERGENCY)
Facility: HOSPITAL | Age: 33
Discharge: HOME | End: 2023-12-05
Attending: EMERGENCY MEDICINE
Payer: COMMERCIAL

## 2023-12-05 VITALS
SYSTOLIC BLOOD PRESSURE: 171 MMHG | TEMPERATURE: 98 F | OXYGEN SATURATION: 99 % | HEART RATE: 99 BPM | RESPIRATION RATE: 16 BRPM | DIASTOLIC BLOOD PRESSURE: 93 MMHG

## 2023-12-05 DIAGNOSIS — G40.919 BREAKTHROUGH SEIZURE (HCC): Primary | ICD-10-CM

## 2023-12-05 PROCEDURE — 99284 EMERGENCY DEPT VISIT MOD MDM: CPT | Performed by: EMERGENCY MEDICINE

## 2023-12-05 PROCEDURE — 99284 EMERGENCY DEPT VISIT MOD MDM: CPT

## 2023-12-05 NOTE — ED PROVIDER NOTES
History  Chief Complaint   Patient presents with    Seizure - Prior Hx Of     Pt was a bible study and was having possible seizure activity of "staring off into space" for approx 10 minutes. Pt arrives to Er AA&Ox3, only c/o headache     30-year-old male with history of seizure disorder presenting emerged department with possible seizure at Bible study. Patient notes that he was staring off into space for 10 minutes. Had a headache when EMS picked him up. No postictal state. Currently headache resolved. Patient has a used to be on Depakote in the past for seizures but was stopped due to the side effects. Currently not on any seizure medications because he cannot afford them. He is not working, staying at the [employer], currently homeless. Prior to Admission Medications   Prescriptions Last Dose Informant Patient Reported? Taking? LYRICA 50 MG capsule   Yes No   Sig: Take 50 mg by mouth 2 (two) times a day   VENTOLIN  (90 Base) MCG/ACT inhaler   Yes No   Sig: as needed   fluticasone (FLONASE) 50 mcg/act nasal spray   No No   Si spray into each nostril daily   naproxen (NAPROSYN) 250 mg tablet  Self Yes No   Sig: Take 250 mg by mouth 2 (two) times a day with meals      Facility-Administered Medications: None       Past Medical History:   Diagnosis Date    Asthma     Depression     Seizure ([address])        History reviewed. No pertinent surgical history. Family History   Problem Relation Age of Onset    Mental illness Mother     Mental illness Sister      I have reviewed and agree with the history as documented. E-Cigarette/Vaping     E-Cigarette/Vaping Substances     Social History     Tobacco Use    Smoking status: Every Day     Packs/day: 0.50     Types: Cigarettes    Smokeless tobacco: Current   Substance Use Topics    Alcohol use: Not Currently    Drug use: Not Currently     Types: Marijuana       Review of Systems   Constitutional:  Negative for chills and fever. HENT:  Negative for ear pain and sore throat. Eyes:  Negative for pain and visual disturbance. Respiratory:  Negative for cough and shortness of breath. Cardiovascular:  Negative for chest pain and palpitations. Gastrointestinal:  Negative for abdominal pain and vomiting. Genitourinary:  Negative for dysuria and hematuria. Musculoskeletal:  Negative for arthralgias and back pain. Skin:  Negative for color change and rash. Neurological:  Negative for seizures and syncope. All other systems reviewed and are negative. Physical Exam  Physical Exam  Vitals and nursing note reviewed. Constitutional:       General: He is not in acute distress. Appearance: He is well-developed. HENT:      Head: Normocephalic and atraumatic. Eyes:      Conjunctiva/sclera: Conjunctivae normal.   Cardiovascular:      Rate and Rhythm: Normal rate and regular rhythm. Heart sounds: No murmur heard. Pulmonary:      Effort: Pulmonary effort is normal. No respiratory distress. Breath sounds: Normal breath sounds. Abdominal:      Palpations: Abdomen is soft. Tenderness: There is no abdominal tenderness. Musculoskeletal:         General: No swelling. Cervical back: Neck supple. Skin:     General: Skin is warm and dry. Capillary Refill: Capillary refill takes less than 2 seconds. Neurological:      Mental Status: He is alert.    Psychiatric:         Mood and Affect: Mood normal.         Vital Signs  ED Triage Vitals [12/05/23 1008]   Temperature Pulse Respirations Blood Pressure SpO2   98 °F (36.7 °C) 99 16 (!) 171/93 99 %      Temp Source Heart Rate Source Patient Position - Orthostatic VS BP Location FiO2 (%)   Tympanic Monitor Lying Left arm --      Pain Score       --           Vitals:    12/05/23 1008   BP: (!) 171/93   Pulse: 99   Patient Position - Orthostatic VS: Lying         Visual Acuity      ED Medications  Medications - No data to display    Diagnostic Studies  Results Reviewed       None                   No orders to display              Procedures  Procedures         ED Course                                             Medical Decision Making  79-year-old male with history of seizure disorder. Has neurology appointment on the 26th. Not on antiepileptic. Offered to prescribe him antiepileptic but patient refused saying that he cannot afford, has no money, [facility] Disposition  Final diagnoses:   Breakthrough seizure ([address])     Time reflects when diagnosis was documented in both MDM as applicable and the Disposition within this note       Time User Action Codes Description Comment    12/5/2023 10:37 AM Briana Teran Add [G40.919] Breakthrough seizure [facility])           ED Disposition       ED Disposition   Discharge    Condition   Stable    Date/Time   Tue Dec 5, 2023 10:37 AM    Comment   Hardeep Hoop discharge to home/self care. Follow-up Information       Follow up With Specialties Details Why [address] [facility], [facility] Nurse Practitioner   [address] [place] [address]  [phone]              Patient's Medications   Discharge Prescriptions    No medications on file       No discharge procedures on file.     PDMP Review       None            ED Provider  Electronically Signed by             Zoie Lau MD  12/05/23 1037

## 2023-12-12 ENCOUNTER — HOSPITAL ENCOUNTER (EMERGENCY)
Facility: HOSPITAL | Age: 33
Discharge: HOME | End: 2023-12-12
Attending: EMERGENCY MEDICINE
Payer: COMMERCIAL

## 2023-12-12 VITALS
OXYGEN SATURATION: 95 % | WEIGHT: 315 LBS | BODY MASS INDEX: 49.7 KG/M2 | SYSTOLIC BLOOD PRESSURE: 169 MMHG | TEMPERATURE: 98.8 F | RESPIRATION RATE: 20 BRPM | HEART RATE: 102 BPM | DIASTOLIC BLOOD PRESSURE: 92 MMHG

## 2023-12-12 DIAGNOSIS — H65.93 MIDDLE EAR EFFUSION, BILATERAL: Primary | ICD-10-CM

## 2023-12-12 PROCEDURE — 99282 EMERGENCY DEPT VISIT SF MDM: CPT

## 2023-12-12 PROCEDURE — 99283 EMERGENCY DEPT VISIT LOW MDM: CPT

## 2023-12-12 RX ORDER — LORATADINE 10 MG/1
10 TABLET ORAL DAILY
Qty: 20 TABLET | Refills: 0 | Status: SHIPPED | OUTPATIENT
Start: 2023-12-12

## 2023-12-12 RX ORDER — FLUTICASONE PROPIONATE 50 MCG
1 SPRAY, SUSPENSION (ML) NASAL DAILY
Qty: 16 G | Refills: 0 | Status: SHIPPED | OUTPATIENT
Start: 2023-12-12

## 2023-12-12 NOTE — ED PROVIDER NOTES
History  Chief Complaint   Patient presents with    Ear Problem     Pt started with hearing issues 1 week ago. Pt stated that he has no pain. Was sick but occurred prior to that     33-year-old-year-old male past medical history of seizures, asthma, depression presents to emergency department complaining of bilateral ear pressure and muffled hearing following URI.  States this happens when he gets sick.      History provided by:  Patient  Earache  Location:  Bilateral  Quality:  Pressure  Duration:  1 week  Chronicity:  Recurrent  Context: recent URI    Context: not direct blow and not elevation change    Associated symptoms: congestion    Associated symptoms: no cough, no diarrhea, no ear discharge, no fever, no headaches, no neck pain, no rash, no sore throat, no tinnitus and no vomiting  Hearing loss: no complete loss of hearing reports it sounds muffled bilaterally.  Risk factors: no chronic ear infection and no prior ear surgery        Prior to Admission Medications   Prescriptions Last Dose Informant Patient Reported? Taking?   LYRICA 50 MG capsule   Yes No   Sig: Take 50 mg by mouth 2 (two) times a day   VENTOLIN  (90 Base) MCG/ACT inhaler   Yes No   Sig: as needed   fluticasone (FLONASE) 50 mcg/act nasal spray   No No   Si spray into each nostril daily   naproxen (NAPROSYN) 250 mg tablet  Self Yes No   Sig: Take 250 mg by mouth 2 (two) times a day with meals      Facility-Administered Medications: None       Past Medical History:   Diagnosis Date    Asthma     Depression     Seizure (HCC)        History reviewed. No pertinent surgical history.    Family History   Problem Relation Age of Onset    Mental illness Mother     Mental illness Sister      I have reviewed and agree with the history as documented.    E-Cigarette/Vaping     E-Cigarette/Vaping Substances     Social History     Tobacco Use    Smoking status: Every Day     Current packs/day: 0.50     Types: Cigarettes    Smokeless tobacco:  Current   Substance Use Topics    Alcohol use: Not Currently    Drug use: Not Currently     Types: Marijuana       Review of Systems   Constitutional:  Negative for chills, diaphoresis and fever.   HENT:  Positive for congestion and ear pain. Negative for ear discharge, facial swelling, sinus pain, sore throat and tinnitus. Hearing loss: no complete loss of hearing reports it sounds muffled bilaterally.   Eyes:  Negative for pain, redness and visual disturbance.   Respiratory:  Negative for cough.    Gastrointestinal:  Negative for diarrhea and vomiting.   Musculoskeletal:  Negative for neck pain and neck stiffness.   Skin:  Negative for rash.   Neurological:  Negative for dizziness, syncope, weakness, numbness and headaches.   All other systems reviewed and are negative.      Physical Exam  Physical Exam  Vitals and nursing note reviewed.   Constitutional:       General: He is awake. He is not in acute distress.     Appearance: Normal appearance. He is not ill-appearing, toxic-appearing or diaphoretic.   HENT:      Head: Normocephalic.      Right Ear: Ear canal and external ear normal. No drainage, swelling or tenderness. A middle ear effusion is present. No mastoid tenderness. No hemotympanum. Tympanic membrane is not injected, perforated or erythematous.      Left Ear: Ear canal and external ear normal. No drainage, swelling or tenderness. A middle ear effusion is present. No mastoid tenderness. No hemotympanum. Tympanic membrane is not injected, perforated or erythematous.      Ears:      Comments: Hearing grossly intact bilaterally      Nose: Congestion present.      Mouth/Throat:      Lips: Pink.      Mouth: Mucous membranes are moist.      Pharynx: Oropharynx is clear.   Eyes:      General: Vision grossly intact.      Extraocular Movements: Extraocular movements intact.      Conjunctiva/sclera: Conjunctivae normal.      Pupils: Pupils are equal, round, and reactive to light.   Cardiovascular:      Rate and  Rhythm: Normal rate and regular rhythm.      Heart sounds: Normal heart sounds.   Pulmonary:      Effort: Pulmonary effort is normal. No respiratory distress.      Breath sounds: Normal breath sounds.   Abdominal:      General: There is no distension.   Musculoskeletal:      Cervical back: Normal range of motion. No rigidity.   Skin:     General: Skin is warm and dry.      Capillary Refill: Capillary refill takes less than 2 seconds.      Findings: No erythema or rash.   Neurological:      Mental Status: He is alert.      Cranial Nerves: No cranial nerve deficit.         Vital Signs  ED Triage Vitals [12/12/23 0933]   Temperature Pulse Respirations Blood Pressure SpO2   98.8 °F (37.1 °C) 102 20 169/92 95 %      Temp Source Heart Rate Source Patient Position - Orthostatic VS BP Location FiO2 (%)   Tympanic Monitor Sitting Left arm --      Pain Score       --           Vitals:    12/12/23 0933   BP: 169/92   Pulse: 102   Patient Position - Orthostatic VS: Sitting         Visual Acuity      ED Medications  Medications - No data to display    Diagnostic Studies  Results Reviewed       None                   No orders to display              Procedures  Procedures         ED Course                               SBIRT 22yo+      Flowsheet Row Most Recent Value   Initial Alcohol Screen: US AUDIT-C     1. How often do you have a drink containing alcohol? 0 Filed at: 12/12/2023 0933   2. How many drinks containing alcohol do you have on a typical day you are drinking?  0 Filed at: 12/12/2023 0933   3a. Male UNDER 65: How often do you have five or more drinks on one occasion? 0 Filed at: 12/12/2023 0933   Audit-C Score 0 Filed at: 12/12/2023 0933   TRE: How many times in the past year have you...    Used an illegal drug or used a prescription medication for non-medical reasons? Never Filed at: 12/12/2023 0933                      Medical Decision Making  Uri and ear pressure/muffled. Hearing grossly intact on exam. No  "evidence of acute infection including otitis media or externa. No CN deficit. +middle ear effusion. Plan for supportive care, ENT follow up.     All imaging and/or lab testing discussed with patient, strict return to ED precautions discussed. Patient recommended to follow up promptly with appropriate outpatient provider. Patient and/or family members verbalizes understanding and agrees with plan. Patient and/or family members were given opportunity to ask questions, all questions were answered at this time. Patient is stable for discharge.     Portions of the record may have been created with voice recognition software. Occasional wrong word or \"sound a like\" substitutions may have occurred due to the inherent limitations of voice recognition software. Read the chart carefully and recognize, using context, where substitutions have occurred.       Risk  OTC drugs.             Disposition  Final diagnoses:   Middle ear effusion, bilateral     Time reflects when diagnosis was documented in both MDM as applicable and the Disposition within this note       Time User Action Codes Description Comment    12/12/2023  9:59 AM Pura Carreno Add [H65.93] Middle ear effusion, bilateral           ED Disposition       ED Disposition   Discharge    Condition   Stable    Date/Time   Tue Dec 12, 2023 1000    Comment   Joe Milian discharge to home/self care.                   Follow-up Information       Follow up With Specialties Details Why Contact Info Additional Information    RICK Benjamin Nurse Practitioner   [facility] [address] [phone]       [facility] Otolaryngology   [address] [place] [phone] [facility], [address] [place], [place], [id number] [phone]            Discharge Medication List as of 12/12/2023 10:03 AM        START taking these medications    Details "   loratadine (CLARITIN) 10 mg tablet Take 1 tablet (10 mg total) by mouth daily, Starting Tue 12/12/2023, Normal           CONTINUE these medications which have CHANGED    Details   fluticasone (FLONASE) 50 mcg/act nasal spray 1 spray into each nostril daily, Starting Tue 12/12/2023, Normal           CONTINUE these medications which have NOT CHANGED    Details   LYRICA 50 MG capsule Take 50 mg by mouth 2 (two) times a day, Starting Wed 4/10/2019, Historical Med      naproxen (NAPROSYN) 250 mg tablet Take 250 mg by mouth 2 (two) times a day with meals, Historical Med      VENTOLIN  (90 Base) MCG/ACT inhaler as needed, Starting Mon 3/25/2019, Historical Med             No discharge procedures on file.    PDMP Review       None            ED Provider  Electronically Signed by             Pura Carreno PA-C  12/13/23 8793

## 2024-01-11 ENCOUNTER — APPOINTMENT (EMERGENCY)
Dept: CT IMAGING | Facility: HOSPITAL | Age: 34
End: 2024-01-11
Payer: COMMERCIAL

## 2024-01-11 ENCOUNTER — HOSPITAL ENCOUNTER (EMERGENCY)
Facility: HOSPITAL | Age: 34
Discharge: HOME | End: 2024-01-11
Attending: EMERGENCY MEDICINE
Payer: COMMERCIAL

## 2024-01-11 VITALS
SYSTOLIC BLOOD PRESSURE: 158 MMHG | DIASTOLIC BLOOD PRESSURE: 57 MMHG | HEART RATE: 84 BPM | TEMPERATURE: 98.7 F | OXYGEN SATURATION: 97 % | BODY MASS INDEX: 49.58 KG/M2 | WEIGHT: 315 LBS | RESPIRATION RATE: 16 BRPM

## 2024-01-11 DIAGNOSIS — R56.9 SEIZURE (HCC): Primary | ICD-10-CM

## 2024-01-11 LAB
ALBUMIN SERPL BCP-MCNC: 4.4 G/DL (ref 3.5–5)
ALP SERPL-CCNC: 54 U/L (ref 34–104)
ALT SERPL W P-5'-P-CCNC: 40 U/L (ref 7–52)
ANION GAP SERPL CALCULATED.3IONS-SCNC: 9 MMOL/L
AST SERPL W P-5'-P-CCNC: 28 U/L (ref 13–39)
ATRIAL RATE: 68 BPM
BASOPHILS # BLD AUTO: 0.07 THOUSANDS/ÂΜL (ref 0–0.1)
BASOPHILS NFR BLD AUTO: 1 % (ref 0–1)
BILIRUB SERPL-MCNC: 0.4 MG/DL (ref 0.2–1)
BUN SERPL-MCNC: 11 MG/DL (ref 5–25)
CALCIUM SERPL-MCNC: 9.3 MG/DL (ref 8.4–10.2)
CHLORIDE SERPL-SCNC: 100 MMOL/L (ref 96–108)
CO2 SERPL-SCNC: 29 MMOL/L (ref 21–32)
CREAT SERPL-MCNC: 0.88 MG/DL (ref 0.6–1.3)
EOSINOPHIL # BLD AUTO: 0.28 THOUSAND/ÂΜL (ref 0–0.61)
EOSINOPHIL NFR BLD AUTO: 3 % (ref 0–6)
ERYTHROCYTE [DISTWIDTH] IN BLOOD BY AUTOMATED COUNT: 12.8 % (ref 11.6–15.1)
GFR SERPL CREATININE-BSD FRML MDRD: 114 ML/MIN/1.73SQ M
GLUCOSE SERPL-MCNC: 75 MG/DL (ref 65–140)
GLUCOSE SERPL-MCNC: 77 MG/DL (ref 65–140)
HCT VFR BLD AUTO: 48.6 % (ref 36.5–49.3)
HGB BLD-MCNC: 16.3 G/DL (ref 12–17)
IMM GRANULOCYTES # BLD AUTO: 0.04 THOUSAND/UL (ref 0–0.2)
IMM GRANULOCYTES NFR BLD AUTO: 0 % (ref 0–2)
LYMPHOCYTES # BLD AUTO: 3.25 THOUSANDS/ÂΜL (ref 0.6–4.47)
LYMPHOCYTES NFR BLD AUTO: 34 % (ref 14–44)
MCH RBC QN AUTO: 29.9 PG (ref 26.8–34.3)
MCHC RBC AUTO-ENTMCNC: 33.5 G/DL (ref 31.4–37.4)
MCV RBC AUTO: 89 FL (ref 82–98)
MONOCYTES # BLD AUTO: 0.61 THOUSAND/ÂΜL (ref 0.17–1.22)
MONOCYTES NFR BLD AUTO: 6 % (ref 4–12)
NEUTROPHILS # BLD AUTO: 5.33 THOUSANDS/ÂΜL (ref 1.85–7.62)
NEUTS SEG NFR BLD AUTO: 56 % (ref 43–75)
NRBC BLD AUTO-RTO: 0 /100 WBCS
P AXIS: -6 DEGREES
PLATELET # BLD AUTO: 214 THOUSANDS/UL (ref 149–390)
PMV BLD AUTO: 9.9 FL (ref 8.9–12.7)
POTASSIUM SERPL-SCNC: 3.8 MMOL/L (ref 3.5–5.3)
PR INTERVAL: 136 MS
PROT SERPL-MCNC: 7.2 G/DL (ref 6.4–8.4)
QRS AXIS: 18 DEGREES
QRSD INTERVAL: 96 MS
QT INTERVAL: 436 MS
QTC INTERVAL: 463 MS
RBC # BLD AUTO: 5.45 MILLION/UL (ref 3.88–5.62)
SODIUM SERPL-SCNC: 138 MMOL/L (ref 135–147)
T WAVE AXIS: 12 DEGREES
VENTRICULAR RATE: 68 BPM
WBC # BLD AUTO: 9.58 THOUSAND/UL (ref 4.31–10.16)

## 2024-01-11 PROCEDURE — 36415 COLL VENOUS BLD VENIPUNCTURE: CPT | Performed by: EMERGENCY MEDICINE

## 2024-01-11 PROCEDURE — 99285 EMERGENCY DEPT VISIT HI MDM: CPT

## 2024-01-11 PROCEDURE — 96374 THER/PROPH/DIAG INJ IV PUSH: CPT

## 2024-01-11 PROCEDURE — 80053 COMPREHEN METABOLIC PANEL: CPT | Performed by: EMERGENCY MEDICINE

## 2024-01-11 PROCEDURE — 93005 ELECTROCARDIOGRAM TRACING: CPT

## 2024-01-11 PROCEDURE — G1004 CDSM NDSC: HCPCS

## 2024-01-11 PROCEDURE — 70450 CT HEAD/BRAIN W/O DYE: CPT

## 2024-01-11 PROCEDURE — 99285 EMERGENCY DEPT VISIT HI MDM: CPT | Performed by: EMERGENCY MEDICINE

## 2024-01-11 PROCEDURE — 82948 REAGENT STRIP/BLOOD GLUCOSE: CPT

## 2024-01-11 PROCEDURE — 85025 COMPLETE CBC W/AUTO DIFF WBC: CPT | Performed by: EMERGENCY MEDICINE

## 2024-01-11 RX ORDER — LORAZEPAM 2 MG/ML
0.5 INJECTION INTRAMUSCULAR ONCE
Status: COMPLETED | OUTPATIENT
Start: 2024-01-11 | End: 2024-01-11

## 2024-01-11 RX ORDER — LEVETIRACETAM 500 MG/1
500 TABLET ORAL EVERY 12 HOURS SCHEDULED
Qty: 14 TABLET | Refills: 0 | Status: SHIPPED | OUTPATIENT
Start: 2024-01-11

## 2024-01-11 RX ADMIN — LORAZEPAM 0.5 MG: 2 INJECTION INTRAMUSCULAR; INTRAVENOUS at 12:19

## 2024-01-11 NOTE — Clinical Note
Joe Milian was seen and treated in our emergency department on 1/11/2024.    No restrictions            Diagnosis:     Joe  .    He may return on this date: 01/13/2024         If you have any questions or concerns, please don't hesitate to call.      Giacomo Romero MD    ______________________________           _______________          _______________  Hospital Representative                              Date                                Time

## 2024-01-11 NOTE — ED PROVIDER NOTES
History  Chief Complaint   Patient presents with    Seizure - Prior Hx Of     C/o Seizure at work, denies losing consciousness or hitting his head. Not taking seizure medications.      [facility] has [facility] of seizure disorder he is noncompliant does not take any medications he was offered medication last visit a month ago refused apparently had a seizure today was brought to the ER he denies any oral lacerations any incontinence no fever no chest pain or shortness of breath no weakness or numbness extremity in the difficulty saying he is not postictal currently  Patient denies any illicit drug use no stopping of any recent medications denies any alcohol          Prior to Admission Medications   Prescriptions Last Dose Informant Patient Reported? Taking?   LYRICA 50 MG capsule   Yes No   Sig: Take 50 mg by mouth 2 (two) times a day   VENTOLIN  (90 Base) MCG/ACT inhaler   Yes No   Sig: as needed   fluticasone (FLONASE) 50 mcg/act nasal spray   No No   Si spray into each nostril daily   loratadine (CLARITIN) 10 mg tablet   No No   Sig: Take 1 tablet (10 mg total) by mouth daily   naproxen (NAPROSYN) 250 mg tablet  Self Yes No   Sig: Take 250 mg by mouth 2 (two) times a day with meals      Facility-Administered Medications: None       Past Medical History:   Diagnosis Date    Asthma     Depression     Seizure (HCC)        History reviewed. No pertinent surgical history.    Family History   Problem Relation Age of Onset    Mental illness Mother     Mental illness Sister      I have reviewed and agree with the history as documented.    E-Cigarette/Vaping    E-Cigarette Use Never User      E-Cigarette/Vaping Substances     Social History     Tobacco Use    Smoking status: Every Day     Current packs/day: 0.50     Types: Cigarettes    Smokeless tobacco: Current   Vaping Use    Vaping status: Never Used   Substance Use Topics    Alcohol use: Not Currently    Drug use:  Not Currently     Types: Marijuana       Review of Systems   Constitutional:  Negative for chills and fever.   HENT:  Negative for trouble swallowing.    Eyes:  Negative for photophobia.   Respiratory:  Negative for chest tightness and shortness of breath.    Cardiovascular:  Negative for chest pain and palpitations.   Gastrointestinal:  Negative for abdominal pain and vomiting.   Musculoskeletal:  Negative for arthralgias.   Neurological:  Positive for seizures. Negative for dizziness and syncope.   Psychiatric/Behavioral:  Negative for confusion.        Physical Exam  Physical Exam  Constitutional:       Appearance: Normal appearance.   HENT:      Head: Normocephalic and atraumatic.   Eyes:      Extraocular Movements: Extraocular movements intact.      Conjunctiva/sclera: Conjunctivae normal.      Pupils: Pupils are equal, round, and reactive to light.   Cardiovascular:      Rate and Rhythm: Normal rate and regular rhythm.      Heart sounds: No murmur heard.  Pulmonary:      Effort: No respiratory distress.   Abdominal:      General: There is no distension.      Tenderness: There is no abdominal tenderness.   Musculoskeletal:         General: Normal range of motion.      Cervical back: Normal range of motion and neck supple.   Skin:     General: Skin is warm and dry.   Neurological:      General: No focal deficit present.      Mental Status: He is alert and oriented to person, place, and time.      Cranial Nerves: No cranial nerve deficit.      Sensory: No sensory deficit.      Motor: No weakness.   Psychiatric:         Mood and Affect: Mood normal.         Vital Signs  ED Triage Vitals   Temperature Pulse Respirations Blood Pressure SpO2   01/11/24 1107 01/11/24 1107 01/11/24 1107 01/11/24 1107 01/11/24 1107   98.7 °F (37.1 °C) 80 18 157/92 98 %      Temp Source Heart Rate Source Patient Position - Orthostatic VS BP Location FiO2 (%)   01/11/24 1107 01/11/24 1107 01/11/24 1107 01/11/24 1107 --   Oral Monitor  Sitting Left arm       Pain Score       01/11/24 1117       8           Vitals:    01/11/24 1107 01/11/24 1305 01/11/24 1421   BP: 157/92 158/88 158/57   Pulse: 80 84 84   Patient Position - Orthostatic VS: Sitting Lying Lying         Visual Acuity  Visual Acuity      Flowsheet Row Most Recent Value   L Pupil Size (mm) 2   R Pupil Size (mm) 2            ED Medications  Medications   LORazepam (ATIVAN) injection 0.5 mg (0.5 mg Intravenous Given 1/11/24 1219)       Diagnostic Studies  Results Reviewed       Procedure Component Value Units Date/Time    Comprehensive metabolic panel [[id number]] Collected: 01/11/24 1219    Lab Status: Final result Specimen: Blood from Arm, Right Updated: 01/11/24 1254     Sodium 138 mmol/L      Potassium 3.8 mmol/L      Chloride 100 mmol/L      CO2 29 mmol/L      ANION GAP 9 mmol/L      BUN 11 mg/dL      Creatinine 0.88 mg/dL      Glucose 75 mg/dL      Calcium 9.3 mg/dL      AST 28 U/L      ALT 40 U/L      Alkaline Phosphatase 54 U/L      Total Protein 7.2 g/dL      Albumin 4.4 g/dL      Total Bilirubin 0.40 mg/dL      eGFR 114 ml/min/1.73sq m     Narrative:      National Kidney Disease Foundation guidelines for Chronic Kidney Disease (CKD):     Stage 1 with normal or high GFR (GFR > 90 mL/min/1.73 square meters)    Stage 2 Mild CKD (GFR = 60-89 mL/min/1.73 square meters)    Stage 3A Moderate CKD (GFR = 45-59 mL/min/1.73 square meters)    Stage 3B Moderate CKD (GFR = 30-44 mL/min/1.73 square meters)    Stage 4 Severe CKD (GFR = 15-29 mL/min/1.73 square meters)    Stage 5 End Stage CKD (GFR <15 mL/min/1.73 square meters)  Note: GFR calculation is accurate only with a steady state creatinine    CBC and differential [[id number]] Collected: 01/11/24 1219    Lab Status: Final result Specimen: Blood from Arm, Right Updated: 01/11/24 1225     WBC 9.58 Thousand/uL      RBC 5.45 Million/uL      Hemoglobin 16.3 g/dL      Hematocrit 48.6 %      MCV 89 fL      MCH 29.9 pg      MCHC 33.5 g/dL      RDW  12.8 %      MPV 9.9 fL      Platelets 214 Thousands/uL      nRBC 0 /100 WBCs      Neutrophils Relative 56 %      Immat GRANS % 0 %      Lymphocytes Relative 34 %      Monocytes Relative 6 %      Eosinophils Relative 3 %      Basophils Relative 1 %      Neutrophils Absolute 5.33 Thousands/µL      Immature Grans Absolute 0.04 Thousand/uL      Lymphocytes Absolute 3.25 Thousands/µL      Monocytes Absolute 0.61 Thousand/µL      Eosinophils Absolute 0.28 Thousand/µL      Basophils Absolute 0.07 Thousands/µL     Fingerstick Glucose (POCT) [[id number]]  (Normal) Collected: 01/11/24 1205    Lab Status: Final result Updated: 01/11/24 1207     POC Glucose 77 mg/dl                    CT head without contrast   Final Result by Rich Simons MD (01/11 1351)      1.  No acute intracranial hemorrhage, mass effect or edema.   2.  Cannot reliably evaluate the abnormality in the right brachium pontis seen in prior MRI due to CT modality limitation.   3.  Mild bilateral mastoid effusion and suspected small fluid within the right middle ear. Correlate for otomastoiditis.   4.  Chiari I malformation                  Resident: VICTORINO GANT I, the attending radiologist, have reviewed the images and agree with the final report above.      Workstation performed: [id number]                    Procedures  ECG 12 Lead Documentation Only    Date/Time: 1/11/2024 12:15 PM    Performed by: Giacomo Romero MD  Authorized by: Giacomo Romero MD    Indications / Diagnosis:  Seizure  ECG reviewed by me, the ED Provider: yes    Patient location:  ED  Previous ECG:     Previous ECG:  Unavailable  Interpretation:     Interpretation: normal    Rate:     ECG rate:  68    ECG rate assessment: normal    Rhythm:     Rhythm: sinus rhythm    Ectopy:     Ectopy: none    QRS:     QRS axis:  Normal  ST segments:     ST segments:  Normal           ED Course  ED Course as of 01/11/24 1513   Thu Jan 11, 2024   1255 Patient remained stable no further seizures                                SBIRT 20yo+      Flowsheet Row Most Recent Value   Initial Alcohol Screen: US AUDIT-C     1. How often do you have a drink containing alcohol? 0 Filed at: 01/11/2024 1114   2. How many drinks containing alcohol do you have on a typical day you are drinking?  0 Filed at: 01/11/2024 1114   3a. Male UNDER 65: How often do you have five or more drinks on one occasion? 0 Filed at: 01/11/2024 1114   Audit-C Score 0 Filed at: 01/11/2024 1114   TRE: How many times in the past year have you...    Used an illegal drug or used a prescription medication for non-medical reasons? Never Filed at: 01/11/2024 1114                      Medical Decision Making  Patient workup is negative negative blood work CT is negative unchanged  Patient has not had a seizure herE NEURO INTACT patient has neurology follow-up scheduled    Amount and/or Complexity of Data Reviewed  Labs: ordered.  Radiology: ordered.    Risk  Prescription drug management.             Disposition  Final diagnoses:   Seizure (HCC)     Time reflects when diagnosis was documented in both MDM as applicable and the Disposition within this note       Time User Action Codes Description Comment    1/11/2024  2:10 PM Giacomo Romero Add [R56.9] Seizure (HCC)           ED Disposition       ED Disposition   Discharge    Condition   Stable    Date/Time   Thu Jan 11, 2024 1410    Comment   Joe Milian discharge to home/self care.                   Follow-up Information       Follow up With Specialties Details Why Contact Info Additional Information    RICK Benjamin Nurse Practitioner In 2 days  [address] [facility] [address]  [phone]       [facility] In 3 days  [address] [place] [address], [address] [address] [phone] [facility], [address] [place] [address], [place] [address], [place], [place], [address] [phone]             Discharge Medication List as of 1/11/2024  2:13 PM        CONTINUE these medications which have NOT CHANGED    Details   fluticasone (FLONASE) 50 mcg/act nasal spray 1 spray into each nostril daily, Starting Tue 12/12/2023, Normal      loratadine (CLARITIN) 10 mg tablet Take 1 tablet (10 mg total) by mouth daily, Starting Tue 12/12/2023, Normal      LYRICA 50 MG capsule Take 50 mg by mouth 2 (two) times a day, Starting Wed 4/10/2019, Historical Med      naproxen (NAPROSYN) 250 mg tablet Take 250 mg by mouth 2 (two) times a day with meals, Historical Med      VENTOLIN  (90 Base) MCG/ACT inhaler as needed, Starting Mon 3/25/2019, Historical Med             No discharge procedures on file.    PDMP Review       None            ED Provider  Electronically Signed by             Giacomo Romero MD  01/11/24 5599

## 2024-01-11 NOTE — Clinical Note
Joe Milian was seen and treated in our emergency department on 1/11/2024.                Diagnosis:     Joe  .    He may return on this date: 01/13/2024         If you have any questions or concerns, please don't hesitate to call.      Giacomo Romero MD    ______________________________           _______________          _______________  Hospital Representative                              Date                                Time

## 2024-05-10 ENCOUNTER — HOSPITAL ENCOUNTER (EMERGENCY)
Facility: HOSPITAL | Age: 34
Discharge: HOME | End: 2024-05-10
Attending: EMERGENCY MEDICINE
Payer: COMMERCIAL

## 2024-05-10 VITALS
SYSTOLIC BLOOD PRESSURE: 149 MMHG | OXYGEN SATURATION: 96 % | RESPIRATION RATE: 18 BRPM | DIASTOLIC BLOOD PRESSURE: 80 MMHG | HEART RATE: 95 BPM | TEMPERATURE: 96.7 F

## 2024-05-10 DIAGNOSIS — R03.0 ELEVATED BLOOD PRESSURE READING: ICD-10-CM

## 2024-05-10 DIAGNOSIS — R79.89 POSITIVE D DIMER: ICD-10-CM

## 2024-05-10 DIAGNOSIS — M79.662 PAIN OF LEFT CALF: Primary | ICD-10-CM

## 2024-05-10 LAB — D DIMER PPP FEU-MCNC: 0.72 UG/ML FEU

## 2024-05-10 PROCEDURE — 36415 COLL VENOUS BLD VENIPUNCTURE: CPT

## 2024-05-10 PROCEDURE — 85379 FIBRIN DEGRADATION QUANT: CPT

## 2024-05-10 PROCEDURE — 99284 EMERGENCY DEPT VISIT MOD MDM: CPT | Performed by: EMERGENCY MEDICINE

## 2024-05-10 RX ORDER — ENOXAPARIN SODIUM 100 MG/ML
80 INJECTION SUBCUTANEOUS ONCE
Status: COMPLETED | OUTPATIENT
Start: 2024-05-10 | End: 2024-05-10

## 2024-05-10 RX ORDER — ENOXAPARIN SODIUM 100 MG/ML
60 INJECTION SUBCUTANEOUS ONCE
Status: COMPLETED | OUTPATIENT
Start: 2024-05-10 | End: 2024-05-10

## 2024-05-10 RX ORDER — IBUPROFEN 600 MG/1
600 TABLET ORAL ONCE
Status: COMPLETED | OUTPATIENT
Start: 2024-05-10 | End: 2024-05-10

## 2024-05-10 RX ORDER — AMLODIPINE BESYLATE 5 MG/1
5 TABLET ORAL ONCE
Status: COMPLETED | OUTPATIENT
Start: 2024-05-10 | End: 2024-05-10

## 2024-05-10 RX ORDER — AMLODIPINE BESYLATE 5 MG/1
5 TABLET ORAL DAILY
Qty: 30 TABLET | Refills: 0 | Status: SHIPPED | OUTPATIENT
Start: 2024-05-11 | End: 2024-06-10

## 2024-05-10 RX ORDER — ENOXAPARIN SODIUM 150 MG/ML
1 INJECTION SUBCUTANEOUS ONCE
Status: DISCONTINUED | OUTPATIENT
Start: 2024-05-10 | End: 2024-05-10 | Stop reason: SDUPTHER

## 2024-05-10 RX ORDER — ENOXAPARIN SODIUM 100 MG/ML
40 INJECTION SUBCUTANEOUS ONCE
Status: DISCONTINUED | OUTPATIENT
Start: 2024-05-10 | End: 2024-05-10

## 2024-05-10 RX ADMIN — AMLODIPINE BESYLATE 5 MG: 5 TABLET ORAL at 20:16

## 2024-05-10 RX ADMIN — ENOXAPARIN SODIUM 60 MG: 60 INJECTION SUBCUTANEOUS at 21:27

## 2024-05-10 RX ADMIN — ENOXAPARIN SODIUM 80 MG: 80 INJECTION SUBCUTANEOUS at 21:24

## 2024-05-10 RX ADMIN — IBUPROFEN 600 MG: 600 TABLET, FILM COATED ORAL at 20:16

## 2024-05-10 NOTE — ED PROVIDER NOTES
History  Chief Complaint   Patient presents with    Leg Pain     Pt states that he started with left calf pain 3 days ago. Denies any injury to it.      33y.o M presenting for left calf pain. 3 days ago Pt woke up with sudden onset left calf pain and swelling. Describes the pain as sharp and worse with movement. Has not taken anything for relief. Denies rash, CP, SOB, recent travel, recent surgeries, recent trauma, fever, h/o DVT. States he was recently hospitalized 1 month ago for depression.      History provided by:  Patient      Prior to Admission Medications   Prescriptions Last Dose Informant Patient Reported? Taking?   LYRICA 50 MG capsule   Yes No   Sig: Take 50 mg by mouth 2 (two) times a day   VENTOLIN  (90 Base) MCG/ACT inhaler   Yes No   Sig: as needed   fluticasone (FLONASE) 50 mcg/act nasal spray   No No   Si spray into each nostril daily   levETIRAcetam (Keppra) 500 mg tablet   No No   Sig: Take 1 tablet (500 mg total) by mouth every 12 (twelve) hours   loratadine (CLARITIN) 10 mg tablet   No No   Sig: Take 1 tablet (10 mg total) by mouth daily   naproxen (NAPROSYN) 250 mg tablet  Self Yes No   Sig: Take 250 mg by mouth 2 (two) times a day with meals      Facility-Administered Medications: None       Past Medical History:   Diagnosis Date    Asthma     Depression     Seizure (HCC)        History reviewed. No pertinent surgical history.    Family History   Problem Relation Age of Onset    Mental illness Mother     Mental illness Sister      I have reviewed and agree with the history as documented.    E-Cigarette/Vaping    E-Cigarette Use Never User      E-Cigarette/Vaping Substances     Social History     Tobacco Use    Smoking status: Every Day     Current packs/day: 0.50     Types: Cigarettes    Smokeless tobacco: Current   Vaping Use    Vaping status: Never Used   Substance Use Topics    Alcohol use: Not Currently    Drug use: Not Currently     Types: Marijuana        Review of Systems    Constitutional:  Negative for fever.   Respiratory:  Negative for shortness of breath.    Cardiovascular:  Positive for leg swelling. Negative for chest pain and palpitations.   Musculoskeletal:  Positive for myalgias.   Skin:  Negative for rash.       Physical Exam  ED Triage Vitals [05/10/24 1941]   Temperature Pulse Respirations Blood Pressure SpO2   (!) 96.7 °F (35.9 °C) 102 18 (!) 177/115 97 %      Temp Source Heart Rate Source Patient Position - Orthostatic VS BP Location FiO2 (%)   Temporal Monitor Sitting Right arm --      Pain Score       9             Orthostatic Vital Signs  Vitals:    05/10/24 1941 05/10/24 2016 05/10/24 2020 05/10/24 2029   BP: (!) 177/115 (!) 174/93  149/80   Pulse: 102  92 95   Patient Position - Orthostatic VS: Sitting          Physical Exam  Constitutional:       Appearance: Normal appearance.   Cardiovascular:      Rate and Rhythm: Normal rate and regular rhythm.      Pulses: Normal pulses.      Heart sounds: Normal heart sounds.   Pulmonary:      Effort: Pulmonary effort is normal.      Breath sounds: Normal breath sounds.   Musculoskeletal:         General: Tenderness (left posterior calf ttp) present.      Right lower leg: No edema.      Left lower leg: Edema present.   Skin:     General: Skin is warm and dry.      Capillary Refill: Capillary refill takes less than 2 seconds.   Neurological:      General: No focal deficit present.      Mental Status: He is alert and oriented to person, place, and time.         ED Medications  Medications   ibuprofen (MOTRIN) tablet 600 mg (600 mg Oral Given 5/10/24 2016)   amLODIPine (NORVASC) tablet 5 mg (5 mg Oral Given 5/10/24 2016)   enoxaparin (LOVENOX) subcutaneous injection 80 mg (80 mg Subcutaneous Given 5/10/24 2124)     And   enoxaparin (LOVENOX) subcutaneous injection 60 mg (60 mg Subcutaneous Given 5/10/24 2127)       Diagnostic Studies  Results Reviewed       Procedure Component Value Units Date/Time    D-dimer, quantitative  [[id number]]  (Abnormal) Collected: 05/10/24 2029    Lab Status: Final result Specimen: Blood from Arm, Right Updated: 05/10/24 2046     D-Dimer, Quant 0.72 ug/ml FEU                    VAS VENOUS DUPLEX -LOWER LIMB UNILATERAL    (Results Pending)         Procedures  Procedures      ED Course  ED Course as of 05/10/24 2140   Fri May 10, 2024   2020 Ddx includes but not limited to DVT, muscle strain; less likely cellulitis given NO rash, NO fever   2052 D-Dimer, Quant(!): 0.72  Will treat with SubQ lovenox and have Pt f/u to have ultrasound done to r/o DVT.                             SBIRT 22yo+      Flowsheet Row Most Recent Value   Initial Alcohol Screen: US AUDIT-C     1. How often do you have a drink containing alcohol? 0 Filed at: 05/10/2024 1940   2. How many drinks containing alcohol do you have on a typical day you are drinking?  0 Filed at: 05/10/2024 1940   3a. Male UNDER 65: How often do you have five or more drinks on one occasion? 0 Filed at: 05/10/2024 1940   3b. FEMALE Any Age, or MALE 65+: How often do you have 4 or more drinks on one occassion? 0 Filed at: 05/10/2024 1940   Audit-C Score 0 Filed at: 05/10/2024 1940   TRE: How many times in the past year have you...    Used an illegal drug or used a prescription medication for non-medical reasons? Never Filed at: 05/10/2024 1940                  Medical Decision Making  See ED course for additional MDM. Pt was provided 1mg/kg subQ heparin for empirical treatment of a DVT. I instructed Pt to follow up for ultrasound tomorrow to r/o DVT. Pt was diagnosed with HTN, but d/t moving has been unable to obtain his medications. Will treat for HTN and have Pt follow up with his PCP for further management. Strict return precautions discussed.    Amount and/or Complexity of Data Reviewed  Labs: ordered. Decision-making details documented in ED Course.    Risk  Prescription drug management.          Disposition  Final diagnoses:   Elevated blood pressure reading    Positive D dimer   Pain of left calf     Time reflects when diagnosis was documented in both MDM as applicable and the Disposition within this note       Time User Action Codes Description Comment    5/10/2024  8:08 PM Davey Werner Add [M79.662] Pain of left calf     5/10/2024  8:13 PM Davey Werner Add [R03.0] Elevated blood pressure reading     5/10/2024  8:55 PM KoDavey shin Modify [R03.0] Elevated blood pressure reading     5/10/2024  8:55 PM KoDavey shin Remove [M79.662] Pain of left calf     5/10/2024  8:55 PM KotilDavey Add [I82.409] DVT (deep venous thrombosis) ([facility]ly [facility])     5/10/2024  8:56 PM KoDavey shin Modify [R03.0] Elevated blood pressure reading     5/10/2024  8:56 PM KoDavey shin Modify [I82.409] DVT (deep venous thrombosis) (Former[facility])     5/10/2024  8:56 PM KoDavey shin Add [R79.89] Positive D dimer     5/10/2024  8:56 PM Koaleida, Davey Add [M79.662] Pain of left calf     5/10/2024  8:57 PM KoDavey shin Modify [R03.0] Elevated blood pressure reading     5/10/2024  8:57 PM Davey Werner Remove [I82.409] DVT (deep venous thrombosis) (Former[facility])     5/10/2024  8:57 PM Koaleida, Davey Modify [R03.0] Elevated blood pressure reading     5/10/2024  8:57 PM Davey Werner Modify [M79.662] Pain of left calf           ED Disposition       ED Disposition   Discharge    Condition   Stable    Date/Time   Fri May 10, 2024 2008    Comment   Joe Milian discharge to home/self care.                   Follow-up Information       Follow up With Specialties Details Why Contact Info Additional Information    [facility] Emergency Medicine Go to  If symptoms worsen [address] [facility] [address]  [phone] [facility], [address], [place], [address]    [facility] Schedule an appointment as soon as possible for a visit   [address] [place] [address]  [address]  [phone] [facility], [address], [place], [address]   [phone]            Discharge Medication List as of 5/10/2024  9:05 PM        START taking these medications    Details   amLODIPine (NORVASC) 5 mg tablet Take 1 tablet (5 mg total) by mouth daily Do not start before May 11, 2024., Starting Sat 5/11/2024, Until Mon 6/10/2024, Print           CONTINUE these medications which have NOT CHANGED    Details   fluticasone (FLONASE) 50 mcg/act nasal spray 1 spray into each nostril daily, Starting Tue 12/12/2023, Normal      levETIRAcetam (Keppra) 500 mg tablet Take 1 tablet (500 mg total) by mouth every 12 (twelve) hours, Starting Thu 1/11/2024, Normal      loratadine (CLARITIN) 10 mg tablet Take 1 tablet (10 mg total) by mouth daily, Starting Tue 12/12/2023, Normal      LYRICA 50 MG capsule Take 50 mg by mouth 2 (two) times a day, Starting Wed 4/10/2019, Historical Med      naproxen (NAPROSYN) 250 mg tablet Take 250 mg by mouth 2 (two) times a day with meals, Historical Med      VENTOLIN  (90 Base) MCG/ACT inhaler as needed, Starting Mon 3/25/2019, Historical Med           Outpatient Discharge Orders   Ambulatory Referral to Family Practice   Standing Status: Future Standing Exp. Date: 05/10/25      VAS VENOUS DUPLEX -LOWER LIMB UNILATERAL   Standing Status: Future Standing Exp. Date: 05/10/28       PDMP Review       None             ED Provider  Attending physically available and evaluated Joe Caal I managed the patient along with the ED Attending.    Electronically Signed by           Davey Werner MD  05/10/24 0490

## 2024-05-11 ENCOUNTER — HOSPITAL ENCOUNTER (OUTPATIENT)
Dept: NON INVASIVE DIAGNOSTICS | Facility: HOSPITAL | Age: 34
Discharge: HOME | End: 2024-05-11
Payer: MEDICARE

## 2024-05-11 ENCOUNTER — HOSPITAL ENCOUNTER (EMERGENCY)
Facility: HOSPITAL | Age: 34
Discharge: HOME | End: 2024-05-11
Attending: EMERGENCY MEDICINE
Payer: MEDICARE

## 2024-05-11 ENCOUNTER — APPOINTMENT (EMERGENCY)
Dept: CT IMAGING | Facility: HOSPITAL | Age: 34
End: 2024-05-11
Payer: MEDICARE

## 2024-05-11 VITALS
HEART RATE: 82 BPM | OXYGEN SATURATION: 95 % | HEIGHT: 68 IN | RESPIRATION RATE: 22 BRPM | WEIGHT: 309.08 LBS | SYSTOLIC BLOOD PRESSURE: 142 MMHG | TEMPERATURE: 97.2 F | DIASTOLIC BLOOD PRESSURE: 78 MMHG | BODY MASS INDEX: 46.84 KG/M2

## 2024-05-11 DIAGNOSIS — M79.662 PAIN OF LEFT CALF: ICD-10-CM

## 2024-05-11 DIAGNOSIS — I82.402 ACUTE DEEP VEIN THROMBOSIS (DVT) OF LEFT LOWER EXTREMITY (HCC): Primary | ICD-10-CM

## 2024-05-11 DIAGNOSIS — R79.89 POSITIVE D DIMER: ICD-10-CM

## 2024-05-11 LAB
ALBUMIN SERPL BCP-MCNC: 4 G/DL (ref 3.5–5)
ALP SERPL-CCNC: 68 U/L (ref 34–104)
ALT SERPL W P-5'-P-CCNC: 29 U/L (ref 7–52)
ANION GAP SERPL CALCULATED.3IONS-SCNC: 7 MMOL/L (ref 4–13)
APTT PPP: 35 SECONDS (ref 23–37)
AST SERPL W P-5'-P-CCNC: 17 U/L (ref 13–39)
BASOPHILS # BLD AUTO: 0.09 THOUSANDS/ÂΜL (ref 0–0.1)
BASOPHILS NFR BLD AUTO: 1 % (ref 0–1)
BILIRUB SERPL-MCNC: 0.34 MG/DL (ref 0.2–1)
BNP SERPL-MCNC: 7 PG/ML (ref 0–100)
BUN SERPL-MCNC: 12 MG/DL (ref 5–25)
CALCIUM SERPL-MCNC: 9.6 MG/DL (ref 8.4–10.2)
CARDIAC TROPONIN I PNL SERPL HS: 4 NG/L
CHLORIDE SERPL-SCNC: 102 MMOL/L (ref 96–108)
CO2 SERPL-SCNC: 29 MMOL/L (ref 21–32)
CREAT SERPL-MCNC: 0.77 MG/DL (ref 0.6–1.3)
DEPRECATED AT III PPP: 79 % OF NORMAL (ref 92–136)
EOSINOPHIL # BLD AUTO: 0.28 THOUSAND/ÂΜL (ref 0–0.61)
EOSINOPHIL NFR BLD AUTO: 3 % (ref 0–6)
ERYTHROCYTE [DISTWIDTH] IN BLOOD BY AUTOMATED COUNT: 12.5 % (ref 11.6–15.1)
GFR SERPL CREATININE-BSD FRML MDRD: 120 ML/MIN/1.73SQ M
GLUCOSE SERPL-MCNC: 82 MG/DL (ref 65–140)
HCT VFR BLD AUTO: 48.4 % (ref 36.5–49.3)
HGB BLD-MCNC: 16.2 G/DL (ref 12–17)
IMM GRANULOCYTES # BLD AUTO: 0.04 THOUSAND/UL (ref 0–0.2)
IMM GRANULOCYTES NFR BLD AUTO: 0 % (ref 0–2)
INR PPP: 0.96 (ref 0.84–1.19)
LYMPHOCYTES # BLD AUTO: 2.47 THOUSANDS/ÂΜL (ref 0.6–4.47)
LYMPHOCYTES NFR BLD AUTO: 24 % (ref 14–44)
MCH RBC QN AUTO: 29.3 PG (ref 26.8–34.3)
MCHC RBC AUTO-ENTMCNC: 33.5 G/DL (ref 31.4–37.4)
MCV RBC AUTO: 88 FL (ref 82–98)
MONOCYTES # BLD AUTO: 0.78 THOUSAND/ÂΜL (ref 0.17–1.22)
MONOCYTES NFR BLD AUTO: 8 % (ref 4–12)
NEUTROPHILS # BLD AUTO: 6.64 THOUSANDS/ÂΜL (ref 1.85–7.62)
NEUTS SEG NFR BLD AUTO: 64 % (ref 43–75)
NRBC BLD AUTO-RTO: 0 /100 WBCS
PLATELET # BLD AUTO: 201 THOUSANDS/UL (ref 149–390)
PMV BLD AUTO: 9.7 FL (ref 8.9–12.7)
POTASSIUM SERPL-SCNC: 3.8 MMOL/L (ref 3.5–5.3)
PROT SERPL-MCNC: 6.9 G/DL (ref 6.4–8.4)
PROTHROMBIN TIME: 12.7 SECONDS (ref 11.6–14.5)
RBC # BLD AUTO: 5.53 MILLION/UL (ref 3.88–5.62)
SODIUM SERPL-SCNC: 138 MMOL/L (ref 135–147)
WBC # BLD AUTO: 10.3 THOUSAND/UL (ref 4.31–10.16)

## 2024-05-11 PROCEDURE — 85306 CLOT INHIBIT PROT S FREE: CPT | Performed by: PHYSICIAN ASSISTANT

## 2024-05-11 PROCEDURE — 85730 THROMBOPLASTIN TIME PARTIAL: CPT | Performed by: PHYSICIAN ASSISTANT

## 2024-05-11 PROCEDURE — 93971 EXTREMITY STUDY: CPT | Performed by: SURGERY

## 2024-05-11 PROCEDURE — 85613 RUSSELL VIPER VENOM DILUTED: CPT | Performed by: PHYSICIAN ASSISTANT

## 2024-05-11 PROCEDURE — 85732 THROMBOPLASTIN TIME PARTIAL: CPT | Performed by: PHYSICIAN ASSISTANT

## 2024-05-11 PROCEDURE — 86147 CARDIOLIPIN ANTIBODY EA IG: CPT | Performed by: PHYSICIAN ASSISTANT

## 2024-05-11 PROCEDURE — 85305 CLOT INHIBIT PROT S TOTAL: CPT | Performed by: PHYSICIAN ASSISTANT

## 2024-05-11 PROCEDURE — 85670 THROMBIN TIME PLASMA: CPT | Performed by: PHYSICIAN ASSISTANT

## 2024-05-11 PROCEDURE — 80053 COMPREHEN METABOLIC PANEL: CPT | Performed by: PHYSICIAN ASSISTANT

## 2024-05-11 PROCEDURE — 85303 CLOT INHIBIT PROT C ACTIVITY: CPT | Performed by: PHYSICIAN ASSISTANT

## 2024-05-11 PROCEDURE — 99284 EMERGENCY DEPT VISIT MOD MDM: CPT

## 2024-05-11 PROCEDURE — 85300 ANTITHROMBIN III ACTIVITY: CPT | Performed by: PHYSICIAN ASSISTANT

## 2024-05-11 PROCEDURE — 93005 ELECTROCARDIOGRAM TRACING: CPT

## 2024-05-11 PROCEDURE — 86146 BETA-2 GLYCOPROTEIN ANTIBODY: CPT | Performed by: PHYSICIAN ASSISTANT

## 2024-05-11 PROCEDURE — 93971 EXTREMITY STUDY: CPT

## 2024-05-11 PROCEDURE — 85598 HEXAGNAL PHOSPH PLTLT NEUTRL: CPT | Performed by: PHYSICIAN ASSISTANT

## 2024-05-11 PROCEDURE — 36415 COLL VENOUS BLD VENIPUNCTURE: CPT | Performed by: PHYSICIAN ASSISTANT

## 2024-05-11 PROCEDURE — 85705 THROMBOPLASTIN INHIBITION: CPT | Performed by: PHYSICIAN ASSISTANT

## 2024-05-11 PROCEDURE — 85610 PROTHROMBIN TIME: CPT | Performed by: PHYSICIAN ASSISTANT

## 2024-05-11 PROCEDURE — 85025 COMPLETE CBC W/AUTO DIFF WBC: CPT | Performed by: PHYSICIAN ASSISTANT

## 2024-05-11 PROCEDURE — 99285 EMERGENCY DEPT VISIT HI MDM: CPT | Performed by: PHYSICIAN ASSISTANT

## 2024-05-11 PROCEDURE — 84484 ASSAY OF TROPONIN QUANT: CPT | Performed by: PHYSICIAN ASSISTANT

## 2024-05-11 PROCEDURE — 71275 CT ANGIOGRAPHY CHEST: CPT

## 2024-05-11 PROCEDURE — 83880 ASSAY OF NATRIURETIC PEPTIDE: CPT | Performed by: PHYSICIAN ASSISTANT

## 2024-05-11 RX ADMIN — APIXABAN 10 MG: 5 TABLET, FILM COATED ORAL at 13:09

## 2024-05-11 RX ADMIN — IOHEXOL 85 ML: 350 INJECTION, SOLUTION INTRAVENOUS at 12:14

## 2024-05-11 NOTE — ED PROVIDER NOTES
History  Chief Complaint   Patient presents with    Leg Pain     Patient was sent from vascular and was told he has a blood clot in his left lower leg. Patient reports pain at 8/10      33 year old male with PMH asthma, depression, seizure disorder presenting ambulatory for evaluation after having venous doppler.  Pt notes he was here yesterday due to pain in his left calf.  He had an elevated d dimer.  He received a dose of lovenox and referred for study today.  He was notified that he has DVT in the left calf.  He is now here for further evaluation.  He notes the pain started a few days ago.  Denies injury/trauma.  Pain located in left calf, does not radiate.  No reported aggravating or alleviating factors.  Tried ibuprofen without relief.  Denies numbness, tingling, weakness.  Denies fever, chills, cough, congestion or recent illness.  Denies chest pain.  He notes occasional shortness of breath which he states is worse with exertion and has contributed to his asthma.    He is a current smoker.  Denies dizziness, lightheadedness, headache, visual disturbance.  No syncope.  Denies N/V/D, abdominal pain.  He has a good appetite.  He tells me he was recently homeless  but now moved in with his sister and feels he has good support.  No personal h/o DVT/PE.  No known family history.  No recent surgery.  No immobilization.  No recent travel or prolonged time in car or flight.  He has no known clotting issues.      History provided by:  Patient and medical records   used: No    Leg Pain  Location:  Leg  Time since incident:  4 days  Injury: no    Leg location:  L lower leg  Pain details:     Radiates to:  Does not radiate  Chronicity:  New  Prior injury to area:  No  Relieved by:  Nothing  Worsened by:  Nothing  Ineffective treatments:  NSAIDs  Associated symptoms: swelling    Associated symptoms: no back pain, no decreased ROM, no fatigue, no fever, no neck pain, no numbness and no tingling    Risk  factors: no concern for non-accidental trauma, no frequent fractures, no known bone disorder and no recent illness        Prior to Admission Medications   Prescriptions Last Dose Informant Patient Reported? Taking?   LYRICA 50 MG capsule Not Taking  Yes No   Sig: Take 50 mg by mouth 2 (two) times a day   Patient not taking: Reported on 2024   VENTOLIN  (90 Base) MCG/ACT inhaler Not Taking  Yes No   Sig: as needed   Patient not taking: Reported on 2024   amLODIPine (NORVASC) 5 mg tablet 5/10/2024  No Yes   Sig: Take 1 tablet (5 mg total) by mouth daily Do not start before May 11, 2024.   fluticasone (FLONASE) 50 mcg/act nasal spray Not Taking  No No   Si spray into each nostril daily   Patient not taking: Reported on 2024   levETIRAcetam (Keppra) 500 mg tablet Not Taking  No No   Sig: Take 1 tablet (500 mg total) by mouth every 12 (twelve) hours   Patient not taking: Reported on 2024   loratadine (CLARITIN) 10 mg tablet Not Taking  No No   Sig: Take 1 tablet (10 mg total) by mouth daily   Patient not taking: Reported on 2024      Facility-Administered Medications: None       Past Medical History:   Diagnosis Date    Asthma     Depression     Seizure (HCC)        History reviewed. No pertinent surgical history.    Family History   Problem Relation Age of Onset    Mental illness Mother     Mental illness Sister      I have reviewed and agree with the history as documented.    E-Cigarette/Vaping    E-Cigarette Use Never User      E-Cigarette/Vaping Substances     Social History     Tobacco Use    Smoking status: Every Day     Current packs/day: 0.50     Types: Cigarettes    Smokeless tobacco: Current   Vaping Use    Vaping status: Never Used   Substance Use Topics    Alcohol use: Not Currently    Drug use: Not Currently     Types: Marijuana       Review of Systems   Constitutional: Negative.  Negative for chills, fatigue and fever.   HENT: Negative.  Negative for congestion,  rhinorrhea and sore throat.    Eyes: Negative.  Negative for visual disturbance.   Respiratory:  Positive for shortness of breath (pt notes with exertion). Negative for cough and wheezing.    Cardiovascular:  Positive for leg swelling. Negative for chest pain and palpitations.   Gastrointestinal: Negative.  Negative for abdominal pain, diarrhea, nausea and vomiting.   Genitourinary: Negative.  Negative for dysuria, flank pain, frequency and hematuria.   Musculoskeletal:  Positive for myalgias. Negative for back pain and neck pain.   Skin: Negative.  Negative for color change, rash and wound.   Neurological: Negative.  Negative for dizziness, light-headedness, numbness and headaches.   Psychiatric/Behavioral: Negative.     All other systems reviewed and are negative.      Physical Exam  Physical Exam  Vitals and nursing note reviewed.   Constitutional:       General: He is awake. He is not in acute distress.     Appearance: Normal appearance. He is well-developed. He is obese. He is not toxic-appearing or diaphoretic.   HENT:      Head: Normocephalic and atraumatic.      Right Ear: Hearing and external ear normal.      Left Ear: Hearing and external ear normal.      Nose: Nose normal.      Mouth/Throat:      Mouth: Mucous membranes are moist.      Pharynx: Oropharynx is clear. Uvula midline.   Eyes:      General: Lids are normal. No scleral icterus.     Conjunctiva/sclera: Conjunctivae normal.   Neck:      Trachea: Trachea and phonation normal.   Cardiovascular:      Rate and Rhythm: Normal rate and regular rhythm.      Pulses: Normal pulses.           Dorsalis pedis pulses are 2+ on the right side and 2+ on the left side.        Posterior tibial pulses are 2+ on the right side and 2+ on the left side.      Heart sounds: Normal heart sounds, S1 normal and S2 normal.      Comments: Mild tenderness of left medial calf.  No overlying skin changes.  Mild associated swelling.  Pulmonary:      Effort: Pulmonary effort is  normal. No tachypnea or respiratory distress.      Breath sounds: Normal breath sounds. No wheezing, rhonchi or rales.   Abdominal:      General: Bowel sounds are normal. There is no distension.      Palpations: Abdomen is soft.      Tenderness: There is no abdominal tenderness. There is no guarding.   Musculoskeletal:      Right lower leg: No edema.      Left lower leg: Edema present.   Skin:     General: Skin is warm and dry.      Capillary Refill: Capillary refill takes less than 2 seconds.      Findings: No bruising, erythema, rash or wound.   Neurological:      General: No focal deficit present.      Mental Status: He is alert and oriented to person, place, and time.      GCS: GCS eye subscore is 4. GCS verbal subscore is 5. GCS motor subscore is 6.      Cranial Nerves: Cranial nerves 2-12 are intact.      Sensory: Sensation is intact.      Motor: Motor function is intact.      Gait: Gait normal.   Psychiatric:         Mood and Affect: Mood normal.         Speech: Speech normal.         Behavior: Behavior normal. Behavior is cooperative.         Vital Signs  ED Triage Vitals [05/11/24 1056]   Temperature Pulse Respirations Blood Pressure SpO2   (!) 97.2 °F (36.2 °C) 100 18 140/86 93 %      Temp Source Heart Rate Source Patient Position - Orthostatic VS BP Location FiO2 (%)   Temporal Monitor Sitting Left arm --      Pain Score       9           Vitals:    05/11/24 1056 05/11/24 1145 05/11/24 1234   BP: 140/86  142/78   Pulse: 100 82 82   Patient Position - Orthostatic VS: Sitting  Lying         Visual Acuity      ED Medications  Medications   iohexol (OMNIPAQUE) 350 MG/ML injection (MULTI-DOSE) 85 mL (85 mL Intravenous Given 5/11/24 1214)   apixaban (ELIQUIS) tablet 10 mg (10 mg Oral Given 5/11/24 1309)       Diagnostic Studies  Results Reviewed       Procedure Component Value Units Date/Time    HS Troponin I 2hr [[id number]]     Lab Status: No result Specimen: Blood     HS Troponin I 4hr [[id number]]     Lab  Status: No result Specimen: Blood     HS Troponin 0hr (reflex protocol) [[id number]]  (Normal) Collected: 05/11/24 1134    Lab Status: Final result Specimen: Blood from Arm, Right Updated: 05/11/24 1203     hs TnI 0hr 4 ng/L     B-Type Natriuretic Peptide(BNP) [[id number]]  (Normal) Collected: 05/11/24 1134    Lab Status: Final result Specimen: Blood from Arm, Right Updated: 05/11/24 1202     BNP 7 pg/mL     Protein S activity [[id number]] Collected: 05/11/24 1134    Lab Status: In process Specimen: Blood from Arm, Right Updated: 05/11/24 1200    Cardiolipin antibody [[id number]] Collected: 05/11/24 1134    Lab Status: In process Specimen: Blood from Arm, Right Updated: 05/11/24 1200    Lupus anticoagulant [[id number]] Collected: 05/11/24 1134    Lab Status: In process Specimen: Blood from Arm, Right Updated: 05/11/24 1200    Beta-2 glycoprotein antibodies [[id number]] Collected: 05/11/24 1134    Lab Status: In process Specimen: Blood from Arm, Right Updated: 05/11/24 1200    Protein C activity [[id number]] Collected: 05/11/24 1134    Lab Status: In process Specimen: Blood from Arm, Right Updated: 05/11/24 1200    Protein S Antigen, Total & Free [[id number]] Collected: 05/11/24 1134    Lab Status: In process Specimen: Blood from Arm, Right Updated: 05/11/24 1200    Antithrombin III Activity [[id number]] Collected: 05/11/24 1134    Lab Status: In process Specimen: Blood from Arm, Right Updated: 05/11/24 1200    Comprehensive metabolic panel [[id number]] Collected: 05/11/24 1134    Lab Status: Final result Specimen: Blood from Arm, Right Updated: 05/11/24 1159     Sodium 138 mmol/L      Potassium 3.8 mmol/L      Chloride 102 mmol/L      CO2 29 mmol/L      ANION GAP 7 mmol/L      BUN 12 mg/dL      Creatinine 0.77 mg/dL      Glucose 82 mg/dL      Calcium 9.6 mg/dL      AST 17 U/L      ALT 29 U/L      Alkaline Phosphatase 68 U/L      Total Protein 6.9 g/dL      Albumin 4.0 g/dL      Total Bilirubin 0.34 mg/dL      eGFR 120  ml/min/1.73sq m     Narrative:      National Kid[employer] Foundation guidelines for Chronic Kidney Disease (CKD):     Stage 1 with normal or high GFR (GFR > 90 mL/min/1.73 square meters)    Stage 2 Mild CKD (GFR = 60-89 mL/min/1.73 square meters)    Stage 3A Moderate CKD (GFR = 45-59 mL/min/1.73 square meters)    Stage 3B Moderate CKD (GFR = 30-44 mL/min/1.73 square meters)    Stage 4 Severe CKD (GFR = 15-29 mL/min/1.73 square meters)    Stage 5 End Stage CKD (GFR <15 mL/min/1.73 square meters)  Note: GFR calculation is accurate only with a steady state creatinine    Protime-INR [[id number]]  (Normal) Collected: 05/11/24 1134    Lab Status: Final result Specimen: Blood from Arm, Right Updated: 05/11/24 1151     Protime 12.7 seconds      INR 0.96    APTT [[id number]]  (Normal) Collected: 05/11/24 1134    Lab Status: Final result Specimen: Blood from Arm, Right Updated: 05/11/24 1151     PTT 35 seconds     CBC and differential [[id number]]  (Abnormal) Collected: 05/11/24 1134    Lab Status: Final result Specimen: Blood from Arm, Right Updated: 05/11/24 1142     WBC 10.30 Thousand/uL      RBC 5.53 Million/uL      Hemoglobin 16.2 g/dL      Hematocrit 48.4 %      MCV 88 fL      MCH 29.3 pg      MCHC 33.5 g/dL      RDW 12.5 %      MPV 9.7 fL      Platelets 201 Thousands/uL      nRBC 0 /100 WBCs      Segmented % 64 %      Immature Grans % 0 %      Lymphocytes % 24 %      Monocytes % 8 %      Eosinophils Relative 3 %      Basophils Relative 1 %      Absolute Neutrophils 6.64 Thousands/µL      Absolute Immature Grans 0.04 Thousand/uL      Absolute Lymphocytes 2.47 Thousands/µL      Absolute Monocytes 0.78 Thousand/µL      Eosinophils Absolute 0.28 Thousand/µL      Basophils Absolute 0.09 Thousands/µL                    CTA ED chest PE Study   Final Result by Paris Morrison MD (05/11 1244)      No acute pulmonary embolus but the study is mildly limited by motion artifact.      No acute pulmonary disease.      Hepatic  steatosis.            Workstation performed: [id number]                    Procedures  ECG 12 Lead Documentation Only    Date/Time: 5/11/2024 11:26 AM    Performed by: Julia Spring PA-C  Authorized by: Julia Spring PA-C    Indications / Diagnosis:  Dyspnea  ECG reviewed by me, the ED Provider: yes    Patient location:  ED  Previous ECG:     Comparison to cardiac monitor: Yes    Interpretation:     Interpretation: normal    Rate:     ECG rate:  82    ECG rate assessment: normal    Rhythm:     Rhythm: sinus rhythm    Ectopy:     Ectopy: none    QRS:     QRS axis:  Normal    QRS intervals:  Normal  Conduction:     Conduction: normal    ST segments:     ST segments:  Normal  T waves:     T waves: normal    Comments:      , QRS 92, QT//432; no acute ischemic changes.           ED Course  ED Course as of 05/11/24 1335   Sat May 11, 2024   1057 Discussed with vascular tech Isaak, pt is positive for DVT in left calf.   1144 WBC(!): 10.30  Nonspecific minimal elevation   1144 Hemoglobin: 16.2   1144 Platelet Count: 201   1154 POCT INR: 0.96   1154 PROTIME: 12.7   1154 PTT: 35   1203 GLUCOSE: 82   1203 Creatinine: 0.77   1203 BUN: 12   1203 Sodium: 138   1203 Potassium: 3.8   1203 Chloride: 102   1203 Carbon Dioxide: 29   1203 ANION GAP: 7   1203 Calcium: 9.6   1203 AST: 17   1203 ALT: 29   1203 ALK PHOS: 68   1203 Total Protein: 6.9   1203 Albumin: 4.0   1203 Total Bilirubin: 0.34   1203 GFR, Calculated: 120   1203 BNP: 7  Not suggestive of heart failure.   1204 hs TnI 0hr: 4  Not suggestive of ACS   1250 CTA ED chest PE Study  IMPRESSION:     No acute pulmonary embolus but the study is mildly limited by motion artifact.     No acute pulmonary disease.     Hepatic steatosis.   1251 Pt updated on results.  He is resting comfortably no distress.  Reviewed risks/benefits/side effects of anticoagulation.  He is aware he will need refill after this 30 day pill pack and will need to continue  anticoagulation at least 3-6 months and ultimate treatment length to be determined.         D dimer performed yesterday was elevated at 0.72.    HEART Risk Score      Flowsheet Row Most Recent Value   Heart Score Risk Calculator    History 0 Filed at: 05/11/2024 1127   ECG 0 Filed at: 05/11/2024 1127   Age 0 Filed at: 05/11/2024 1127   Risk Factors 1 Filed at: 05/11/2024 1127   Troponin 0 Filed at: 05/11/2024 1127   HEART Score 1 Filed at: 05/11/2024 1127                          SBIRT 22yo+      Flowsheet Row Most Recent Value   Initial Alcohol Screen: US AUDIT-C     1. How often do you have a drink containing alcohol? 0 Filed at: 05/11/2024 1054   2. How many drinks containing alcohol do you have on a typical day you are drinking?  0 Filed at: 05/11/2024 1054   3a. Male UNDER 65: How often do you have five or more drinks on one occasion? 0 Filed at: 05/11/2024 1054   3b. FEMALE Any Age, or MALE 65+: How often do you have 4 or more drinks on one occassion? 0 Filed at: 05/11/2024 1054   Audit-C Score 0 Filed at: 05/11/2024 1054   TRE: How many times in the past year have you...    Used an illegal drug or used a prescription medication for non-medical reasons? Never Filed at: 05/11/2024 1054            Wells' Criteria for PE      Flowsheet Row Most Recent Value   Wells' Criteria for PE    Clinical signs and symptoms of DVT 3 Filed at: 05/11/2024 1128   PE is primary diagnosis or equally likely 0 Filed at: 05/11/2024 1128   HR >100 1.5 Filed at: 05/11/2024 1128   Immobilization at least 3 days or Surgery in the previous 4 weeks 0 Filed at: 05/11/2024 1128   Previous, objectively diagnosed PE or DVT 1.5 Filed at: 05/11/2024 1128   Hemoptysis 0 Filed at: 05/11/2024 1128   Malignancy with treatment within 6 months or palliative 0 Filed at: 05/11/2024 1128   Wells' Criteria Total 6 Filed at: 05/11/2024 1128                  Medical Decision Making  34 yo male presenting for evaluation.  +  DVT in left calf on outpatient  study.  Given complaints of SOB, will require further work up to rule out PE.  Respiratory status stable on room air, he does not appear in any distress.  DVT appears unprovoked, will obtain thrombosis panel and will need outpatient follow up on these results.    Work up obtained as noted above.  EKG and troponin not c/w ACS.  No evidence of heart failure.  No noted significant leukocytosis, no anemia.  No infectious concerns.  No hypo or hyperglycemia.  Renal function within normal limits.  Electrolytes within normal limits.    CTA chest was negative for PE or acute pathology.  Incidental hepatic steatosis.  Pt appropriate for discharge with outpatient management of lower extremity DVT.  Pt was initiated on eliquis.  He was provided a card for a free 30 day supply.  He is aware he will need to continue this beyond the 30 days and further refills will be need from PCP.  Total treatment time TBD but likely needs 3-6 months.  Since he is now new to the area, does not have local PCP.  Will refer to  clinic in [place] as this is where he is staying and does rely on walking for transportation.  We reviewed risks/side effects of blood thinners to include increased bleeding risks.  He was advised to discontinue OTC NSAIDs and should use tylenol for pain.    Reviewed symptomatic management.  OTC meds reviewed.  Anticipatory guidance.  Advised recheck with PCP or return to ER as needed.  Strict return precautions outlined.  Patient voiced understanding and had no further questions.    Please refer to above ER course for further details/discussion.      Problems Addressed:  Acute deep vein thrombosis (DVT) of left lower extremity (HCC): acute illness or injury    Amount and/or Complexity of Data Reviewed  External Data Reviewed: labs, radiology and notes.  Labs: ordered. Decision-making details documented in ED Course.  Radiology: ordered. Decision-making details documented in ED Course.  ECG/medicine tests: ordered and  independent interpretation performed. Decision-making details documented in ED Course.    Risk  OTC drugs.  Prescription drug management.             Disposition  Final diagnoses:   Acute deep vein thrombosis (DVT) of left lower extremity (HCC)     Time reflects when diagnosis was documented in both MDM as applicable and the Disposition within this note       Time User Action Codes Description Comment    5/11/2024 12:51 PM Marine Springberly Add [I82.402] Acute deep vein thrombosis (DVT) of left lower extremity (HCC)           ED Disposition       ED Disposition   Discharge    Condition   Stable    Date/Time   Sat May 11, 2024 1251    Comment   Joe Milian discharge to home/self care.                   Follow-up Information       Follow up With Specialties Details Why Contact Info Additional Information    [facility] Emergency Department Emergency Medicine  As needed [address] [facility] [id number] [phone] [facility] Department, [address], [place], [address]    [facility] Family Medicine Schedule an appointment as soon as possible for a visit   [address] [facility] [address] [phone] [facility], [address], [place], [address] [phone]            Discharge Medication List as of 5/11/2024  1:03 PM        START taking these medications    Details   apixaban (Eliquis) 5 mg Multiple Dosages:Starting Sat 5/11/2024, Until Fri 5/17/2024, THEN Starting Sat 5/18/2024, Until Sun 6/9/2024Take 2 tablets (10 mg total) by mouth 2 (two) times a day for 7 days, THEN 1 tablet (5 mg total) 2 (two) times a day for 23 days., Normal           CONTINUE these medications which have NOT CHANGED    Details   amLODIPine (NORVASC) 5 mg tablet Take 1 tablet (5 mg total) by mouth daily Do not start before May 11, 2024., Starting Sat 5/11/2024, Until  Mon 6/10/2024, Print      fluticasone (FLONASE) 50 mcg/act nasal spray 1 spray into each nostril daily, Starting Tue 12/12/2023, Normal      levETIRAcetam (Keppra) 500 mg tablet Take 1 tablet (500 mg total) by mouth every 12 (twelve) hours, Starting Thu 1/11/2024, Normal      loratadine (CLARITIN) 10 mg tablet Take 1 tablet (10 mg total) by mouth daily, Starting Tue 12/12/2023, Normal      LYRICA 50 MG capsule Take 50 mg by mouth 2 (two) times a day, Starting Wed 4/10/2019, Historical Med      VENTOLIN  (90 Base) MCG/ACT inhaler as needed, Starting Mon 3/25/2019, Historical Med                 PDMP Review       None            ED Provider  Electronically Signed by             Julia Spring PA-C  05/11/24 5617

## 2024-05-11 NOTE — DISCHARGE INSTRUCTIONS
Take eliquis twice daily as directed.  The medication is 10 mg twice a day for a week then 5 mg twice a day.  The started pack is for a month supply but you will need to continue this for at least 3-6 months.  You should see your family doctor in follow up after today's ED visit.  Follow up with your PCP for recheck, return to ER as needed.

## 2024-05-11 NOTE — ED ATTENDING ATTESTATION
5/10/2024  ITrudi DO, saw and evaluated the patient. I have discussed the patient with the resident/non-physician practitioner and agree with the resident's/non-physician practitioner's findings, Plan of Care, and MDM as documented in the resident's/non-physician practitioner's note, except where noted. All available labs and Radiology studies were reviewed.  I was present for key portions of any procedure(s) performed by the resident/non-physician practitioner and I was immediately available to provide assistance.       At this point I agree with the current assessment done in the Emergency Department.  I have conducted an independent evaluation of this patient a history and physical is as follows:    32yo male presents for evaluation of leg pain. Leg pain in left calf about 2-3 days, atraumatic, sudden onset. [facility] hospitalization about a month ago, denies known VTE or bleeding disorder history. Worse with walking, tried OTC meds at home without relief. Do not believe XR would be helpful at this time given atraumatic pain, no evidence of infection or need to look for subq gas. Possible MSK/overuse injury. Will check dimer as we cannot obtain duplex at this time of day. If elevated, will dose with lovenox and order outpatient duplex study.       ED Course         Critical Care Time  Procedures

## 2024-05-11 NOTE — DISCHARGE INSTRUCTIONS
Follow up at [facility] ultrasound department to have an ultrasound done of your leg.    Follow up with your primary care team regarding your high blood pressure.    Return to the ER if you develop:    Chest pain, shortness of breath, enlarging rash, worsening pain, fever.

## 2024-05-12 ENCOUNTER — TELEPHONE (OUTPATIENT)
Dept: OTHER | Facility: OTHER | Age: 34
End: 2024-05-12

## 2024-05-12 NOTE — TELEPHONE ENCOUNTER
Pt called in to schedule new pt appt. Tried verifying which office pt was calling into and call disconnected.

## 2024-05-12 NOTE — TELEPHONE ENCOUNTER
"Pt stated, \"I was in the ER 2 times with blood clots and I need to schedule an appointment.\"    Please follow up with pt when office reopens. Thank you     Pt declined to speak with a nurse.   "

## 2024-05-13 LAB
ATRIAL RATE: 82 BPM
P AXIS: 27 DEGREES
PR INTERVAL: 132 MS
PROT C AG ACT/NOR PPP IA: >150 % OF NORMAL (ref 60–150)
PROT S ACT/NOR PPP: 28 % (ref 71–117)
QRS AXIS: 34 DEGREES
QRSD INTERVAL: 92 MS
QT INTERVAL: 370 MS
QTC INTERVAL: 432 MS
T WAVE AXIS: 28 DEGREES
VENTRICULAR RATE: 82 BPM

## 2024-05-13 PROCEDURE — 93010 ELECTROCARDIOGRAM REPORT: CPT | Performed by: INTERNAL MEDICINE

## 2024-05-14 LAB
PROT S ACT/NOR PPP: 85 % (ref 61–136)
PROT S PPP-ACNC: 103 % (ref 60–150)

## 2024-05-15 LAB
APTT HEX PL PPP: 9 SEC (ref 0–11)
APTT SCREEN TO CONFIRM RATIO: 1.13 RATIO (ref 0–1.34)
APTT-LA IMM 4:1 NP PPP: 47.8 SEC (ref 0–40.5)
CONFIRM APTT/NORMAL: 35.4 SEC (ref 0–47.6)
LA PPP-IMP: ABNORMAL
SCREEN APTT: 50.3 SEC (ref 0–43.5)
SCREEN DRVVT: 42 SEC (ref 0–47)
THROMBIN TIME: 16.3 SEC (ref 0–23)

## 2024-05-16 ENCOUNTER — TELEPHONE (OUTPATIENT)
Dept: FAMILY MEDICINE CLINIC | Facility: CLINIC | Age: 34
End: 2024-05-16

## 2024-05-16 ENCOUNTER — OFFICE VISIT (OUTPATIENT)
Dept: FAMILY MEDICINE CLINIC | Facility: CLINIC | Age: 34
End: 2024-05-16
Payer: MEDICARE

## 2024-05-16 VITALS
BODY MASS INDEX: 46.38 KG/M2 | DIASTOLIC BLOOD PRESSURE: 80 MMHG | TEMPERATURE: 98.6 F | RESPIRATION RATE: 20 BRPM | HEIGHT: 68 IN | SYSTOLIC BLOOD PRESSURE: 134 MMHG | WEIGHT: 306 LBS | HEART RATE: 84 BPM

## 2024-05-16 DIAGNOSIS — D68.9 COAGULOPATHY (HCC): ICD-10-CM

## 2024-05-16 DIAGNOSIS — I82.4Z2 ACUTE DEEP VEIN THROMBOSIS (DVT) OF DISTAL VEIN OF LEFT LOWER EXTREMITY (HCC): Primary | ICD-10-CM

## 2024-05-16 DIAGNOSIS — R03.0 ELEVATED BLOOD PRESSURE READING: ICD-10-CM

## 2024-05-16 DIAGNOSIS — E55.9 VITAMIN D DEFICIENCY: ICD-10-CM

## 2024-05-16 DIAGNOSIS — I10 ESSENTIAL HYPERTENSION: ICD-10-CM

## 2024-05-16 PROCEDURE — 99204 OFFICE O/P NEW MOD 45 MIN: CPT | Performed by: FAMILY MEDICINE

## 2024-05-16 RX ORDER — HYDROCODONE BITARTRATE AND ACETAMINOPHEN 5; 325 MG/1; MG/1
1 TABLET ORAL EVERY 6 HOURS PRN
Qty: 8 TABLET | Refills: 0 | Status: SHIPPED | OUTPATIENT
Start: 2024-05-16

## 2024-05-16 NOTE — PROGRESS NOTES
Assessment/Plan: Acute DVT of the left posterior tibial veins currently anticoagulated with Eliquis.  The patient states that his mother had a DVT and his son had a DVT with PE.  The plan is to have the patient evaluated by hematology for coagulopathy.  PTT-LA max was elevated at 47.8 thrombus panel is abnormal.  Protein C is abnormal protein S activity abnormal lupus anticoagulant abnormal Antithrombin III abnormal cardiolipin is pending.    Hypertension treated with Norvasc    History of seizure disorder has been seizure-free for 4 years    Tobacco use disorder the patient has a 12.4-pack-year history of tobacco use is not interested in stopping at this time    Problem List Items Addressed This Visit    None  Visit Diagnoses     Acute deep vein thrombosis (DVT) of distal vein of left lower extremity (HCC)    -  Primary    Relevant Medications    HYDROcodone-acetaminophen (Norco) 5-325 mg per tablet    Other Relevant Orders    Ambulatory Referral to Hematology / Oncology    Coagulopathy (HCC)        Relevant Orders    Ambulatory Referral to Hematology / Oncology    Elevated blood pressure reading        Essential hypertension        Relevant Orders    CBC and differential    Comprehensive metabolic panel    Lipid Panel with Direct LDL reflex    Vitamin D deficiency        Relevant Orders    Vitamin D 25 hydroxy           Diagnoses and all orders for this visit:    Acute deep vein thrombosis (DVT) of distal vein of left lower extremity (HCC)  -     Ambulatory Referral to Hematology / Oncology; Future  -     HYDROcodone-acetaminophen (Norco) 5-325 mg per tablet; Take 1 tablet by mouth every 6 (six) hours as needed for pain Max Daily Amount: 4 tablets    Coagulopathy (HCC)  -     Ambulatory Referral to Hematology / Oncology; Future    Elevated blood pressure reading  -     Ambulatory Referral to Family Practice    Essential hypertension  -     CBC and differential; Future  -     Comprehensive metabolic panel;  Future  -     Lipid Panel with Direct LDL reflex; Future    Vitamin D deficiency  -     Vitamin D 25 hydroxy; Future        No problem-specific Assessment & Plan notes found for this encounter.      PHQ-2/9 Depression Screening    Little interest or pleasure in doing things: 0 - not at all  Feeling down, depressed, or hopeless: 0 - not at all  PHQ-2 Score: 0  PHQ-2 Interpretation: Negative depression screen          Body mass index is 46.53 kg/m².    BMI Counseling: Body mass index is 46.53 kg/m². The BMI     Subjective:      Patient ID: Joe Milian is a 33 y.o. male.    Patient is seen in follow-up from the [facility] ER May 10 and May 11 visit found to have acute deep vein thrombosis of the left lower extremity        The following portions of the patient's history were reviewed and updated as appropriate:   He has a past medical history of Asthma, Depression, and Seizure (HCC).,  does not have any pertinent problems on file.,   has no past surgical history on file.,  family history includes Mental illness in his mother and sister.,   reports that he has been smoking cigarettes. He started smoking about 12 years ago. He has a 12.4 pack-year smoking history. He uses smokeless tobacco. He reports that he does not currently use alcohol. He reports that he does not currently use drugs after having used the following drugs: Marijuana.,  is allergic to amoxicillin and penicillins..  Current Outpatient Medications   Medication Sig Dispense Refill   • HYDROcodone-acetaminophen (Norco) 5-325 mg per tablet Take 1 tablet by mouth every 6 (six) hours as needed for pain Max Daily Amount: 4 tablets 8 tablet 0   • amLODIPine (NORVASC) 5 mg tablet Take 1 tablet (5 mg total) by mouth daily Do not start before May 11, 2024. 30 tablet 0   • apixaban (Eliquis) 5 mg Take 2 tablets (10 mg total) by mouth 2 (two) times a day for 7 days, THEN 1 tablet (5 mg total) 2 (two) times a day for 23 days. 74 tablet 0   • fluticasone (FLONASE) 50  "mcg/act nasal spray 1 spray into each nostril daily (Patient not taking: Reported on 5/11/2024) 16 g 0   • levETIRAcetam (Keppra) 500 mg tablet Take 1 tablet (500 mg total) by mouth every 12 (twelve) hours (Patient not taking: Reported on 5/11/2024) 14 tablet 0   • loratadine (CLARITIN) 10 mg tablet Take 1 tablet (10 mg total) by mouth daily (Patient not taking: Reported on 5/11/2024) 20 tablet 0   • LYRICA 50 MG capsule Take 50 mg by mouth 2 (two) times a day (Patient not taking: Reported on 5/11/2024)  0   • VENTOLIN  (90 Base) MCG/ACT inhaler as needed (Patient not taking: Reported on 5/11/2024)  1     No current facility-administered medications for this visit.       Review of Systems   Constitutional:  Negative for chills and fever.   HENT:  Negative for ear pain and sore throat.    Eyes:  Negative for pain and visual disturbance.   Respiratory:  Negative for cough and shortness of breath.    Cardiovascular:  Negative for chest pain and palpitations.   Gastrointestinal:  Negative for abdominal pain and vomiting.   Genitourinary:  Negative for dysuria and hematuria.   Musculoskeletal:  Negative for arthralgias and back pain.        Left calf pain   Skin:  Negative for color change and rash.   Neurological:  Negative for seizures and syncope.   All other systems reviewed and are negative.        Objective:    /80   Pulse 84   Temp 98.6 °F (37 °C)   Resp 20   Ht 5' 8\" (1.727 m)   Wt (!) 139 kg (306 lb)   BMI 46.53 kg/m²   Body mass index is 46.53 kg/m².     Physical Exam  Constitutional:       Appearance: Normal appearance. He is well-developed. He is obese.   HENT:      Head: Normocephalic and atraumatic.      Right Ear: Tympanic membrane, ear canal and external ear normal.      Left Ear: Tympanic membrane, ear canal and external ear normal.      Nose: Nose normal.      Mouth/Throat:      Mouth: Mucous membranes are moist.      Pharynx: Oropharynx is clear.   Eyes:      Extraocular Movements: " Extraocular movements intact.      Conjunctiva/sclera: Conjunctivae normal.      Pupils: Pupils are equal, round, and reactive to light.   Cardiovascular:      Rate and Rhythm: Normal rate and regular rhythm.      Pulses: Normal pulses.      Heart sounds: Normal heart sounds.      Comments: Left leg swelling  Pulmonary:      Effort: Pulmonary effort is normal.      Breath sounds: Normal breath sounds.   Abdominal:      General: Abdomen is flat. Bowel sounds are normal.      Palpations: Abdomen is soft.      Tenderness: There is no abdominal tenderness.   Musculoskeletal:         General: Normal range of motion.      Cervical back: Normal range of motion and neck supple.   Skin:     General: Skin is warm and dry.      Capillary Refill: Capillary refill takes less than 2 seconds.   Neurological:      General: No focal deficit present.      Mental Status: He is alert and oriented to person, place, and time.   Psychiatric:         Mood and Affect: Mood normal.         Behavior: Behavior normal.         Thought Content: Thought content normal.         Judgment: Judgment normal.

## 2024-05-17 ENCOUNTER — TELEPHONE (OUTPATIENT)
Dept: HEMATOLOGY ONCOLOGY | Facility: CLINIC | Age: 34
End: 2024-05-17

## 2024-05-17 LAB
B2 GLYCOPROT1 IGA SERPL IA-ACNC: 2.3
B2 GLYCOPROT1 IGG SERPL IA-ACNC: 0.9
B2 GLYCOPROT1 IGM SERPL IA-ACNC: 4.2
CARDIOLIPIN IGA SER IA-ACNC: 2.6
CARDIOLIPIN IGG SER IA-ACNC: 1.2
CARDIOLIPIN IGM SER IA-ACNC: 52

## 2024-05-17 NOTE — TELEPHONE ENCOUNTER
I called Joe in response to a referral that was received for patient to establish care with Hematology.     Outreach was made to schedule a consultation.    A consultation was scheduled for patient during this call. Patient is scheduled on 6/21/24 at 3 with Mary at the [facility]

## 2024-05-17 NOTE — TELEPHONE ENCOUNTER
Appointment Confirmation   Who are you speaking with? Patient   If it is not the patient, are they listed on an active communication consent form? N/A   Which provider is the appointment scheduled with?  Dr. Hernandez   When is the appointment scheduled?  Please list date and time 6/21/24 @ 3pm   At which location is the appointment scheduled to take place? [place]   Did caller verbalize understanding of appointment details? Yes

## 2024-05-30 ENCOUNTER — APPOINTMENT (EMERGENCY)
Dept: CT IMAGING | Facility: HOSPITAL | Age: 34
End: 2024-05-30
Payer: COMMERCIAL

## 2024-05-30 ENCOUNTER — HOSPITAL ENCOUNTER (EMERGENCY)
Facility: HOSPITAL | Age: 34
Discharge: HOME | End: 2024-05-30
Attending: EMERGENCY MEDICINE
Payer: COMMERCIAL

## 2024-05-30 ENCOUNTER — APPOINTMENT (EMERGENCY)
Dept: RADIOLOGY | Facility: HOSPITAL | Age: 34
End: 2024-05-30
Payer: COMMERCIAL

## 2024-05-30 VITALS
BODY MASS INDEX: 46.59 KG/M2 | TEMPERATURE: 97.8 F | WEIGHT: 306.44 LBS | OXYGEN SATURATION: 96 % | RESPIRATION RATE: 20 BRPM | DIASTOLIC BLOOD PRESSURE: 76 MMHG | HEART RATE: 66 BPM | SYSTOLIC BLOOD PRESSURE: 134 MMHG

## 2024-05-30 DIAGNOSIS — R93.89 ABNORMAL FINDING ON CT SCAN: ICD-10-CM

## 2024-05-30 DIAGNOSIS — G40.919 BREAKTHROUGH SEIZURE (HCC): Primary | ICD-10-CM

## 2024-05-30 LAB
ALBUMIN SERPL BCP-MCNC: 4.2 G/DL (ref 3.5–5)
ALP SERPL-CCNC: 65 U/L (ref 34–104)
ALT SERPL W P-5'-P-CCNC: 23 U/L (ref 7–52)
AMPHETAMINES SERPL QL SCN: NEGATIVE
ANION GAP SERPL CALCULATED.3IONS-SCNC: 8 MMOL/L (ref 4–13)
AST SERPL W P-5'-P-CCNC: 20 U/L (ref 13–39)
BARBITURATES UR QL: NEGATIVE
BASOPHILS # BLD AUTO: 0.08 THOUSANDS/ÂΜL (ref 0–0.1)
BASOPHILS NFR BLD AUTO: 1 % (ref 0–1)
BENZODIAZ UR QL: NEGATIVE
BILIRUB SERPL-MCNC: 0.44 MG/DL (ref 0.2–1)
BUN SERPL-MCNC: 11 MG/DL (ref 5–25)
CALCIUM SERPL-MCNC: 9.7 MG/DL (ref 8.4–10.2)
CARDIAC TROPONIN I PNL SERPL HS: 4 NG/L
CHLORIDE SERPL-SCNC: 105 MMOL/L (ref 96–108)
CK SERPL-CCNC: 436 U/L (ref 39–308)
CO2 SERPL-SCNC: 27 MMOL/L (ref 21–32)
COCAINE UR QL: NEGATIVE
CREAT SERPL-MCNC: 0.82 MG/DL (ref 0.6–1.3)
EOSINOPHIL # BLD AUTO: 0.27 THOUSAND/ÂΜL (ref 0–0.61)
EOSINOPHIL NFR BLD AUTO: 3 % (ref 0–6)
ERYTHROCYTE [DISTWIDTH] IN BLOOD BY AUTOMATED COUNT: 12.8 % (ref 11.6–15.1)
FENTANYL UR QL SCN: NEGATIVE
GFR SERPL CREATININE-BSD FRML MDRD: 117 ML/MIN/1.73SQ M
GLUCOSE SERPL-MCNC: 95 MG/DL (ref 65–140)
GLUCOSE SERPL-MCNC: 96 MG/DL (ref 65–140)
HCT VFR BLD AUTO: 46.2 % (ref 36.5–49.3)
HGB BLD-MCNC: 15.2 G/DL (ref 12–17)
HYDROCODONE UR QL SCN: NEGATIVE
IMM GRANULOCYTES # BLD AUTO: 0.04 THOUSAND/UL (ref 0–0.2)
IMM GRANULOCYTES NFR BLD AUTO: 0 % (ref 0–2)
LYMPHOCYTES # BLD AUTO: 3.01 THOUSANDS/ÂΜL (ref 0.6–4.47)
LYMPHOCYTES NFR BLD AUTO: 32 % (ref 14–44)
MCH RBC QN AUTO: 28.7 PG (ref 26.8–34.3)
MCHC RBC AUTO-ENTMCNC: 32.9 G/DL (ref 31.4–37.4)
MCV RBC AUTO: 87 FL (ref 82–98)
METHADONE UR QL: NEGATIVE
MONOCYTES # BLD AUTO: 0.6 THOUSAND/ÂΜL (ref 0.17–1.22)
MONOCYTES NFR BLD AUTO: 6 % (ref 4–12)
NEUTROPHILS # BLD AUTO: 5.37 THOUSANDS/ÂΜL (ref 1.85–7.62)
NEUTS SEG NFR BLD AUTO: 58 % (ref 43–75)
NRBC BLD AUTO-RTO: 0 /100 WBCS
OPIATES UR QL SCN: NEGATIVE
OXYCODONE+OXYMORPHONE UR QL SCN: NEGATIVE
PCP UR QL: NEGATIVE
PLATELET # BLD AUTO: 246 THOUSANDS/UL (ref 149–390)
PMV BLD AUTO: 9.5 FL (ref 8.9–12.7)
POTASSIUM SERPL-SCNC: 3.5 MMOL/L (ref 3.5–5.3)
PROT SERPL-MCNC: 6.7 G/DL (ref 6.4–8.4)
RBC # BLD AUTO: 5.3 MILLION/UL (ref 3.88–5.62)
SODIUM SERPL-SCNC: 140 MMOL/L (ref 135–147)
THC UR QL: NEGATIVE
WBC # BLD AUTO: 9.37 THOUSAND/UL (ref 4.31–10.16)

## 2024-05-30 PROCEDURE — 36415 COLL VENOUS BLD VENIPUNCTURE: CPT | Performed by: PHYSICIAN ASSISTANT

## 2024-05-30 PROCEDURE — 82948 REAGENT STRIP/BLOOD GLUCOSE: CPT

## 2024-05-30 PROCEDURE — 96375 TX/PRO/DX INJ NEW DRUG ADDON: CPT

## 2024-05-30 PROCEDURE — 93005 ELECTROCARDIOGRAM TRACING: CPT

## 2024-05-30 PROCEDURE — 96366 THER/PROPH/DIAG IV INF ADDON: CPT

## 2024-05-30 PROCEDURE — 82550 ASSAY OF CK (CPK): CPT | Performed by: PHYSICIAN ASSISTANT

## 2024-05-30 PROCEDURE — 70450 CT HEAD/BRAIN W/O DYE: CPT

## 2024-05-30 PROCEDURE — 80307 DRUG TEST PRSMV CHEM ANLYZR: CPT | Performed by: PHYSICIAN ASSISTANT

## 2024-05-30 PROCEDURE — 99285 EMERGENCY DEPT VISIT HI MDM: CPT

## 2024-05-30 PROCEDURE — 99285 EMERGENCY DEPT VISIT HI MDM: CPT | Performed by: PHYSICIAN ASSISTANT

## 2024-05-30 PROCEDURE — 96365 THER/PROPH/DIAG IV INF INIT: CPT

## 2024-05-30 PROCEDURE — 80053 COMPREHEN METABOLIC PANEL: CPT | Performed by: PHYSICIAN ASSISTANT

## 2024-05-30 PROCEDURE — 84484 ASSAY OF TROPONIN QUANT: CPT | Performed by: PHYSICIAN ASSISTANT

## 2024-05-30 PROCEDURE — 71045 X-RAY EXAM CHEST 1 VIEW: CPT

## 2024-05-30 PROCEDURE — 85025 COMPLETE CBC W/AUTO DIFF WBC: CPT | Performed by: PHYSICIAN ASSISTANT

## 2024-05-30 RX ORDER — LEVETIRACETAM 500 MG/5ML
1000 INJECTION, SOLUTION, CONCENTRATE INTRAVENOUS ONCE
Status: COMPLETED | OUTPATIENT
Start: 2024-05-30 | End: 2024-05-30

## 2024-05-30 RX ORDER — SODIUM CHLORIDE, SODIUM GLUCONATE, SODIUM ACETATE, POTASSIUM CHLORIDE, MAGNESIUM CHLORIDE, SODIUM PHOSPHATE, DIBASIC, AND POTASSIUM PHOSPHATE .53; .5; .37; .037; .03; .012; .00082 G/100ML; G/100ML; G/100ML; G/100ML; G/100ML; G/100ML; G/100ML
1000 INJECTION, SOLUTION INTRAVENOUS ONCE
Status: COMPLETED | OUTPATIENT
Start: 2024-05-30 | End: 2024-05-30

## 2024-05-30 RX ORDER — ACETAMINOPHEN 325 MG/1
975 TABLET ORAL ONCE
Status: COMPLETED | OUTPATIENT
Start: 2024-05-30 | End: 2024-05-30

## 2024-05-30 RX ORDER — LEVETIRACETAM 500 MG/1
500 TABLET ORAL EVERY 12 HOURS SCHEDULED
Qty: 60 TABLET | Refills: 0 | Status: SHIPPED | OUTPATIENT
Start: 2024-05-30

## 2024-05-30 RX ADMIN — LEVETIRACETAM 1000 MG: 100 INJECTION, SOLUTION INTRAVENOUS at 10:39

## 2024-05-30 RX ADMIN — ACETAMINOPHEN 975 MG: 325 TABLET, FILM COATED ORAL at 11:00

## 2024-05-30 RX ADMIN — SODIUM CHLORIDE, SODIUM GLUCONATE, SODIUM ACETATE, POTASSIUM CHLORIDE, MAGNESIUM CHLORIDE, SODIUM PHOSPHATE, DIBASIC, AND POTASSIUM PHOSPHATE 1000 ML: .53; .5; .37; .037; .03; .012; .00082 INJECTION, SOLUTION INTRAVENOUS at 10:56

## 2024-05-30 NOTE — ED PROVIDER NOTES
History  Chief Complaint   Patient presents with    Seizure - Prior Hx Of     Patient broguht by EMS for seizure at work lasting 10-12 minutes (per job). Ems reports patient was alert and oriented upon arrival gcs 15 and diaphoretic. Patient reports headache with lightheadedness. Last seizure 5 years ago. No medications given     33 year old male with PMH seizure disorder (not currently on anti-epileptics), asthma, depression, recent DVT on eliquis presenting via EMS for evaluation after a seizure.  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  He states he began having a headache and feeling lightheaded and states his eyes started twitching.  He reports he then woke up on the ground with people around him telling he had a seizure which reportedly lasted about 10 minutes.  There was reports of generalized body shaking and seizure activity.  He was reportedly post ictal for EMS.  He is awake, alert and oriented upon arrival in our department.  He states he has been under massive stress recently and he feels this has been a trigger for his seizures.  He tells me his last seizure was several years ago however there was prior ER visits for breakthrough seizure noted in his chart most recently 12/5/23 and 1/11/24.  He is not currently on any AEDs.  He reports when taking these meds in the past he had more seizures.  He states he started smoking marijuana which helped his seizures but states he hasn't had access to marijuana since coming to this area.  He was evaluated here on 5/11/24 and diagnosed with DVT and started on eliquis.  He reports compliance with this medication.  He tells me that the pain in his leg has resolved.  He has otherwise been in usual state of health.  He notes he had headache 2 days ago but this improved without intervention.  No reported head trauma around that time.  He states mild headache at present.  Denies visual disturbance.  Denies dizziness, lightheadedness.  Denies numbness, tingling,  weakness.  Denies neck or back pain.  Denies chest pain, SOB, N/V/D, abdominal pain.  Denies fever, chills, cough, congestion.   He received no medications prior to arrival.      History provided by:  Patient and medical records   used: No    Seizure - Prior Hx Of  Seizure activity on arrival: no    Preceding symptoms: dizziness and headache    Episode characteristics: generalized shaking    Postictal symptoms: confusion    Return to baseline: yes    Progression:  Improving  Context: medical non-compliance and stress    Context: not alcohol withdrawal, not fever, not possible medication ingestion and not previous head injury    Recent head injury:  No recent head injuries  PTA treatment:  None  History of seizures: yes        Prior to Admission Medications   Prescriptions Last Dose Informant Patient Reported? Taking?   HYDROcodone-acetaminophen (Norco) 5-325 mg per tablet   No No   Sig: Take 1 tablet by mouth every 6 (six) hours as needed for pain Max Daily Amount: 4 tablets   LYRICA 50 MG capsule Not Taking  Yes No   Sig: Take 50 mg by mouth 2 (two) times a day   Patient not taking: Reported on 2024   VENTOLIN  (90 Base) MCG/ACT inhaler Not Taking  Yes No   Sig: as needed   Patient not taking: Reported on 2024   amLODIPine (NORVASC) 5 mg tablet 2024  No Yes   Sig: Take 1 tablet (5 mg total) by mouth daily Do not start before May 11, 2024.   apixaban (Eliquis) 5 mg 2024  No Yes   Sig: Take 2 tablets (10 mg total) by mouth 2 (two) times a day for 7 days, THEN 1 tablet (5 mg total) 2 (two) times a day for 23 days.   fluticasone (FLONASE) 50 mcg/act nasal spray Not Taking  No No   Si spray into each nostril daily   Patient not taking: Reported on 2024   loratadine (CLARITIN) 10 mg tablet Not Taking  No No   Sig: Take 1 tablet (10 mg total) by mouth daily   Patient not taking: Reported on 2024      Facility-Administered Medications: None       Past Medical  History:   Diagnosis Date    Asthma     Depression     Seizure (HCC)        History reviewed. No pertinent surgical history.    Family History   Problem Relation Age of Onset    Mental illness Mother     Mental illness Sister      I have reviewed and agree with the history as documented.    E-Cigarette/Vaping    E-Cigarette Use Never User      E-Cigarette/Vaping Substances     Social History     Tobacco Use    Smoking status: Every Day     Current packs/day: 1.00     Average packs/day: 1 pack/day for 12.4 years (12.4 ttl pk-yrs)     Types: Cigarettes     Start date: 2012    Smokeless tobacco: Current   Vaping Use    Vaping status: Never Used   Substance Use Topics    Alcohol use: Not Currently    Drug use: Not Currently     Comment: 05/16/2024-- Hx marijuana       Review of Systems   Constitutional: Negative.  Negative for chills, fatigue and fever.   HENT: Negative.  Negative for congestion, rhinorrhea and sore throat.    Eyes: Negative.  Negative for visual disturbance.   Respiratory: Negative.  Negative for cough, shortness of breath and wheezing.    Cardiovascular: Negative.  Negative for chest pain, palpitations and leg swelling.   Gastrointestinal: Negative.  Negative for abdominal pain, constipation, diarrhea, nausea and vomiting.   Genitourinary: Negative.  Negative for dysuria, flank pain, frequency and hematuria.   Musculoskeletal: Negative.  Negative for back pain, myalgias and neck pain.   Skin: Negative.  Negative for rash.   Neurological:  Positive for seizures, light-headedness and headaches. Negative for dizziness, speech difficulty and numbness.   Psychiatric/Behavioral: Negative.     All other systems reviewed and are negative.      Physical Exam  Physical Exam  Vitals and nursing note reviewed.   Constitutional:       General: He is awake. He is not in acute distress.     Appearance: Normal appearance. He is well-developed. He is obese. He is not toxic-appearing or diaphoretic.   HENT:      Head:  Normocephalic and atraumatic.      Jaw: There is normal jaw occlusion.      Right Ear: Hearing, tympanic membrane, ear canal and external ear normal. No hemotympanum.      Left Ear: Hearing, tympanic membrane, ear canal and external ear normal. No hemotympanum.      Nose: Nose normal. No signs of injury.      Mouth/Throat:      Lips: Pink.      Mouth: Mucous membranes are moist. No injury.      Tongue: Tongue does not deviate from midline.      Pharynx: Oropharynx is clear. Uvula midline.   Eyes:      General: Lids are normal. No visual field deficit or scleral icterus.     Extraocular Movements: Extraocular movements intact.      Conjunctiva/sclera: Conjunctivae normal.      Pupils: Pupils are equal, round, and reactive to light.   Neck:      Trachea: Trachea and phonation normal.   Cardiovascular:      Rate and Rhythm: Normal rate and regular rhythm.      Pulses: Normal pulses.           Radial pulses are 2+ on the right side and 2+ on the left side.        Dorsalis pedis pulses are 2+ on the right side and 2+ on the left side.        Posterior tibial pulses are 2+ on the right side and 2+ on the left side.      Heart sounds: Normal heart sounds, S1 normal and S2 normal. No murmur heard.  Pulmonary:      Effort: Pulmonary effort is normal. No tachypnea or respiratory distress.      Breath sounds: Normal breath sounds. No wheezing, rhonchi or rales.   Abdominal:      General: Bowel sounds are normal. There is no distension.      Palpations: Abdomen is soft.      Tenderness: There is no abdominal tenderness. There is no guarding or rebound.   Musculoskeletal:         General: No tenderness.      Cervical back: Normal range of motion and neck supple. No spinous process tenderness. Normal range of motion.      Right lower leg: No edema.      Left lower leg: No edema.   Skin:     General: Skin is warm and dry.      Capillary Refill: Capillary refill takes less than 2 seconds.      Findings: No abrasion, bruising,  laceration or rash.   Neurological:      General: No focal deficit present.      Mental Status: He is alert and oriented to person, place, and time.      GCS: GCS eye subscore is 4. GCS verbal subscore is 5. GCS motor subscore is 6.      Cranial Nerves: Cranial nerves 2-12 are intact. No cranial nerve deficit, dysarthria or facial asymmetry.      Sensory: Sensation is intact.      Motor: Motor function is intact. No weakness.      Coordination: Coordination is intact.      Gait: Gait normal.      Comments: Pt awake, alert and oriented sitting in bed.  He is able to tell me his name, date of birth.  Knows we are at [facility] states year is 2024.  He last remembers feeling his eyes twitching and feeling lightheaded and states he woke up on the ground with people around him.   Psychiatric:         Mood and Affect: Mood normal.         Speech: Speech normal. Speech is not slurred.         Behavior: Behavior normal. Behavior is cooperative.         Vital Signs  ED Triage Vitals [05/30/24 1013]   Temperature Pulse Respirations Blood Pressure SpO2   97.8 °F (36.6 °C) 76 16 130/75 96 %      Temp Source Heart Rate Source Patient Position - Orthostatic VS BP Location FiO2 (%)   Temporal Monitor Lying Left arm --      Pain Score       8           Vitals:    05/30/24 1013 05/30/24 1100 05/30/24 1130 05/30/24 1200   BP: 130/75 144/60 105/52 134/76   Pulse: 76 73 73 66   Patient Position - Orthostatic VS: Lying Sitting Sitting Sitting         Visual Acuity      ED Medications  Medications   levETIRAcetam (KEPPRA) injection 1,000 mg (1,000 mg Intravenous Given 5/30/24 1039)   multi-electrolyte (ISOLYTE-S PH 7.4) bolus 1,000 mL (0 mL Intravenous Stopped 5/30/24 1231)   acetaminophen (TYLENOL) tablet 975 mg (975 mg Oral Given 5/30/24 1100)       Diagnostic Studies  Results Reviewed       Procedure Component Value Units Date/Time    HS Troponin I 4hr [[id number]]     Lab Status: No result Specimen: Blood     Rapid  drug screen, urine [[id number]]  (Normal) Collected: 05/30/24 1041    Lab Status: Final result Specimen: Urine, Catheter Updated: 05/30/24 1121     Amph/Meth UR Negative     Barbiturate Ur Negative     Benzodiazepine Urine Negative     Cocaine Urine Negative     Methadone Urine Negative     Opiate Urine Negative     PCP Ur Negative     THC Urine Negative     Oxycodone Urine Negative     Fentanyl Urine Negative     HYDROCODONE URINE Negative    Narrative:      FOR MEDICAL PURPOSES ONLY.   IF CONFIRMATION NEEDED PLEASE CONTACT THE LAB WITHIN 5 DAYS.    Drug Screen Cutoff Levels:  AMPHETAMINE/METHAMPHETAMINES  1000 ng/mL  BARBITURATES     200 ng/mL  BENZODIAZEPINES     200 ng/mL  COCAINE      300 ng/mL  METHADONE      300 ng/mL  OPIATES      300 ng/mL  PHENCYCLIDINE     25 ng/mL  THC       50 ng/mL  OXYCODONE      100 ng/mL  FENTANYL      5 ng/mL  HYDROCODONE     300 ng/mL    HS Troponin 0hr (reflex protocol) [[id number]]  (Normal) Collected: 05/30/24 1023    Lab Status: Final result Specimen: Blood from Arm, Right Updated: 05/30/24 1050     hs TnI 0hr 4 ng/L     Comprehensive metabolic panel [[id number]] Collected: 05/30/24 1023    Lab Status: Final result Specimen: Blood from Arm, Right Updated: 05/30/24 1046     Sodium 140 mmol/L      Potassium 3.5 mmol/L      Chloride 105 mmol/L      CO2 27 mmol/L      ANION GAP 8 mmol/L      BUN 11 mg/dL      Creatinine 0.82 mg/dL      Glucose 95 mg/dL      Calcium 9.7 mg/dL      AST 20 U/L      ALT 23 U/L      Alkaline Phosphatase 65 U/L      Total Protein 6.7 g/dL      Albumin 4.2 g/dL      Total Bilirubin 0.44 mg/dL      eGFR 117 ml/min/1.73sq m     Narrative:      National Kidney Disease Foundation guidelines for Chronic Kidney Disease (CKD):     Stage 1 with normal or high GFR (GFR > 90 mL/min/1.73 square meters)    Stage 2 Mild CKD (GFR = 60-89 mL/min/1.73 square meters)    Stage 3A Moderate CKD (GFR = 45-59 mL/min/1.73 square meters)    Stage 3B Moderate CKD (GFR = 30-44  mL/min/1.73 square meters)    Stage 4 Severe CKD (GFR = 15-29 mL/min/1.73 square meters)    Stage 5 End Stage CKD (GFR <15 mL/min/1.73 square meters)  Note: GFR calculation is accurate only with a steady state creatinine    CK [[id number]]  (Abnormal) Collected: 05/30/24 1023    Lab Status: Final result Specimen: Blood from Arm, Right Updated: 05/30/24 1046     Total  U/L     CBC and differential [[id number]] Collected: 05/30/24 1023    Lab Status: Final result Specimen: Blood from Arm, Right Updated: 05/30/24 1030     WBC 9.37 Thousand/uL      RBC 5.30 Million/uL      Hemoglobin 15.2 g/dL      Hematocrit 46.2 %      MCV 87 fL      MCH 28.7 pg      MCHC 32.9 g/dL      RDW 12.8 %      MPV 9.5 fL      Platelets 246 Thousands/uL      nRBC 0 /100 WBCs      Segmented % 58 %      Immature Grans % 0 %      Lymphocytes % 32 %      Monocytes % 6 %      Eosinophils Relative 3 %      Basophils Relative 1 %      Absolute Neutrophils 5.37 Thousands/µL      Absolute Immature Grans 0.04 Thousand/uL      Absolute Lymphocytes 3.01 Thousands/µL      Absolute Monocytes 0.60 Thousand/µL      Eosinophils Absolute 0.27 Thousand/µL      Basophils Absolute 0.08 Thousands/µL     Fingerstick Glucose (POCT) [[id number]]  (Normal) Collected: 05/30/24 1026    Lab Status: Final result Specimen: Blood Updated: 05/30/24 1026     POC Glucose 96 mg/dl                    CT head without contrast   Final Result by Alexis Steel MD (05/30 1143)      No acute intracranial abnormality. Chronic sinus disease as above. Low-lying cerebellar tonsils again noted. Chiari I malformation not excluded.                  Workstation performed: [id number]         XR chest 1 view portable    (Results Pending)              Procedures  ECG 12 Lead Documentation Only    Date/Time: 5/30/2024 10:24 AM    Performed by: Julia Spring PA-C  Authorized by: Julia Spring PA-C    Indications / Diagnosis:  Seizure  ECG reviewed by me, the ED Provider: yes   "  Patient location:  ED  Previous ECG:     Comparison to cardiac monitor: Yes    Interpretation:     Interpretation: normal    Rate:     ECG rate:  80    ECG rate assessment: normal    Rhythm:     Rhythm: sinus rhythm    Ectopy:     Ectopy: none    QRS:     QRS axis:  Normal    QRS intervals:  Normal  Conduction:     Conduction: normal    ST segments:     ST segments:  Normal  T waves:     T waves: non-specific and inverted      Inverted:  III  Comments:      , QRS 96, QT/QTc 396/456; no acute ischemic changes.           ED Course  ED Course as of 05/30/24 1331   Thu May 30, 2024   1028 POC Glucose: 96   1032 WBC: 9.37   1032 Hemoglobin: 15.2   1032 Platelet Count: 246   1053 GLUCOSE: 95   1053 Creatinine: 0.82   1053 BUN: 11   1053 Sodium: 140   1053 Potassium: 3.5   1053 Chloride: 105   1053 Carbon Dioxide: 27   1053 ANION GAP: 8   1053 Calcium: 9.7   1053 AST: 20   1053 ALT: 23   1053 ALK PHOS: 65   1053 Total Protein: 6.7   1053 Albumin: 4.2   1053 Total Bilirubin: 0.44   1053 GFR, Calculated: 117   1053 hs TnI 0hr: 4   1053 Total CK(!): 436  Mildly elevated, will provide fluids   1113 XR chest 1 view portable  Independently viewed and interpreted by me - no acute cardiopulmonary process; pending official read.   1126 Rapid drug screen, urine  negative   1149 CT head without contrast  IMPRESSION:     No acute intracranial abnormality. Chronic sinus disease as above. Low-lying cerebellar tonsils again noted. Chiari I malformation not excluded.   1151 No recent evaluation with neurology.  Had EEG performed on 7/16/19 and was within normal limits.  Had MRI brain 8/26/19 which showed - \"IMPRESSION:     Chiari I malformation.     Ill-defined 9 mm focus of high signal right brachium pontis.  Repeat MR with contrast to include sagittal FLAIR imaging suggested in 6 months.     No cortical pathology is evident.  Temporal lobes within normal limits given motion related limitations of this exam.\"  A follow up MRI was " recommended by neurologist in 6 months which he did not have completed.   1158 Pt reassessed.  Sister now at bedside.  Updated on results.  He notes improvement of his headache.  He is sitting in bed watching TV.  He's had no further seizure activity.  He has been lost to neurology follow up giving prior homelessness and lack of insurance.  There was been non compliance with prescribed medications.  However now living with sister and she will ensure he gets what he needs.  He does not drive and does not have DL.             HEART Risk Score      Flowsheet Row Most Recent Value   Heart Score Risk Calculator    History 0 Filed at: 05/30/2024 1032   ECG 0 Filed at: 05/30/2024 1032   Age 0 Filed at: 05/30/2024 1032   Risk Factors 1 Filed at: 05/30/2024 1032   Troponin 0 Filed at: 05/30/2024 1032   HEART Score 1 Filed at: 05/30/2024 1032                          SBIRT 22yo+      Flowsheet Row Most Recent Value   Initial Alcohol Screen: US AUDIT-C     1. How often do you have a drink containing alcohol? 0 Filed at: 05/30/2024 1015   2. How many drinks containing alcohol do you have on a typical day you are drinking?  0 Filed at: 05/30/2024 1015   3a. Male UNDER 65: How often do you have five or more drinks on one occasion? 0 Filed at: 05/30/2024 1015   3b. FEMALE Any Age, or MALE 65+: How often do you have 4 or more drinks on one occassion? 0 Filed at: 05/30/2024 1015   Audit-C Score 0 Filed at: 05/30/2024 1015   TRE: How many times in the past year have you...    Used an illegal drug or used a prescription medication for non-medical reasons? Never Filed at: 05/30/2024 1015                      Medical Decision Making  32 yo male presenting for evaluation of a breakthrough seizure.  Currently not taking any anti-epileptics.  No seizure activity upon arrival and he is at baseline mental status.  Prior records reviewed.  Will obtain EKG, CXR, labs.  Will obtain CT head given current use of anticoagulation.  Will load with  keppra.  He has been previously prescribed this but has been non compliant.    Work up obtained as noted above.  EKG and troponin not c/w ACS.  CXR does not reveal pneumonia, pneumothorax, vascular congestion or pleural effusion.  No noted leukocytosis, no anemia.  No infectious concerns.  No hypo or hyperglycemia.  Renal function within normal limits.  Electrolytes within normal limits.  CK mildly elevated, likely due to seizure activity.  UDS negative.  CT head without acute findings.  There was note of probable Chiari malformation which was seen on prior MRI.  Pt remained asymptomatic during ER course.  No further seizure activity.  He has a non focal exam.  Will re-initiate keppra.  Will refer back to neurology.    Reviewed symptomatic management.  OTC meds reviewed.  Anticipatory guidance.  Advised need to see neurology in follow up.  Advised recheck with PCP or return to ER as needed.  Strict return precautions outlined.  Patient and his sister voiced understanding and had no further questions.    Please refer to above ER course for further details/discussion.      Problems Addressed:  Abnormal finding on CT scan: chronic illness or injury     Details: This was also seen on prior MRI brain.  Will refer back to neurology.  Breakthrough seizure (HCC): acute illness or injury     Details: Related to medication non compliance.    Amount and/or Complexity of Data Reviewed  Independent Historian: EMS  External Data Reviewed: labs, radiology, ECG and notes.  Labs: ordered. Decision-making details documented in ED Course.  Radiology: ordered and independent interpretation performed. Decision-making details documented in ED Course.  ECG/medicine tests: ordered and independent interpretation performed. Decision-making details documented in ED Course.    Risk  OTC drugs.  Prescription drug management.  Diagnosis or treatment significantly limited by social determinants of health.             Disposition  Final diagnoses:    Breakthrough seizure (HCC)   Abnormal finding on CT scan - Low-lying cerebellar tonsils again noted. Chiari I malformation not excluded.     Time reflects when diagnosis was documented in both MDM as applicable and the Disposition within this note       Time User Action Codes Description Comment    5/30/2024 12:04 PM Julia Spring Add [G40.919] Breakthrough seizure (HCC)     5/30/2024 12:06 PM Julia Spring Add [R93.89] Abnormal finding on CT scan     5/30/2024 12:06 PM Julia Spring Modify [R93.89] Abnormal finding on CT scan Low-lying cerebellar tonsils again noted. Chiari I malformation not excluded.          ED Disposition       ED Disposition   Discharge    Condition   Stable    Date/Time   Thu May 30, 2024 1204    Comment   Joe Milian discharge to home/self care.                   Follow-up Information       Follow up With Specialties Details Why Contact Info Additional Information    Warren Head,  Family Medicine Schedule an appointment as soon as possible for a visit   [address] [place] [address] [id number] [phone]       [facility] Schedule an appointment as soon as possible for a visit   [address] [id number]  [phone] [facility], [address] [place] [address], [phone]    [phone]            Discharge Medication List as of 5/30/2024 12:08 PM        START taking these medications    Details   levETIRAcetam (Keppra) 500 mg tablet Take 1 tablet (500 mg total) by mouth every 12 (twelve) hours, Starting Thu 5/30/2024, Normal           CONTINUE these medications which have NOT CHANGED    Details   amLODIPine (NORVASC) 5 mg tablet Take 1 tablet (5 mg total) by mouth daily Do not start before May 11, 2024., Starting Sat 5/11/2024, Until Mon 6/10/2024, Print      apixaban (Eliquis) 5 mg Multiple Dosages:Starting Sat 5/11/2024, Until Fri 5/17/2024, THEN Starting Sat  5/18/2024, Until Sun 6/9/2024Take 2 tablets (10 mg total) by mouth 2 (two) times a day for 7 days, THEN 1 tablet (5 mg total) 2 (two) times a day for 23 days., Normal      fluticasone (FLONASE) 50 mcg/act nasal spray 1 spray into each nostril daily, Starting Tue 12/12/2023, Normal      HYDROcodone-acetaminophen (Norco) 5-325 mg per tablet Take 1 tablet by mouth every 6 (six) hours as needed for pain Max Daily Amount: 4 tablets, Starting Thu 5/16/2024, Normal      loratadine (CLARITIN) 10 mg tablet Take 1 tablet (10 mg total) by mouth daily, Starting Tue 12/12/2023, Normal      LYRICA 50 MG capsule Take 50 mg by mouth 2 (two) times a day, Starting Wed 4/10/2019, Historical Med      VENTOLIN  (90 Base) MCG/ACT inhaler as needed, Starting Mon 3/25/2019, Historical Med                 PDMP Review         Value Time User    PDMP Reviewed  Yes 5/16/2024  1:23 PM Warren Head DO            ED Provider  Electronically Signed by             Julia Spring PA-C  05/30/24 [id number]

## 2024-05-30 NOTE — Clinical Note
Joe Milian was seen and treated in our emergency department on 5/30/2024.                Diagnosis:     Joe  .    He may return on this date: 06/03/2024         If you have any questions or concerns, please don't hesitate to call.      Julia Spring PA-C    ______________________________           _______________          _______________  Hospital Representative                              Date                                Time

## 2024-05-30 NOTE — DISCHARGE INSTRUCTIONS
Rest, plenty of fluids.  Start taking the keppra for your seizures.  Follow up with neurology as well as your PCP.  Return to ER as needed.

## 2024-06-03 LAB
ATRIAL RATE: 80 BPM
P AXIS: 3 DEGREES
PR INTERVAL: 148 MS
QRS AXIS: 19 DEGREES
QRSD INTERVAL: 96 MS
QT INTERVAL: 396 MS
QTC INTERVAL: 456 MS
T WAVE AXIS: -1 DEGREES
VENTRICULAR RATE: 80 BPM

## 2024-06-03 PROCEDURE — 93010 ELECTROCARDIOGRAM REPORT: CPT | Performed by: INTERNAL MEDICINE

## 2024-06-13 DIAGNOSIS — R03.0 ELEVATED BLOOD PRESSURE READING: ICD-10-CM

## 2024-06-13 DIAGNOSIS — I82.402 ACUTE DEEP VEIN THROMBOSIS (DVT) OF LEFT LOWER EXTREMITY (HCC): ICD-10-CM

## 2024-06-13 NOTE — TELEPHONE ENCOUNTER
Patient called to request a refill on the following medications:    amLODIPine (NORVASC) 5 mg tablet   apixaban (Eliquis) 5 mg    He would like the sent to [facility] pharmacy in [address].     Patient has an upcoming appt. On June Tuesday June 18th.

## 2024-06-14 RX ORDER — AMLODIPINE BESYLATE 5 MG/1
5 TABLET ORAL DAILY
Qty: 90 TABLET | Refills: 0 | Status: SHIPPED | OUTPATIENT
Start: 2024-06-14 | End: 2024-09-12

## 2024-06-16 ENCOUNTER — APPOINTMENT (OUTPATIENT)
Dept: LAB | Facility: HOSPITAL | Age: 34
End: 2024-06-16
Payer: COMMERCIAL

## 2024-06-16 DIAGNOSIS — I10 ESSENTIAL HYPERTENSION: ICD-10-CM

## 2024-06-16 DIAGNOSIS — E55.9 VITAMIN D DEFICIENCY: ICD-10-CM

## 2024-06-16 LAB
25(OH)D3 SERPL-MCNC: 15.9 NG/ML (ref 30–100)
ALBUMIN SERPL BCP-MCNC: 4.5 G/DL (ref 3.5–5)
ALP SERPL-CCNC: 68 U/L (ref 34–104)
ALT SERPL W P-5'-P-CCNC: 27 U/L (ref 7–52)
ANION GAP SERPL CALCULATED.3IONS-SCNC: 8 MMOL/L (ref 4–13)
AST SERPL W P-5'-P-CCNC: 17 U/L (ref 13–39)
BASOPHILS # BLD AUTO: 0.09 THOUSANDS/ÂΜL (ref 0–0.1)
BASOPHILS NFR BLD AUTO: 1 % (ref 0–1)
BILIRUB SERPL-MCNC: 0.49 MG/DL (ref 0.2–1)
BUN SERPL-MCNC: 9 MG/DL (ref 5–25)
CALCIUM SERPL-MCNC: 10 MG/DL (ref 8.4–10.2)
CHLORIDE SERPL-SCNC: 104 MMOL/L (ref 96–108)
CHOLEST SERPL-MCNC: 200 MG/DL
CO2 SERPL-SCNC: 27 MMOL/L (ref 21–32)
CREAT SERPL-MCNC: 0.83 MG/DL (ref 0.6–1.3)
EOSINOPHIL # BLD AUTO: 0.28 THOUSAND/ÂΜL (ref 0–0.61)
EOSINOPHIL NFR BLD AUTO: 3 % (ref 0–6)
ERYTHROCYTE [DISTWIDTH] IN BLOOD BY AUTOMATED COUNT: 13.3 % (ref 11.6–15.1)
GFR SERPL CREATININE-BSD FRML MDRD: 117 ML/MIN/1.73SQ M
GLUCOSE P FAST SERPL-MCNC: 90 MG/DL (ref 65–99)
HCT VFR BLD AUTO: 47.5 % (ref 36.5–49.3)
HDLC SERPL-MCNC: 32 MG/DL
HGB BLD-MCNC: 15.7 G/DL (ref 12–17)
IMM GRANULOCYTES # BLD AUTO: 0.04 THOUSAND/UL (ref 0–0.2)
IMM GRANULOCYTES NFR BLD AUTO: 0 % (ref 0–2)
LDLC SERPL CALC-MCNC: 138 MG/DL (ref 0–100)
LYMPHOCYTES # BLD AUTO: 2.8 THOUSANDS/ÂΜL (ref 0.6–4.47)
LYMPHOCYTES NFR BLD AUTO: 30 % (ref 14–44)
MCH RBC QN AUTO: 29.1 PG (ref 26.8–34.3)
MCHC RBC AUTO-ENTMCNC: 33.1 G/DL (ref 31.4–37.4)
MCV RBC AUTO: 88 FL (ref 82–98)
MONOCYTES # BLD AUTO: 0.59 THOUSAND/ÂΜL (ref 0.17–1.22)
MONOCYTES NFR BLD AUTO: 6 % (ref 4–12)
NEUTROPHILS # BLD AUTO: 5.48 THOUSANDS/ÂΜL (ref 1.85–7.62)
NEUTS SEG NFR BLD AUTO: 60 % (ref 43–75)
NRBC BLD AUTO-RTO: 0 /100 WBCS
PLATELET # BLD AUTO: 233 THOUSANDS/UL (ref 149–390)
PMV BLD AUTO: 9.4 FL (ref 8.9–12.7)
POTASSIUM SERPL-SCNC: 3.9 MMOL/L (ref 3.5–5.3)
PROT SERPL-MCNC: 7.3 G/DL (ref 6.4–8.4)
RBC # BLD AUTO: 5.4 MILLION/UL (ref 3.88–5.62)
SODIUM SERPL-SCNC: 139 MMOL/L (ref 135–147)
TRIGL SERPL-MCNC: 149 MG/DL
WBC # BLD AUTO: 9.28 THOUSAND/UL (ref 4.31–10.16)

## 2024-06-16 PROCEDURE — 82306 VITAMIN D 25 HYDROXY: CPT

## 2024-06-16 PROCEDURE — 85025 COMPLETE CBC W/AUTO DIFF WBC: CPT

## 2024-06-16 PROCEDURE — 80061 LIPID PANEL: CPT

## 2024-06-16 PROCEDURE — 36415 COLL VENOUS BLD VENIPUNCTURE: CPT

## 2024-06-16 PROCEDURE — 80053 COMPREHEN METABOLIC PANEL: CPT

## 2024-06-18 ENCOUNTER — OFFICE VISIT (OUTPATIENT)
Dept: FAMILY MEDICINE CLINIC | Facility: CLINIC | Age: 34
End: 2024-06-18
Payer: COMMERCIAL

## 2024-06-18 VITALS
TEMPERATURE: 98.2 F | HEIGHT: 68 IN | HEART RATE: 84 BPM | BODY MASS INDEX: 47.29 KG/M2 | RESPIRATION RATE: 20 BRPM | SYSTOLIC BLOOD PRESSURE: 134 MMHG | WEIGHT: 312 LBS | DIASTOLIC BLOOD PRESSURE: 80 MMHG

## 2024-06-18 DIAGNOSIS — D68.9 COAGULOPATHY (HCC): ICD-10-CM

## 2024-06-18 DIAGNOSIS — Z00.00 ANNUAL PHYSICAL EXAM: Primary | ICD-10-CM

## 2024-06-18 DIAGNOSIS — G47.33 OSA (OBSTRUCTIVE SLEEP APNEA): ICD-10-CM

## 2024-06-18 DIAGNOSIS — R56.9 SEIZURE (HCC): ICD-10-CM

## 2024-06-18 DIAGNOSIS — I82.402 ACUTE DEEP VEIN THROMBOSIS (DVT) OF LEFT LOWER EXTREMITY (HCC): ICD-10-CM

## 2024-06-18 DIAGNOSIS — R03.0 ELEVATED BLOOD PRESSURE READING: ICD-10-CM

## 2024-06-18 PROCEDURE — 99395 PREV VISIT EST AGE 18-39: CPT | Performed by: FAMILY MEDICINE

## 2024-06-18 PROCEDURE — 99213 OFFICE O/P EST LOW 20 MIN: CPT | Performed by: FAMILY MEDICINE

## 2024-06-18 NOTE — PROGRESS NOTES
"Adult Annual Physical  Name: Joe Milian      : 1990      MRN: [id number]  Encounter Provider: Warren Head DO  Encounter Date: 2024   Encounter department: [facility] PRIMARY CARE    Assessment & Plan   1. Annual physical exam  2. Acute deep vein thrombosis (DVT) of left lower extremity (HCC)    Immunizations and preventive care screenings were discussed with patient today. Appropriate education was printed on patient's after visit summary.    Counseling:  {Annual Physical; Counselin}         History of Present Illness   {Disappearing Hyperlinks I Encounters * My Last Note * Since Last Visit * History :00666}  Adult Annual Physical  Review of Systems  {Select to Display PMH (Optional):84740}    Objective   {Disappearing Hyperlinks   Review Vitals * Enter New Vitals * Results Review * Labs * Imaging * Cardiology * Procedures * Lung Cancer Screening :03610}  /80   Pulse 84   Temp 98.2 °F (36.8 °C)   Resp 20   Ht 5' 8\" (1.727 m)   Wt (!) 142 kg (312 lb)   BMI 47.44 kg/m²     Physical Exam  Administrative Statements {Disappearing Hyperlinks I  Level of Service * PCMH/PCSP:69030}  {Time Spent Statement (Optional):86587}      "

## 2024-06-18 NOTE — PROGRESS NOTES
Adult Annual Physical  Name: Joe Milian      : 1990      MRN: [id number]  Encounter Provider: Warren Head DO  Encounter Date: 2024   Encounter department: [facility] PRIMARY CARE    Assessment & Plan   1. Annual physical exam  2. Acute deep vein thrombosis (DVT) of left lower extremity (HCC)  -     apixaban (ELIQUIS) 5 mg; Take 1 tablet (5 mg total) by mouth 2 (two) times a day  -     apixaban (ELIQUIS) 5 mg; Take 1 tablet (5 mg total) by mouth 2 (two) times a day  3. Elevated blood pressure reading  4. Coagulopathy (HCC)  5. Seizure (HCC)  6. SAGE (obstructive sleep apnea)  PTT-LA max was elevated at 47.8 thrombus panel is abnormal protein C is abnormal protein S activity abnormal lupus anticoagulant was on normal Antithrombin III was abnormal    Patient was seen in the emergency room May 30 for a seizure and currently is on Keppra 500 mg twice daily    Hypertensive cardiovascular disease with a blood pressure controlled on the current regimen    Obstructive sleep apnea the patient uses CPAP    The patient is anticoagulated with Eliquis  Immunizations and preventive care screenings were discussed with patient today. Appropriate education was printed on patient's after visit summary.    Counseling:  Alcohol/drug use: discussed moderation in alcohol intake, the recommendations for healthy alcohol use, and avoidance of illicit drug use.  Dental Health: discussed importance of regular tooth brushing, flossing, and dental visits.  Injury prevention: discussed safety/seat belts, safety helmets, smoke detectors, carbon dioxide detectors, and smoking near bedding or upholstery.  Sexual health: discussed sexually transmitted diseases, partner selection, use of condoms, avoidance of unintended pregnancy, and contraceptive alternatives.  Exercise: the importance of regular exercise/physical activity was discussed. Recommend exercise 3-5 times per week for at least 30 minutes.       Tobacco  "Cessation Counseling: Tobacco cessation counseling was not provided. The patient is sincerely urged to quit consumption of tobacco. He is not ready to quit tobacco. Medication options and side effects of medication not discussed. Patient agreed to medication.         History of Present Illness     Adult Annual Physical:  Patient presents for annual physical.     Diet and Physical Activity:  - Diet/Nutrition: well balanced diet.  - Exercise: walking, 5-7 times a week on average and 30-60 minutes on average.    General Health:  - Sleep: sleeps well and 7-8 hours of sleep on average.  - Hearing: normal hearing bilateral ears.  - Vision: vision problems.  - Dental: no dental visits for > 1 year, brushes teeth twice daily and does not floss.     Health:    - Urinary symptoms: none.     Advanced Care Planning:  - Has an advanced directive?: no    - Has a durable medical POA?: no    - ACP document given to patient?: no      Review of Systems   Constitutional:  Negative for chills and fever.   HENT:  Negative for ear pain and sore throat.    Eyes:  Negative for pain and visual disturbance.   Respiratory:  Negative for cough and shortness of breath.    Cardiovascular:  Negative for chest pain and palpitations.   Gastrointestinal:  Negative for abdominal pain and vomiting.   Genitourinary:  Negative for dysuria and hematuria.   Musculoskeletal:  Negative for arthralgias and back pain.   Skin:  Negative for color change and rash.   Neurological:  Negative for seizures and syncope.   All other systems reviewed and are negative.        Objective     /80   Pulse 84   Temp 98.2 °F (36.8 °C)   Resp 20   Ht 5' 8\" (1.727 m)   Wt (!) 142 kg (312 lb)   BMI 47.44 kg/m²     Physical Exam  Vitals and nursing note reviewed.   Constitutional:       General: He is not in acute distress.     Appearance: He is well-developed. He is obese.   HENT:      Head: Normocephalic and atraumatic.      Right Ear: Tympanic membrane, ear " canal and external ear normal.      Left Ear: Tympanic membrane, ear canal and external ear normal.      Nose: Nose normal.      Mouth/Throat:      Mouth: Mucous membranes are moist.      Pharynx: Oropharynx is clear.   Eyes:      Extraocular Movements: Extraocular movements intact.      Conjunctiva/sclera: Conjunctivae normal.      Pupils: Pupils are equal, round, and reactive to light.   Cardiovascular:      Rate and Rhythm: Normal rate and regular rhythm.      Heart sounds: No murmur heard.  Pulmonary:      Effort: Pulmonary effort is normal. No respiratory distress.      Breath sounds: Normal breath sounds.   Abdominal:      General: Abdomen is flat. Bowel sounds are normal.      Palpations: Abdomen is soft.      Tenderness: There is no abdominal tenderness.   Musculoskeletal:         General: No swelling. Normal range of motion.      Cervical back: Normal range of motion and neck supple.   Skin:     General: Skin is warm and dry.      Capillary Refill: Capillary refill takes less than 2 seconds.   Neurological:      General: No focal deficit present.      Mental Status: He is alert and oriented to person, place, and time.   Psychiatric:         Mood and Affect: Mood normal.         Behavior: Behavior normal.       Administrative Statements

## 2024-07-15 DIAGNOSIS — R93.89 ABNORMAL FINDING ON CT SCAN: ICD-10-CM

## 2024-07-15 DIAGNOSIS — G40.919 BREAKTHROUGH SEIZURE (HCC): ICD-10-CM

## 2024-07-16 RX ORDER — LEVETIRACETAM 500 MG/1
500 TABLET ORAL EVERY 12 HOURS SCHEDULED
Qty: 60 TABLET | Refills: 3 | Status: SHIPPED | OUTPATIENT
Start: 2024-07-16

## 2024-08-13 ENCOUNTER — HOSPITAL ENCOUNTER (EMERGENCY)
Facility: HOSPITAL | Age: 34
Discharge: HOME | End: 2024-08-13
Attending: EMERGENCY MEDICINE
Payer: COMMERCIAL

## 2024-08-13 ENCOUNTER — APPOINTMENT (EMERGENCY)
Dept: RADIOLOGY | Facility: HOSPITAL | Age: 34
End: 2024-08-13
Payer: COMMERCIAL

## 2024-08-13 VITALS
SYSTOLIC BLOOD PRESSURE: 159 MMHG | DIASTOLIC BLOOD PRESSURE: 95 MMHG | HEART RATE: 89 BPM | RESPIRATION RATE: 18 BRPM | TEMPERATURE: 97.5 F | OXYGEN SATURATION: 94 %

## 2024-08-13 DIAGNOSIS — Z23 TETANUS-DIPHTHERIA (TD) VACCINATION: ICD-10-CM

## 2024-08-13 DIAGNOSIS — S91.331A PUNCTURE WOUND OF PLANTAR ASPECT OF RIGHT FOOT, INITIAL ENCOUNTER: Primary | ICD-10-CM

## 2024-08-13 PROCEDURE — 99283 EMERGENCY DEPT VISIT LOW MDM: CPT

## 2024-08-13 PROCEDURE — 90715 TDAP VACCINE 7 YRS/> IM: CPT | Performed by: EMERGENCY MEDICINE

## 2024-08-13 PROCEDURE — 90471 IMMUNIZATION ADMIN: CPT

## 2024-08-13 PROCEDURE — 99284 EMERGENCY DEPT VISIT MOD MDM: CPT | Performed by: EMERGENCY MEDICINE

## 2024-08-13 PROCEDURE — 73630 X-RAY EXAM OF FOOT: CPT

## 2024-08-13 RX ADMIN — TETANUS TOXOID, REDUCED DIPHTHERIA TOXOID AND ACELLULAR PERTUSSIS VACCINE, ADSORBED 0.5 ML: 5; 2.5; 8; 8; 2.5 SUSPENSION INTRAMUSCULAR at 14:35

## 2024-08-13 NOTE — ED PROVIDER NOTES
History  Chief Complaint   Patient presents with    Foot Injury     Stepped on bhavesh nail. Penetrating wound between right great and 2nd toe. 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  He states the nail was quite bhavesh.  The nail went through his shoe into the webbing between his first and second toes.  Patient is on Eliquis for previous DVT.  Bleeding has subsided.  Has been compliant with his medication.  No other complaints.  Last tetanus was 2019 and given this is a high propensity/risk for tetanus, it is greater than 5 years so his tetanus will be updated today.          Prior to Admission Medications   Prescriptions Last Dose Informant Patient Reported? Taking?   HYDROcodone-acetaminophen (Norco) 5-325 mg per tablet   No No   Sig: Take 1 tablet by mouth every 6 (six) hours as needed for pain Max Daily Amount: 4 tablets   LYRICA 50 MG capsule   Yes No   Sig: Take 50 mg by mouth 2 (two) times a day   Patient not taking: Reported on 2024   VENTOLIN  (90 Base) MCG/ACT inhaler   Yes No   Sig: as needed   Patient not taking: Reported on 2024   amLODIPine (NORVASC) 5 mg tablet   No No   Sig: Take 1 tablet (5 mg total) by mouth daily   apixaban (ELIQUIS) 5 mg   No No   Sig: Take 1 tablet (5 mg total) by mouth 2 (two) times a day   apixaban (ELIQUIS) 5 mg   No No   Sig: Take 1 tablet (5 mg total) by mouth 2 (two) times a day   apixaban (Eliquis) 5 mg   No No   Sig: Take 2 tablets (10 mg total) by mouth 2 (two) times a day for 7 days, THEN 1 tablet (5 mg total) 2 (two) times a day for 23 days.   fluticasone (FLONASE) 50 mcg/act nasal spray   No No   Si spray into each nostril daily   Patient not taking: Reported on 2024   levETIRAcetam (Keppra) 500 mg tablet   No No   Sig: Take 1 tablet (500 mg total) by mouth every 12 (twelve) hours    loratadine (CLARITIN) 10 mg tablet   No No   Sig: Take 1 tablet (10 mg total) by mouth daily   Patient not taking: Reported on 5/11/2024      Facility-Administered Medications: None       Past Medical History:   Diagnosis Date    Asthma     Depression     Seizure (HCC)        History reviewed. No pertinent surgical history.    Family History   Problem Relation Age of Onset    Mental illness Mother     Mental illness Sister      I have reviewed and agree with the history as documented.    E-Cigarette/Vaping    E-Cigarette Use Never User      E-Cigarette/Vaping Substances     Social History     Tobacco Use    Smoking status: Every Day     Current packs/day: 1.00     Average packs/day: 1 pack/day for 12.6 years (12.6 ttl pk-yrs)     Types: Cigarettes     Start date: 2012    Smokeless tobacco: Current   Vaping Use    Vaping status: Never Used   Substance Use Topics    Alcohol use: Not Currently    Drug use: Not Currently     Comment: 05/16/2024-- Hx marijuana       Review of Systems   Constitutional:  Negative for activity change, appetite change, chills and fever.   HENT:  Negative for ear pain, hearing loss, rhinorrhea, sneezing, sore throat and trouble swallowing.    Eyes:  Negative for pain and visual disturbance.   Respiratory:  Negative for cough, choking, chest tightness, shortness of breath, wheezing and stridor.    Cardiovascular:  Negative for chest pain, palpitations and leg swelling.   Gastrointestinal:  Negative for abdominal pain, constipation, diarrhea, nausea and vomiting.   Genitourinary:  Negative for difficulty urinating, dysuria, frequency, hematuria and testicular pain.   Musculoskeletal:  Negative for arthralgias, back pain, gait problem and neck pain.   Skin:  Negative for color change and rash.   Allergic/Immunologic: Negative for immunocompromised state.   Neurological:  Negative for dizziness, seizures, syncope, speech difficulty, weakness, light-headedness, numbness and headaches.   All  other systems reviewed and are negative.      Physical Exam  Physical Exam  Vitals and nursing note reviewed.   Constitutional:       General: He is not in acute distress.     Appearance: Normal appearance. He is well-developed and normal weight. He is not ill-appearing, toxic-appearing or diaphoretic.   HENT:      Head: Normocephalic and atraumatic.      Nose: Nose normal.      Mouth/Throat:      Mouth: Mucous membranes are moist.      Pharynx: Oropharynx is clear.   Eyes:      General: No scleral icterus.     Extraocular Movements: Extraocular movements intact.      Conjunctiva/sclera: Conjunctivae normal.   Cardiovascular:      Rate and Rhythm: Normal rate and regular rhythm.      Pulses: Normal pulses.      Heart sounds: Normal heart sounds. No murmur heard.  Pulmonary:      Effort: Pulmonary effort is normal. No respiratory distress.      Breath sounds: Normal breath sounds. No wheezing or rales.   Chest:      Chest wall: No tenderness.   Abdominal:      General: Bowel sounds are normal. There is no distension.      Palpations: Abdomen is soft. There is no mass.      Tenderness: There is no abdominal tenderness. There is no right CVA tenderness, left CVA tenderness, guarding or rebound.   Musculoskeletal:         General: No tenderness, deformity or signs of injury. Normal range of motion.      Cervical back: Normal range of motion and neck supple. No rigidity or tenderness.      Right lower leg: No edema.      Left lower leg: No edema.      Comments: Small puncture wound with no bleeding webbing between the right big toe and second toe.   Lymphadenopathy:      Cervical: No cervical adenopathy.   Skin:     General: Skin is warm and dry.      Capillary Refill: Capillary refill takes less than 2 seconds.      Coloration: Skin is not jaundiced or pale.      Findings: No bruising, erythema, lesion or rash.   Neurological:      General: No focal deficit present.      Mental Status: He is alert and oriented to  person, place, and time. Mental status is at baseline.      Motor: No abnormal muscle tone.   Psychiatric:         Mood and Affect: Mood normal.         Behavior: Behavior normal.         Vital Signs  ED Triage Vitals [08/13/24 1404]   Temperature Pulse Respirations Blood Pressure SpO2   97.5 °F (36.4 °C) 89 18 159/95 94 %      Temp Source Heart Rate Source Patient Position - Orthostatic VS BP Location FiO2 (%)   Temporal Monitor Sitting Right arm --      Pain Score       4           Vitals:    08/13/24 1404   BP: 159/95   Pulse: 89   Patient Position - Orthostatic VS: Sitting         Visual Acuity      ED Medications  Medications   tetanus-diphtheria-acellular pertussis (BOOSTRIX) IM injection 0.5 mL (0.5 mL Intramuscular Given 8/13/24 1435)       Diagnostic Studies  Results Reviewed       None                   XR foot 3+ views RIGHT   Final Result by Eber Steel MD (08/13 1543)      No acute osseous abnormality nor foreign body.         Computerized Assisted Algorithm (CAA) may have been used to analyze all applicable images.         Workstation performed: [id number]                Wet read: No acute fracture, no radiopaque foreign bodies noted.    Procedures  Procedures         ED Course                                 SBIRT 22yo+      Flowsheet Row Most Recent Value   Initial Alcohol Screen: US AUDIT-C     1. How often do you have a drink containing alcohol? 0 Filed at: 08/13/2024 1406   2. How many drinks containing alcohol do you have on a typical day you are drinking?  0 Filed at: 08/13/2024 1406   3a. Male UNDER 65: How often do you have five or more drinks on one occasion? 0 Filed at: 08/13/2024 1406   Audit-C Score 0 Filed at: 08/13/2024 1406   TRE: How many times in the past year have you...    Used an illegal drug or used a prescription medication for non-medical reasons? Never Filed at: 08/13/2024 1406                      Medical Decision Making  33-year-old male presenting after he stepped on  a nail struck him between the first and second toes and the plantar aspect     Problems Addressed:  Puncture wound of plantar aspect of right foot, initial encounter: acute illness or injury     Details: Tennis slightly more than 5 years so will be updated today.    Amount and/or Complexity of Data Reviewed  External Data Reviewed: notes.  Radiology: ordered and independent interpretation performed. Decision-making details documented in ED Course.     Details: My independent interpretation of the right foot x-ray is negative for any acute fracture, radiopaque foreign body    Risk  OTC drugs.  Prescription drug management.  Risk Details: Tetanus updated, wound cleansed.                 Disposition  Final diagnoses:   Puncture wound of plantar aspect of right foot, initial encounter   Tetanus-diphtheria (Td) vaccination     Time reflects when diagnosis was documented in both MDM as applicable and the Disposition within this note       Time User Action Codes Description Comment    8/13/2024  3:44 PM Barrett Mendieta Add [S91.331A] Puncture wound of plantar aspect of right foot, initial encounter     8/13/2024  4:00 PM Barrett Mendieta Add [Z23] Tetanus-diphtheria (Td) vaccination           ED Disposition       ED Disposition   Discharge    Condition   Stable    Date/Time   Tue Aug 13, 2024 1544    Comment   Joe Milian discharge to home/self care.                   Follow-up Information       Follow up With Specialties Details Why Contact Info    Warren Head,  Family Medicine Schedule an appointment as soon as possible for a visit   [address] [place] [id number]  [phone]              Current Discharge Medication List        CONTINUE these medications which have NOT CHANGED    Details   amLODIPine (NORVASC) 5 mg tablet Take 1 tablet (5 mg total) by mouth daily  Qty: 90 tablet, Refills: 0    Associated Diagnoses: Elevated blood pressure reading      !! apixaban (ELIQUIS) 5 mg Take 1  tablet (5 mg total) by mouth 2 (two) times a day  Qty: 180 tablet, Refills: 0    Associated Diagnoses: Acute deep vein thrombosis (DVT) of left lower extremity (HCC)      !! apixaban (ELIQUIS) 5 mg Take 1 tablet (5 mg total) by mouth 2 (two) times a day  Qty: 56 tablet, Refills: 0    Associated Diagnoses: Acute deep vein thrombosis (DVT) of left lower extremity (HCC)      fluticasone (FLONASE) 50 mcg/act nasal spray 1 spray into each nostril daily  Qty: 16 g, Refills: 0    Associated Diagnoses: Middle ear effusion, bilateral      HYDROcodone-acetaminophen (Norco) 5-325 mg per tablet Take 1 tablet by mouth every 6 (six) hours as needed for pain Max Daily Amount: 4 tablets  Qty: 8 tablet, Refills: 0    Associated Diagnoses: Acute deep vein thrombosis (DVT) of distal vein of left lower extremity (HCC)      levETIRAcetam (Keppra) 500 mg tablet Take 1 tablet (500 mg total) by mouth every 12 (twelve) hours  Qty: 60 tablet, Refills: 3    Associated Diagnoses: Breakthrough seizure (HCC); Abnormal finding on CT scan      loratadine (CLARITIN) 10 mg tablet Take 1 tablet (10 mg total) by mouth daily  Qty: 20 tablet, Refills: 0    Associated Diagnoses: Middle ear effusion, bilateral      LYRICA 50 MG capsule Take 50 mg by mouth 2 (two) times a day  Refills: 0      VENTOLIN  (90 Base) MCG/ACT inhaler as needed  Refills: 1    Comments: <!--EPICS-->Substitution to a formulary equivalent within the same pharmaceutical class is authorized.<!--EPICE-->       !! - Potential duplicate medications found. Please discuss with provider.          No discharge procedures on file.    PDMP Review         Value Time User    PDMP Reviewed  Yes 5/16/2024  1:23 PM Warren Head DO            ED Provider  Electronically Signed by             Barrett Mendieta DO  08/13/24 9432

## 2024-08-14 ENCOUNTER — TELEPHONE (OUTPATIENT)
Age: 34
End: 2024-08-14

## 2024-08-14 NOTE — TELEPHONE ENCOUNTER
Patient's sister called asking if the office can complete the paperwork for [employer] not to shut off his electric because of his C-Pap machine    Brigida Jessamine  [address] [place] [facility] [place] [address]  Acct #[id number]    Please notify patient once paperwork is sent

## 2024-09-13 ENCOUNTER — HOSPITAL ENCOUNTER (EMERGENCY)
Facility: HOSPITAL | Age: 34
Discharge: HOME | End: 2024-09-13
Attending: EMERGENCY MEDICINE
Payer: COMMERCIAL

## 2024-09-13 ENCOUNTER — APPOINTMENT (EMERGENCY)
Dept: CT IMAGING | Facility: HOSPITAL | Age: 34
End: 2024-09-13
Payer: COMMERCIAL

## 2024-09-13 VITALS
DIASTOLIC BLOOD PRESSURE: 60 MMHG | RESPIRATION RATE: 20 BRPM | SYSTOLIC BLOOD PRESSURE: 131 MMHG | OXYGEN SATURATION: 97 % | BODY MASS INDEX: 47.29 KG/M2 | WEIGHT: 312 LBS | HEIGHT: 68 IN | HEART RATE: 67 BPM | TEMPERATURE: 98.2 F

## 2024-09-13 DIAGNOSIS — G40.919 BREAKTHROUGH SEIZURE (HCC): Primary | ICD-10-CM

## 2024-09-13 LAB
ALBUMIN SERPL BCG-MCNC: 4.4 G/DL (ref 3.5–5)
ALP SERPL-CCNC: 76 U/L (ref 34–104)
ALT SERPL W P-5'-P-CCNC: 31 U/L (ref 7–52)
ANION GAP SERPL CALCULATED.3IONS-SCNC: 8 MMOL/L (ref 4–13)
AST SERPL W P-5'-P-CCNC: 21 U/L (ref 13–39)
BASOPHILS # BLD AUTO: 0.09 THOUSANDS/ΜL (ref 0–0.1)
BASOPHILS NFR BLD AUTO: 1 % (ref 0–1)
BILIRUB SERPL-MCNC: 0.31 MG/DL (ref 0.2–1)
BUN SERPL-MCNC: 12 MG/DL (ref 5–25)
CALCIUM SERPL-MCNC: 9.7 MG/DL (ref 8.4–10.2)
CHLORIDE SERPL-SCNC: 101 MMOL/L (ref 96–108)
CO2 SERPL-SCNC: 29 MMOL/L (ref 21–32)
CREAT SERPL-MCNC: 0.95 MG/DL (ref 0.6–1.3)
EOSINOPHIL # BLD AUTO: 0.34 THOUSAND/ΜL (ref 0–0.61)
EOSINOPHIL NFR BLD AUTO: 3 % (ref 0–6)
ERYTHROCYTE [DISTWIDTH] IN BLOOD BY AUTOMATED COUNT: 12.7 % (ref 11.6–15.1)
GFR SERPL CREATININE-BSD FRML MDRD: 105 ML/MIN/1.73SQ M
GLUCOSE SERPL-MCNC: 78 MG/DL (ref 65–140)
HCT VFR BLD AUTO: 49.6 % (ref 36.5–49.3)
HGB BLD-MCNC: 16.9 G/DL (ref 12–17)
IMM GRANULOCYTES # BLD AUTO: 0.04 THOUSAND/UL (ref 0–0.2)
IMM GRANULOCYTES NFR BLD AUTO: 0 % (ref 0–2)
LYMPHOCYTES # BLD AUTO: 2.64 THOUSANDS/ΜL (ref 0.6–4.47)
LYMPHOCYTES NFR BLD AUTO: 27 % (ref 14–44)
MCH RBC QN AUTO: 30.5 PG (ref 26.8–34.3)
MCHC RBC AUTO-ENTMCNC: 34.1 G/DL (ref 31.4–37.4)
MCV RBC AUTO: 90 FL (ref 82–98)
MONOCYTES # BLD AUTO: 0.7 THOUSAND/ΜL (ref 0.17–1.22)
MONOCYTES NFR BLD AUTO: 7 % (ref 4–12)
NEUTROPHILS # BLD AUTO: 6.17 THOUSANDS/ΜL (ref 1.85–7.62)
NEUTS SEG NFR BLD AUTO: 62 % (ref 43–75)
NRBC BLD AUTO-RTO: 0 /100 WBCS
PLATELET # BLD AUTO: 231 THOUSANDS/UL (ref 149–390)
PMV BLD AUTO: 9.9 FL (ref 8.9–12.7)
POTASSIUM SERPL-SCNC: 3.8 MMOL/L (ref 3.5–5.3)
PROT SERPL-MCNC: 7.7 G/DL (ref 6.4–8.4)
RBC # BLD AUTO: 5.54 MILLION/UL (ref 3.88–5.62)
SODIUM SERPL-SCNC: 138 MMOL/L (ref 135–147)
WBC # BLD AUTO: 9.98 THOUSAND/UL (ref 4.31–10.16)

## 2024-09-13 PROCEDURE — 99284 EMERGENCY DEPT VISIT MOD MDM: CPT | Performed by: EMERGENCY MEDICINE

## 2024-09-13 PROCEDURE — 96360 HYDRATION IV INFUSION INIT: CPT

## 2024-09-13 PROCEDURE — 80053 COMPREHEN METABOLIC PANEL: CPT | Performed by: EMERGENCY MEDICINE

## 2024-09-13 PROCEDURE — 99284 EMERGENCY DEPT VISIT MOD MDM: CPT

## 2024-09-13 PROCEDURE — 36415 COLL VENOUS BLD VENIPUNCTURE: CPT | Performed by: EMERGENCY MEDICINE

## 2024-09-13 PROCEDURE — 85025 COMPLETE CBC W/AUTO DIFF WBC: CPT | Performed by: EMERGENCY MEDICINE

## 2024-09-13 PROCEDURE — 70450 CT HEAD/BRAIN W/O DYE: CPT

## 2024-09-13 RX ORDER — ACETAMINOPHEN 325 MG/1
975 TABLET ORAL ONCE
Status: COMPLETED | OUTPATIENT
Start: 2024-09-13 | End: 2024-09-13

## 2024-09-13 RX ADMIN — SODIUM CHLORIDE 1000 ML: 0.9 INJECTION, SOLUTION INTRAVENOUS at 14:20

## 2024-09-13 RX ADMIN — ACETAMINOPHEN 975 MG: 325 TABLET ORAL at 14:20

## 2024-09-13 NOTE — ED PROVIDER NOTES
Final Diagnosis:  1. Breakthrough seizure (HCC)        MDM:  -Patient presents after seizure-like activity.  Likely breakthrough seizure.  Will evaluate for anemia, electrolyte disturbances.  Patient states that he was recently started on an anticoagulant medication for a blood clot and that he has follow-up at some point but is not sure when or where.  - Given that he had a significant head pain, seizure activity, and now anticoagulant use will provide CT head to evaluate for bleed or ischemia though I believe this to be less likely.  Will provide analgesia otherwise.  -Laboratory analysis unremarkable.  CT without acute pathology.  Reviewed results with patient.  Discussed further observation versus discharge.  Patient comfortable with discharge at this time.    Chief Complaint   Patient presents with    Seizure - Prior Hx Of     Patients reports hx of seizures and that he will get lightheaded and headache prior to it starting. States that feeling for 20 minutes. Patient alert and oriented upon arrival with no seizure like activity noted.      HPI  Patient presents for seizure-like activity.  Patient states that he has a history of seizure.  Patient states that he had 1 earlier today while he was sitting in a chair.  Denies any falls or head trauma.  States that he has a headache now afterwards.  States that sometimes he does get headaches after his seizures but is not always predictable.  Patient states that sometimes increased anxiety can trigger his seizures.  He believes that he has 1 or 2 breakthrough seizures a year.  He states that he has a lot of anxiety currently because his daughter recently moved to [place] as it sounds like she was removed from his care.  He states that he is currently also working with family of fixing up the house and living with multiple other family members which has increased his anxiety as well.  Patient is unsure of the medications he takes but states that he takes them all the  "time and takes them every morning and evening as indicated.      Unless otherwise specified:  - No language barrier.   - History obtained from patient.   - There are no limitations to the history obtained.  - Previous charting was reviewed    PMH:   has a past medical history of Asthma, Depression, and Seizure (HCC).    PSH:   has no past surgical history on file.       ROS:  Review of Systems   - 13 point ROS was performed and all are normal unless stated in the history above.   - Nursing note reviewed. Vitals reviewed.   - Orders placed by myself and/or advanced practitioner / resident.        PE:   Vitals:    09/13/24 1358 09/13/24 1409 09/13/24 1430 09/13/24 1530   BP: 148/82 141/80 140/69 131/60   BP Location: Left arm  Left arm    Pulse: 85 79 72 67   Resp: 18 18 18 20   Temp: 98.2 °F (36.8 °C)      TempSrc: Temporal      SpO2: 96% 96% 96% 97%   Weight: (!) 142 kg (312 lb)      Height: 5' 8\" (1.727 m)        Vitals reviewed by me.     No focal neurological deficits on exam.  Moving all extremities without issue.  No signs of trauma.  Unless otherwise specified above:    General: VS reviewed  Appears in NAD    Head: Normocephalic, atraumatic  Eyes: EOM-I.     ENT: Atraumatic external nose and ears.    No drooling.     Neck: No JVD.    CV: No pallor noted  Lungs:   No tachypnea  No respiratory distress    Abdomen:  Soft, non-tender, non-distended    MSK:   No obvious deformity    Skin: Dry, intact. No obvious rash.    Psychiatric/Behavioral: Appropriate mood and affect   Exam: deferred    Physical Exam     Procedures   - Nursing note reviewed.                      CT head without contrast   Final Result      No acute intracranial abnormality. Cerebellar tonsils are again low-lying, indicative of a Chiari I malformation.      Paranasal sinus disease as described above, worse when compared to the prior examination.      The study was marked in EPIC for immediate notification.                  Workstation " performed: [id number]           Orders Placed This Encounter   Procedures    CT head without contrast    CBC and differential    Comprehensive metabolic panel    Insert peripheral IV     Labs Reviewed   CBC AND DIFFERENTIAL - Abnormal       Result Value Ref Range Status    WBC 9.98  4.31 - 10.16 Thousand/uL Final    RBC 5.54  3.88 - 5.62 Million/uL Final    Hemoglobin 16.9  12.0 - 17.0 g/dL Final    Hematocrit 49.6 (*) 36.5 - 49.3 % Final    MCV 90  82 - 98 fL Final    MCH 30.5  26.8 - 34.3 pg Final    MCHC 34.1  31.4 - 37.4 g/dL Final    RDW 12.7  11.6 - 15.1 % Final    MPV 9.9  8.9 - 12.7 fL Final    Platelets 231  149 - 390 Thousands/uL Final    nRBC 0  /100 WBCs Final    Segmented % 62  43 - 75 % Final    Immature Grans % 0  0 - 2 % Final    Lymphocytes % 27  14 - 44 % Final    Monocytes % 7  4 - 12 % Final    Eosinophils Relative 3  0 - 6 % Final    Basophils Relative 1  0 - 1 % Final    Absolute Neutrophils 6.17  1.85 - 7.62 Thousands/µL Final    Absolute Immature Grans 0.04  0.00 - 0.20 Thousand/uL Final    Absolute Lymphocytes 2.64  0.60 - 4.47 Thousands/µL Final    Absolute Monocytes 0.70  0.17 - 1.22 Thousand/µL Final    Eosinophils Absolute 0.34  0.00 - 0.61 Thousand/µL Final    Basophils Absolute 0.09  0.00 - 0.10 Thousands/µL Final   COMPREHENSIVE METABOLIC PANEL    Sodium 138  135 - 147 mmol/L Final    Potassium 3.8  3.5 - 5.3 mmol/L Final    Chloride 101  96 - 108 mmol/L Final    CO2 29  21 - 32 mmol/L Final    ANION GAP 8  4 - 13 mmol/L Final    BUN 12  5 - 25 mg/dL Final    Creatinine 0.95  0.60 - 1.30 mg/dL Final    Comment: Standardized to IDMS reference method    Glucose 78  65 - 140 mg/dL Final    Comment: If the patient is fasting, the ADA then defines impaired fasting glucose as > 100 mg/dL and diabetes as > or equal to 123 mg/dL.    Calcium 9.7  8.4 - 10.2 mg/dL Final    AST 21  13 - 39 U/L Final    ALT 31  7 - 52 U/L Final    Comment: Specimen collection should occur prior to Sulfasalazine  administration due to the potential for falsely depressed results.     Alkaline Phosphatase 76  34 - 104 U/L Final    Total Protein 7.7  6.4 - 8.4 g/dL Final    Albumin 4.4  3.5 - 5.0 g/dL Final    Total Bilirubin 0.31  0.20 - 1.00 mg/dL Final    Comment: Use of this assay is not recommended for patients undergoing treatment with eltrombopag due to the potential for falsely elevated results.  N-acetyl-p-benzoquinone imine (metabolite of Acetaminophen) will generate erroneously low results in samples for patients that have taken an overdose of Acetaminophen.    eGFR 105  ml/min/1.73sq m Final    Narrative:     National Kidney Disease Foundation guidelines for Chronic Kidney Disease (CKD):     Stage 1 with normal or high GFR (GFR > 90 mL/min/1.73 square meters)    Stage 2 Mild CKD (GFR = 60-89 mL/min/1.73 square meters)    Stage 3A Moderate CKD (GFR = 45-59 mL/min/1.73 square meters)    Stage 3B Moderate CKD (GFR = 30-44 mL/min/1.73 square meters)    Stage 4 Severe CKD (GFR = 15-29 mL/min/1.73 square meters)    Stage 5 End Stage CKD (GFR <15 mL/min/1.73 square meters)  Note: GFR calculation is accurate only with a steady state creatinine         Final Diagnosis:  1. Breakthrough seizure (HCC)              Unless otherwise noted the patient's medications were reviewed and they should continue as directed.    Unless otherwise specified:  CC is exclusive from any separately billable procedures  CC is exclusive of treating other patients  CC is exclusive of teaching time   All splints are static    Medications   sodium chloride 0.9 % bolus 1,000 mL (0 mL Intravenous Stopped 9/13/24 1543)   acetaminophen (TYLENOL) tablet 975 mg (975 mg Oral Given 9/13/24 1420)     Time reflects when diagnosis was documented in both MDM as applicable and the Disposition within this note       Time User Action Codes Description Comment    9/13/2024  3:47 PM Neymar Ceja Add [G40.919] Breakthrough seizure (HCC)           ED Disposition        ED Disposition   Discharge    Condition   Stable    Date/Time   Fri Sep 13, 2024  3:47 PM    Comment   Joe Milian discharge to home/self care.                   Follow-up Information       Follow up With Specialties Details Why Contact Josh Head DO [address] [address]  [phone]            Patient's Medications   Discharge Prescriptions    No medications on file     No discharge procedures on file.  Prior to Admission Medications   Prescriptions Last Dose Informant Patient Reported? Taking?   HYDROcodone-acetaminophen (Norco) 5-325 mg per tablet   No No   Sig: Take 1 tablet by mouth every 6 (six) hours as needed for pain Max Daily Amount: 4 tablets   LYRICA 50 MG capsule   Yes No   Sig: Take 50 mg by mouth 2 (two) times a day   Patient not taking: Reported on 2024   VENTOLIN  (90 Base) MCG/ACT inhaler   Yes No   Sig: as needed   Patient not taking: Reported on 2024   amLODIPine (NORVASC) 5 mg tablet   No No   Sig: Take 1 tablet (5 mg total) by mouth daily   apixaban (ELIQUIS) 5 mg   No No   Sig: Take 1 tablet (5 mg total) by mouth 2 (two) times a day   apixaban (ELIQUIS) 5 mg   No No   Sig: Take 1 tablet (5 mg total) by mouth 2 (two) times a day   apixaban (Eliquis) 5 mg   No No   Sig: Take 2 tablets (10 mg total) by mouth 2 (two) times a day for 7 days, THEN 1 tablet (5 mg total) 2 (two) times a day for 23 days.   fluticasone (FLONASE) 50 mcg/act nasal spray   No No   Si spray into each nostril daily   Patient not taking: Reported on 2024   levETIRAcetam (Keppra) 500 mg tablet   No No   Sig: Take 1 tablet (500 mg total) by mouth every 12 (twelve) hours   loratadine (CLARITIN) 10 mg tablet   No No   Sig: Take 1 tablet (10 mg total) by mouth daily   Patient not taking: Reported on 2024      Facility-Administered Medications: None       Portions of the record may have been created with voice recognition software.  "Occasional wrong word or \"sound a like\" substitutions may have occurred due to the inherent limitations of voice recognition software. Read the chart carefully and recognize, using context, where substitutions have occurred.    Electronically signed by:  MD Neymar Wilkins MD  09/13/24 1168    "

## 2024-09-21 DIAGNOSIS — R93.89 ABNORMAL FINDING ON CT SCAN: ICD-10-CM

## 2024-09-21 DIAGNOSIS — G40.919 BREAKTHROUGH SEIZURE (HCC): ICD-10-CM

## 2024-09-23 RX ORDER — LEVETIRACETAM 500 MG/1
500 TABLET ORAL EVERY 12 HOURS
Qty: 180 TABLET | Refills: 1 | Status: SHIPPED | OUTPATIENT
Start: 2024-09-23

## 2024-10-18 ENCOUNTER — OFFICE VISIT (OUTPATIENT)
Dept: FAMILY MEDICINE CLINIC | Facility: CLINIC | Age: 34
End: 2024-10-18
Payer: COMMERCIAL

## 2024-10-18 VITALS
HEART RATE: 84 BPM | RESPIRATION RATE: 20 BRPM | TEMPERATURE: 99 F | SYSTOLIC BLOOD PRESSURE: 164 MMHG | DIASTOLIC BLOOD PRESSURE: 84 MMHG | WEIGHT: 315 LBS | HEIGHT: 68 IN | BODY MASS INDEX: 47.74 KG/M2

## 2024-10-18 DIAGNOSIS — J40 BRONCHITIS: ICD-10-CM

## 2024-10-18 DIAGNOSIS — L40.9 PSORIASIS: ICD-10-CM

## 2024-10-18 DIAGNOSIS — D68.9 COAGULOPATHY (HCC): ICD-10-CM

## 2024-10-18 DIAGNOSIS — Z86.718 HISTORY OF DVT (DEEP VEIN THROMBOSIS): ICD-10-CM

## 2024-10-18 DIAGNOSIS — G40.919 BREAKTHROUGH SEIZURE (HCC): ICD-10-CM

## 2024-10-18 DIAGNOSIS — F81.9 INTELLECTUAL DELAY: ICD-10-CM

## 2024-10-18 DIAGNOSIS — R03.0 ELEVATED BLOOD PRESSURE READING: Primary | ICD-10-CM

## 2024-10-18 PROCEDURE — 99214 OFFICE O/P EST MOD 30 MIN: CPT | Performed by: FAMILY MEDICINE

## 2024-10-18 RX ORDER — LEVETIRACETAM 500 MG/1
500 TABLET ORAL EVERY 12 HOURS
Qty: 180 TABLET | Refills: 1 | Status: CANCELLED | OUTPATIENT
Start: 2024-10-18

## 2024-10-18 RX ORDER — AZITHROMYCIN 250 MG/1
TABLET, FILM COATED ORAL
Qty: 6 TABLET | Refills: 0 | Status: SHIPPED | OUTPATIENT
Start: 2024-10-18 | End: 2024-10-23

## 2024-10-18 RX ORDER — LEVETIRACETAM 500 MG/1
500 TABLET ORAL EVERY 12 HOURS
Qty: 180 TABLET | Refills: 1 | Status: SHIPPED | OUTPATIENT
Start: 2024-10-18

## 2024-10-18 RX ORDER — TRIAMCINOLONE ACETONIDE 1 MG/G
CREAM TOPICAL 2 TIMES DAILY
Qty: 89 G | Refills: 3 | Status: SHIPPED | OUTPATIENT
Start: 2024-10-18

## 2024-10-18 RX ORDER — DEXTROMETHORPHAN HYDROBROMIDE AND PROMETHAZINE HYDROCHLORIDE 15; 6.25 MG/5ML; MG/5ML
5 SYRUP ORAL 4 TIMES DAILY PRN
Qty: 180 ML | Refills: 1 | Status: SHIPPED | OUTPATIENT
Start: 2024-10-18

## 2024-10-18 RX ORDER — AMLODIPINE BESYLATE 5 MG/1
5 TABLET ORAL DAILY
Qty: 90 TABLET | Refills: 1 | Status: SHIPPED | OUTPATIENT
Start: 2024-10-18 | End: 2025-04-16

## 2024-10-18 NOTE — ASSESSMENT & PLAN NOTE
The patient has difficulty reading and was taking his Eliquis once daily I did instruct the patient that it is a twice a day medication he will return to taking it twice daily

## 2024-10-18 NOTE — PROGRESS NOTES
Ambulatory Visit  Name: Joe Milian      : 1990      MRN: [id number]  Encounter Provider: Warren Head DO  Encounter Date: 10/18/2024   Encounter department: Replaced by [facility]    Assessment & Plan  Elevated blood pressure reading  The patient is to continue taking Norvasc 5 mg daily and decrease his salt intake.  He is to monitor his blood pressure and contact us if the systolic is above 130 or the diastolic above 80  Orders:    amLODIPine (NORVASC) 5 mg tablet; Take 1 tablet (5 mg total) by mouth daily    Breakthrough seizure (HCC)  The patient is taking Keppra 500 mg twice daily we will refer to neurology for evaluationOrders:    levETIRAcetam (KEPPRA) 500 mg tablet; Take 1 tablet (500 mg total) by mouth every 12 (twelve) hours    Intellectual delay  The patient has difficulty reading and was taking his Eliquis once daily I did instruct the patient that it is a twice a day medication he will return to taking it twice daily       Coagulopathy (HCC)  The patient missed his appointment with hematology will be we referring him       History of DVT (deep vein thrombosis)  The patient will be taking Eliquis 5 mg twice daily  Orders:    apixaban (ELIQUIS) 2.5 mg; Take 1 tablet (2.5 mg total) by mouth 2 (two) times a day    Bronchitis  We will provide Zithromax in the form of a Z-Ced  Orders:    azithromycin (Zithromax) 250 mg tablet; Take 2 tablets (500 mg total) by mouth daily for 1 day, THEN 1 tablet (250 mg total) daily for 4 days.    promethazine-dextromethorphan (PHENERGAN-DM) 6.25-15 mg/5 mL oral syrup; Take 5 mL by mouth 4 (four) times a day as needed for cough    Psoriasis  Will add Kenalog to apply twice daily  Orders:    triamcinolone (KENALOG) 0.1 % cream; Apply topically 2 (two) times a day       History of Present Illness     Patient presents with chief complaint of cough productive of yellowish phlegm.  The patient also needs renewals on Eliquis and Keppra.  The patient  "also complains of a psoriatic patch on the elbows    Cough  Associated symptoms include a rash. Pertinent negatives include no chest pain, chills, ear pain, fever, sore throat or shortness of breath.         Review of Systems   Constitutional:  Negative for chills and fever.   HENT:  Negative for ear pain and sore throat.    Eyes:  Negative for pain and visual disturbance.   Respiratory:  Positive for cough. Negative for shortness of breath.         Cough productive of yellowish phlegm   Cardiovascular:  Negative for chest pain and palpitations.   Gastrointestinal:  Negative for abdominal pain and vomiting.   Genitourinary:  Negative for dysuria and hematuria.   Musculoskeletal:  Negative for arthralgias and back pain.   Skin:  Positive for rash. Negative for color change.        Psoriatic patch on the elbows   Neurological:  Negative for seizures and syncope.   All other systems reviewed and are negative.          Objective     /84   Pulse 84   Temp 99 °F (37.2 °C)   Resp 20   Ht 5' 8\" (1.727 m)   Wt (!) 145 kg (320 lb)   BMI 48.66 kg/m²     Physical Exam  Constitutional:       Appearance: Normal appearance. He is obese.   HENT:      Head: Normocephalic and atraumatic.      Right Ear: Tympanic membrane, ear canal and external ear normal.      Left Ear: Tympanic membrane, ear canal and external ear normal.      Nose: Nose normal.      Mouth/Throat:      Mouth: Mucous membranes are moist.      Pharynx: Oropharynx is clear.   Eyes:      Extraocular Movements: Extraocular movements intact.      Conjunctiva/sclera: Conjunctivae normal.      Pupils: Pupils are equal, round, and reactive to light.   Cardiovascular:      Rate and Rhythm: Normal rate and regular rhythm.      Pulses: Normal pulses.      Heart sounds: Normal heart sounds.   Pulmonary:      Effort: Pulmonary effort is normal.      Breath sounds: Normal breath sounds.   Abdominal:      General: Abdomen is flat. Bowel sounds are normal.      " Palpations: Abdomen is soft.   Musculoskeletal:         General: Normal range of motion.      Cervical back: Normal range of motion.   Skin:     General: Skin is warm and dry.      Capillary Refill: Capillary refill takes less than 2 seconds.   Neurological:      General: No focal deficit present.      Mental Status: He is alert and oriented to person, place, and time.   Psychiatric:         Mood and Affect: Mood normal.         Behavior: Behavior normal.

## 2024-10-23 ENCOUNTER — TELEPHONE (OUTPATIENT)
Age: 34
End: 2024-10-23

## 2024-10-23 NOTE — TELEPHONE ENCOUNTER
Patient called requesting refill for apixaban (ELIQUIS) 2.5 mg . Patient made aware medication was refilled on ELIQUIS for 180 with 1 refills to [facility] pharmacy. Patient instructed to contact the pharmacy to obtain refills of medication. Patient verbalized understanding.

## 2025-01-08 ENCOUNTER — OFFICE VISIT (OUTPATIENT)
Dept: FAMILY MEDICINE CLINIC | Facility: CLINIC | Age: 35
End: 2025-01-08
Payer: COMMERCIAL

## 2025-01-08 VITALS
RESPIRATION RATE: 28 BRPM | HEART RATE: 96 BPM | SYSTOLIC BLOOD PRESSURE: 154 MMHG | WEIGHT: 310 LBS | HEIGHT: 68 IN | DIASTOLIC BLOOD PRESSURE: 88 MMHG | BODY MASS INDEX: 46.98 KG/M2 | TEMPERATURE: 97.2 F

## 2025-01-08 DIAGNOSIS — M25.571 ACUTE RIGHT ANKLE PAIN: Primary | ICD-10-CM

## 2025-01-08 DIAGNOSIS — Z86.718 HISTORY OF DVT (DEEP VEIN THROMBOSIS): ICD-10-CM

## 2025-01-08 DIAGNOSIS — D68.9 COAGULOPATHY (HCC): ICD-10-CM

## 2025-01-08 DIAGNOSIS — R03.0 ELEVATED BLOOD PRESSURE READING: ICD-10-CM

## 2025-01-08 DIAGNOSIS — R56.9 SEIZURE (HCC): ICD-10-CM

## 2025-01-08 PROCEDURE — 99214 OFFICE O/P EST MOD 30 MIN: CPT | Performed by: FAMILY MEDICINE

## 2025-01-08 NOTE — PROGRESS NOTES
Name: Joe Milian      : 1990      MRN: [id number]  Encounter Provider: Warren Head DO  Encounter Date: 2025   Encounter department: [facility]  :  Assessment & Plan  Acute right ankle pain  We will obtain an x-ray of the right ankle laboratory will include a uric acid and a C-reactive protein.  Will treated with Voltaren gel topically.  Orders:  •  XR ankle 3+ vw right; Future  •  CBC and differential; Future  •  Comprehensive metabolic panel; Future  •  C-reactive protein; Future  •  Uric acid; Future  •  Diclofenac Sodium (VOLTAREN) 1 %; Apply 2 g topically 4 (four) times a day  •  XR ankle 2 vw right; Future    Elevated blood pressure reading  Patient is to monitor pressure and contact us if the systolic is above 130 or the diastolic above 80       Coagulopathy (HCC)  On Eliquis for 2.5 mg twice daily       History of DVT (deep vein thrombosis)  Doppler exam reviewed       Seizure (HCC)  Patient has been seizure-free on Keppra              History of Present Illness     Patient denies any trauma however states that yesterday began to get sharp pain in his right ankle.  The patient has a history of a DVT in the left lower extremity and currently is on Eliquis    Ankle Pain       Review of Systems   Constitutional:  Negative for chills and fever.   HENT:  Negative for ear pain and sore throat.    Eyes:  Negative for pain and visual disturbance.   Respiratory:  Negative for cough and shortness of breath.    Cardiovascular:  Negative for chest pain and palpitations.   Gastrointestinal:  Negative for abdominal pain and vomiting.   Genitourinary:  Negative for dysuria and hematuria.   Musculoskeletal:  Positive for arthralgias. Negative for back pain.        Stabbing pain on the right lateral ankle   Skin:  Negative for color change and rash.   Neurological:  Negative for seizures and syncope.   All other systems reviewed and are negative.      Objective   /88    "Pulse 96   Temp (!) 97.2 °F (36.2 °C)   Resp (!) 28   Ht 5' 8\" (1.727 m)   Wt (!) 141 kg (310 lb)   BMI 47.14 kg/m²      Physical Exam  Constitutional:       Appearance: Normal appearance. He is obese.   HENT:      Head: Normocephalic and atraumatic.      Right Ear: Tympanic membrane, ear canal and external ear normal.      Left Ear: Tympanic membrane, ear canal and external ear normal.      Nose: Nose normal.      Mouth/Throat:      Mouth: Mucous membranes are moist.      Pharynx: Oropharynx is clear.   Eyes:      Extraocular Movements: Extraocular movements intact.      Conjunctiva/sclera: Conjunctivae normal.      Pupils: Pupils are equal, round, and reactive to light.   Cardiovascular:      Rate and Rhythm: Normal rate and regular rhythm.      Pulses: Normal pulses.      Heart sounds: Normal heart sounds.   Pulmonary:      Effort: Pulmonary effort is normal.      Breath sounds: Normal breath sounds.   Abdominal:      General: Abdomen is flat. Bowel sounds are normal.      Palpations: Abdomen is soft.   Musculoskeletal:         General: Normal range of motion.      Cervical back: Normal range of motion.      Comments: Pain on palpation of the left lateral bolus of the right lower extremity   Skin:     General: Skin is warm and dry.      Capillary Refill: Capillary refill takes less than 2 seconds.   Neurological:      General: No focal deficit present.      Mental Status: He is alert and oriented to person, place, and time.   Psychiatric:         Mood and Affect: Mood normal.         Behavior: Behavior normal.         "

## 2025-01-18 ENCOUNTER — HOSPITAL ENCOUNTER (OUTPATIENT)
Dept: RADIOLOGY | Facility: HOSPITAL | Age: 35
Discharge: HOME | End: 2025-01-18
Payer: COMMERCIAL

## 2025-01-18 DIAGNOSIS — M25.571 ACUTE RIGHT ANKLE PAIN: ICD-10-CM

## 2025-01-18 PROCEDURE — 73610 X-RAY EXAM OF ANKLE: CPT

## 2025-01-20 ENCOUNTER — RESULTS FOLLOW-UP (OUTPATIENT)
Dept: FAMILY MEDICINE CLINIC | Facility: CLINIC | Age: 35
End: 2025-01-20

## 2025-01-20 NOTE — TELEPHONE ENCOUNTER
----- Message from Warren Head DO sent at 1/20/2025 11:43 AM EST -----  Please call the patient regarding his x-ray is unremarkable if he still having pain refer to orthopedics

## 2025-02-07 DIAGNOSIS — D68.9 COAGULOPATHY (HCC): ICD-10-CM

## 2025-02-07 DIAGNOSIS — Z86.718 HISTORY OF DVT (DEEP VEIN THROMBOSIS): Primary | ICD-10-CM

## 2025-02-07 NOTE — TELEPHONE ENCOUNTER
Patient stopped in office stating unable to get his Eliquis filled. Review of chart, patient is due for a refill. Called [facility] to clear up the confusion. [facility] reported being out of stock until next week. Patient provided with sample packs totaling 28 pills, and advised to check with pharmacy next week. Patient understood.

## 2025-05-01 ENCOUNTER — TELEPHONE (OUTPATIENT)
Dept: FAMILY MEDICINE CLINIC | Facility: CLINIC | Age: 35
End: 2025-05-01

## 2025-05-01 DIAGNOSIS — Z86.718 HISTORY OF DVT (DEEP VEIN THROMBOSIS): ICD-10-CM

## 2025-05-01 DIAGNOSIS — G40.919 BREAKTHROUGH SEIZURE (HCC): ICD-10-CM

## 2025-05-01 DIAGNOSIS — D68.9 COAGULOPATHY (HCC): ICD-10-CM

## 2025-05-01 RX ORDER — LEVETIRACETAM 500 MG/1
500 TABLET ORAL EVERY 12 HOURS
Qty: 180 TABLET | Refills: 0 | Status: SHIPPED | OUTPATIENT
Start: 2025-05-01

## 2025-05-01 NOTE — TELEPHONE ENCOUNTER
Reason for call:   [x] Refill   [] Prior Auth  [] Other:     Office:   [x] PCP/Provider - [facility]  Authorized By: Warren Head DO  [] Specialty/Provider -     apixaban (ELIQUIS) 2.5 mg-PLEASE REVIEW SIG    levETIRAcetam (KEPPRA) 500 mg tablet Take 1 tablet (500 mg total) by mouth every 12 (twelve) hours       Pharmacy: [id number] - [place] - [address] [place] [address]   Does the patient have enough for 3 days?   [] Yes   [x] No - Send as HP to POD    Mail Away Pharmacy   Does the patient have enough for 10 days?   [] Yes   [] No - Send as HP to POD

## 2025-05-01 NOTE — TELEPHONE ENCOUNTER
Patient trying to quit smoking. Requesting script for patches and gum be sent into pharmacy on file. Pharmacy preferences updated.

## 2025-05-12 NOTE — TELEPHONE ENCOUNTER
Called patient left message on voicemail relaying message below.  When patient calls back please schedule him an appointment.

## 2025-05-23 ENCOUNTER — TELEPHONE (OUTPATIENT)
Age: 35
End: 2025-05-23

## 2025-05-23 NOTE — TELEPHONE ENCOUNTER
Patient called in requesting a letter be made stating he is medicated for his seizures and able to work. Please advise.